# Patient Record
Sex: MALE | Race: WHITE | NOT HISPANIC OR LATINO | Employment: FULL TIME | URBAN - METROPOLITAN AREA
[De-identification: names, ages, dates, MRNs, and addresses within clinical notes are randomized per-mention and may not be internally consistent; named-entity substitution may affect disease eponyms.]

---

## 2018-01-26 ENCOUNTER — TELEPHONE (OUTPATIENT)
Dept: CARDIOLOGY | Facility: CLINIC | Age: 63
End: 2018-01-26

## 2018-01-26 NOTE — TELEPHONE ENCOUNTER
----- Message from Jillian Castillo MA sent at 1/26/2018  4:19 PM EST -----      ----- Message -----     From: Karo Alvarado     Sent: 1/26/2018   8:41 AM       To: ANA LUISA Wiseman Dr. is referring pt over to see MI. Dx: known CAD and had event of waking up with chest tightness,nausea,sweating, on 1/22/2018 and asked to be seen ASAP. Thanks

## 2018-01-26 NOTE — TELEPHONE ENCOUNTER
01/26/18  4:54 PM  I called to assess patient's symptoms. He states he has a history of CABG x 2 in 2001 with Dr. James. He followed with Dr. Duarte in [place] but has not followed with him for some time. He has a history of panic attacks. He had a panic attack this week which was associated with chest pain, sweating, and nausea. He reported this to his therapist who recommended follow-up with PCP; PCP recommended that he re-establish with a cardiologist.    He has had no chest pain not associated with anxiety. He has been able to lift weights and run on the treadmill without chest pain.    I offered him an appt with a different provider for a sooner appt, but he prefers to see Dr. Ayers. I left him a message to schedule him to see Dr. Ayers at  on 2/19 at 0920.    Simran GRIFFITH RN

## 2018-03-12 ENCOUNTER — OFFICE VISIT (OUTPATIENT)
Dept: CARDIOLOGY | Facility: CLINIC | Age: 63
End: 2018-03-12

## 2018-03-12 VITALS — WEIGHT: 235 LBS | BODY MASS INDEX: 32.9 KG/M2 | HEIGHT: 71 IN

## 2018-03-12 DIAGNOSIS — E78.2 MIXED HYPERLIPIDEMIA: ICD-10-CM

## 2018-03-12 DIAGNOSIS — I25.810 CORONARY ARTERY DISEASE INVOLVING CORONARY BYPASS GRAFT OF NATIVE HEART WITHOUT ANGINA PECTORIS: Primary | ICD-10-CM

## 2018-03-12 DIAGNOSIS — I25.2 OLD MI (MYOCARDIAL INFARCTION): ICD-10-CM

## 2018-03-12 DIAGNOSIS — I10 ESSENTIAL HYPERTENSION: ICD-10-CM

## 2018-03-12 PROCEDURE — 99204 OFFICE O/P NEW MOD 45 MIN: CPT | Performed by: INTERNAL MEDICINE

## 2018-03-12 PROCEDURE — 93000 ELECTROCARDIOGRAM COMPLETE: CPT | Performed by: INTERNAL MEDICINE

## 2018-03-12 RX ORDER — ESCITALOPRAM OXALATE 20 MG/1
20 TABLET ORAL DAILY
COMMUNITY
End: 2020-09-18

## 2018-03-12 RX ORDER — UBIDECARENONE 100 MG
100 CAPSULE ORAL DAILY
COMMUNITY

## 2018-03-12 RX ORDER — ALPRAZOLAM 0.5 MG/1
0.5 TABLET ORAL
COMMUNITY
End: 2018-09-10 | Stop reason: ALTCHOICE

## 2018-03-12 RX ORDER — AMOXICILLIN 500 MG
1200 CAPSULE ORAL DAILY
COMMUNITY

## 2018-03-12 RX ORDER — GARLIC 200 MG
1000 TABLET ORAL DAILY PRN
COMMUNITY
End: 2021-04-23

## 2018-03-12 NOTE — PROGRESS NOTES
Date of Office Visit: 18  Encounter Provider: Duncan Ayers MD  Place of Service: [facility] CARDIOLOGY  Patient Name: Toy Shannon  :1955      Chief Complaint   Patient presents with   • Coronary Artery Disease     History of Present Illness  Mr Shannon is a 61 yo gentleman with a history of hypertension, hyperlipidemia, coronary disease and previous corneal bypass grafting.  In 2011 he presented with symptoms appeared to probably had a myocardial infarction at an abnormal perfusion stress test with left ventricular ejection fraction of 35% and severe ischemia.  He then underwent cardiac catheterization that showed severe disease of the very proximal left anterior descending and severe disease the right coronary artery.  Circumflex was apparently free of disease.  He had a left ventricular ejection fraction of 30% on that.  He then underwent coronary bypass grafting where he had left internal mammary graft to the left anterior descending and vein graft to the right coronary artery.  Follow-up echocardiogram in May 2011 left ventricular ejection fraction apparently improved to 45%.  He now comes in for follow-up and to establish with new cardiologist.  He denies any complaints of chest pain or pressure.  Denies any shortness of breath, orthopnea, or PND.  Denies any palpitations, near-syncope or syncope.  Denies any prior history of rheumatic fever, heart attack, or heart failure, or heart murmur.  He has not been exercising recently started treadmill program where he would jog at 3.1 miles per hour for 12-16 minutes.          Past Medical History:   Diagnosis Date   • ADD (attention deficit disorder)    • ASCVD (arteriosclerotic cardiovascular disease)    • BMI 30.0-30.9,adult    • Chest pain    • Hypercholesteremia    • Hypogonadism in male    • Low testosterone    • Panic attack    • Snoring          History reviewed. No pertinent surgical  history.      Current Outpatient Prescriptions on File Prior to Visit   Medication Sig Dispense Refill   • albuterol (PROVENTIL HFA;VENTOLIN HFA) 108 (90 Base) MCG/ACT inhaler Inhale 2 puffs.     • atenolol (TENORMIN) 25 MG tablet Take 25 mg by mouth.     • atorvastatin (LIPITOR) 40 MG tablet Take 60 mg by mouth.     • sertraline (ZOLOFT) 100 MG tablet Take 100 mg by mouth.       No current facility-administered medications on file prior to visit.          Social History     Social History   • Marital status:      Spouse name: N/A   • Number of children: N/A   • Years of education: N/A     Occupational History   • Not on file.     Social History Main Topics   • Smoking status: Never Smoker   • Smokeless tobacco: Not on file   • Alcohol use Not on file   • Drug use: Unknown   • Sexual activity: Not on file     Other Topics Concern   • Not on file     Social History Narrative   • No narrative on file       History reviewed. No pertinent family history.      Review of Systems   Constitution: Negative for decreased appetite, diaphoresis, fever, weakness, malaise/fatigue, weight gain and weight loss.   HENT: Negative for congestion, hearing loss, nosebleeds and tinnitus.    Eyes: Negative for blurred vision, double vision, vision loss in left eye, vision loss in right eye and visual disturbance.   Cardiovascular:        As noted in HPI   Respiratory:        As noted HPI   Endocrine: Negative for cold intolerance and heat intolerance.   Hematologic/Lymphatic: Negative for bleeding problem. Does not bruise/bleed easily.   Skin: Negative for color change, flushing, itching and rash.   Musculoskeletal: Negative for arthritis, back pain, joint pain, joint swelling, muscle weakness and myalgias.   Gastrointestinal: Negative for bloating, abdominal pain, constipation, diarrhea, dysphagia, heartburn, hematemesis, hematochezia, melena, nausea and vomiting.   Genitourinary: Negative for bladder incontinence, dysuria,  "frequency, nocturia and urgency.   Neurological: Negative for dizziness, focal weakness, headaches, light-headedness, loss of balance, numbness, paresthesias and vertigo.   Psychiatric/Behavioral: Negative for depression, memory loss and substance abuse. The patient is nervous/anxious.        Procedures      ECG 12 Lead  Date/Time: 3/12/2018 12:57 PM  Performed by: SEAMUS JAMESON  Authorized by: SEAMUS JAMESON   Comparison: compared with previous ECG   Similar to previous ECG  Rhythm: sinus rhythm  Rate: normal  Conduction: right bundle branch block and LAFB  QRS axis: left                  Objective:    Ht 180.3 cm (71\")   Wt 107 kg (235 lb)   BMI 32.78 kg/m²        Physical Exam  Physical Exam   Constitutional: He is oriented to person, place, and time. He appears well-developed and well-nourished. No distress.   HENT:   Head: Normocephalic.   Eyes: Conjunctivae are normal. Pupils are equal, round, and reactive to light. No scleral icterus.   Neck: Normal carotid pulses, no hepatojugular reflux and no JVD present. Carotid bruit is not present. No tracheal deviation, no edema and no erythema present. No thyromegaly present.   Cardiovascular: Normal rate, regular rhythm, S1 normal, S2 normal, normal heart sounds and intact distal pulses.   No extrasystoles are present. PMI is not displaced.  Exam reveals no gallop, no distant heart sounds and no friction rub.    No murmur heard.  Pulses:       Carotid pulses are 2+ on the right side, and 2+ on the left side.       Radial pulses are 2+ on the right side, and 2+ on the left side.        Femoral pulses are 2+ on the right side, and 2+ on the left side.       Dorsalis pedis pulses are 2+ on the right side, and 2+ on the left side.        Posterior tibial pulses are 2+ on the right side, and 2+ on the left side.   Pulmonary/Chest: Effort normal and breath sounds normal. No respiratory distress. He has no decreased breath sounds. He has no wheezes. He has no " rhonchi. He has no rales. He exhibits no tenderness.   Abdominal: Soft. Bowel sounds are normal. He exhibits no distension and no mass. There is no hepatosplenomegaly. There is no tenderness. There is no rebound and no guarding.   Musculoskeletal: He exhibits no edema, tenderness or deformity.   Neurological: He is alert and oriented to person, place, and time.   Skin: Skin is warm and dry. No rash noted. He is not diaphoretic. No cyanosis or erythema. No pallor. Nails show no clubbing.   Psychiatric: He has a normal mood and affect. His speech is normal and behavior is normal. Judgment and thought content normal.           Assessment:   1.  62-year-old gentleman with history of coronary artery disease.  Ischemic cardiomyopathy initial left ventricular ejection fraction of 30% status post coronary bypass grafting in March 2011.  Follow-up echocardiogram May 2011 left ventricular ejection fraction of 45%.   Coronary Artery Disease  Assessment  • The patient has no angina    Plan  • Lifestyle modifications discussed include adhering to a heart healthy diet, avoidance of tobacco products, maintenance of a healthy weight, medication compliance, regular exercise and regular monitoring of cholesterol and blood pressure    Subjective - Objective  • There is a history of past MI  • There is a history of previous coronary artery bypass graft  • Current antiplatelet therapy includes aspirin 81 mg    He has not had a baseline perfusion stress test he's to return for that if unremarkable see me in 6 months and then yearly intervals after that.    2.  Hyperlipidemia on target dose atorvastatin continue the same.  3.  Hypertension blood pressure adequately controlled.  4.  Right bundle branch block and left intrafascicular block asymptomatic.    We did discuss the need for him to do moderate activity 40 minutes 4 days a week in addition to some strength training.         Plan:

## 2018-05-14 ENCOUNTER — TELEPHONE (OUTPATIENT)
Dept: CARDIOLOGY | Facility: CLINIC | Age: 63
End: 2018-05-14

## 2018-05-14 ENCOUNTER — HOSPITAL ENCOUNTER (OUTPATIENT)
Dept: CARDIOLOGY | Facility: HOSPITAL | Age: 63
Discharge: HOME | End: 2018-05-14
Attending: INTERNAL MEDICINE | Admitting: INTERNAL MEDICINE

## 2018-05-14 VITALS — BODY MASS INDEX: 32.9 KG/M2 | WEIGHT: 235 LBS | HEIGHT: 71 IN

## 2018-05-14 DIAGNOSIS — I25.810 CORONARY ARTERY DISEASE INVOLVING CORONARY BYPASS GRAFT OF NATIVE HEART WITHOUT ANGINA PECTORIS: ICD-10-CM

## 2018-05-14 DIAGNOSIS — I25.2 OLD MI (MYOCARDIAL INFARCTION): ICD-10-CM

## 2018-05-14 LAB
BH CV NUCLEAR PRIOR STUDY: 2
BH CV STRESS BP STAGE 1: NORMAL
BH CV STRESS BP STAGE 2: NORMAL
BH CV STRESS BP STAGE 3: NORMAL
BH CV STRESS DURATION MIN STAGE 1: 3
BH CV STRESS DURATION MIN STAGE 2: 3
BH CV STRESS DURATION MIN STAGE 3: 3
BH CV STRESS DURATION SEC STAGE 1: 0
BH CV STRESS DURATION SEC STAGE 2: 0
BH CV STRESS DURATION SEC STAGE 3: 0
BH CV STRESS GRADE STAGE 1: 10
BH CV STRESS GRADE STAGE 2: 12
BH CV STRESS GRADE STAGE 3: 14
BH CV STRESS HR STAGE 1: 89
BH CV STRESS HR STAGE 2: 109
BH CV STRESS HR STAGE 3: 133
BH CV STRESS METS STAGE 1: 5
BH CV STRESS METS STAGE 2: 7.5
BH CV STRESS METS STAGE 3: 10
BH CV STRESS PROTOCOL 1: NORMAL
BH CV STRESS RECOVERY BP: NORMAL MMHG
BH CV STRESS RECOVERY HR: 89 BPM
BH CV STRESS SPEED STAGE 1: 1.7
BH CV STRESS SPEED STAGE 2: 2.5
BH CV STRESS SPEED STAGE 3: 3.4
BH CV STRESS STAGE 1: 1
BH CV STRESS STAGE 2: 2
BH CV STRESS STAGE 3: 3
LV EF NUC BP: 59 %
MAXIMAL PREDICTED HEART RATE: 155 BPM
PERCENT MAX PREDICTED HR: 85.81 %
STRESS BASELINE BP: NORMAL MMHG
STRESS BASELINE HR: 65 BPM
STRESS PERCENT HR: 101 %
STRESS POST ESTIMATED WORKLOAD: 10 METS
STRESS POST EXERCISE DUR MIN: 9 MIN
STRESS POST EXERCISE DUR SEC: 0 SEC
STRESS POST PEAK BP: NORMAL MMHG
STRESS POST PEAK HR: 133 BPM
STRESS TARGET HR: 132 BPM

## 2018-05-14 PROCEDURE — 93017 CV STRESS TEST TRACING ONLY: CPT

## 2018-05-14 PROCEDURE — 93018 CV STRESS TEST I&R ONLY: CPT | Performed by: INTERNAL MEDICINE

## 2018-05-14 PROCEDURE — 78452 HT MUSCLE IMAGE SPECT MULT: CPT | Performed by: INTERNAL MEDICINE

## 2018-05-14 PROCEDURE — 0 TECHNETIUM TETROFOSMIN KIT: Performed by: INTERNAL MEDICINE

## 2018-05-14 PROCEDURE — 93016 CV STRESS TEST SUPVJ ONLY: CPT | Performed by: INTERNAL MEDICINE

## 2018-05-14 PROCEDURE — A9502 TC99M TETROFOSMIN: HCPCS | Performed by: INTERNAL MEDICINE

## 2018-05-14 PROCEDURE — 78452 HT MUSCLE IMAGE SPECT MULT: CPT

## 2018-05-14 RX ADMIN — TETROFOSMIN 1 DOSE: 1.38 INJECTION, POWDER, LYOPHILIZED, FOR SOLUTION INTRAVENOUS at 08:30

## 2018-05-14 RX ADMIN — TETROFOSMIN 1 DOSE: 1.38 INJECTION, POWDER, LYOPHILIZED, FOR SOLUTION INTRAVENOUS at 07:20

## 2018-05-14 NOTE — TELEPHONE ENCOUNTER
Toy Shannon  Male, 63 y.o., 1955  PCP:   Salima Winchester MD  Language:   English  Need Interp:   No  Last Weight:   107 kg (235 lb)  Phone:   H: [phone]  Allergies  No Known Allergies  Health Maintenance:   Due  FYI:   None  Primary Ins.:   MEDICARE  MRN:   [id number]  MyChart:   Pending  Pharmacy:   [facility] [place] [address] [place] [address] AT [facility] [address] [facility] [phone] [facility] [phone] FX [[id number]]  Preferred Lab:   None  Next Appt with Me:   09/10/2018  Next Appt Date by Dept:   06/01/2018     PACS Images     Radiology Images   Stress Test With Myocardial Perfusion One Day   Order: [id number]   Status:  Final result   Visible to patient:  No (Not Released) Dx:  Old MI (myocardial infarction); Coron...   Details     Reading Physician Reading Date Result Priority   MD Tejas Castillo MD Brennan M Haraden, MD 5/14/2018 5/14/2018 5/14/2018 Routine   Result Text   ·   Myocardial perfusion imaging indicates a normal myocardial perfusion study with no evidence of ischemia.  · Left ventricular ejection fraction is normal (Calculated EF = 59%).  · Impressions are consistent with a low risk study.  · There is no prior study available for comparison.            All Measurements                                                                                                                                                                                                                         [facility]  [address] [facility]  [address] [place] [id number] [phone]      Stress Data     Stage HR (bpm) BP (mmHg) Minutes Seconds Grade (%) Speed (MPH)   1        89        150/70        3        0        10        1.7          2        109        154/74        3        0        12        2.5          3        133        184/90        3        0        14        3.4          Stress Measurements     Baseline Vitals   Baseline HR 65  bpm      Baseline /78 mmHg       Peak Stress Vitals   Peak  bpm      Peak /90 mmHg       Recovery Vitals   Recovery HR 89 bpm      Recovery /80 mmHg       Exercise Data   Target HR (85%) 132 bpm      Max. Pred. HR (100%) 155 bpm      Percent Max Pred HR 85.81 %      Exercise duration (min) 9 min      Exercise duration (sec) 0 sec      Estimated workload 10 METS         Stress Procedure     Rest ECG Baseline ECG of normal sinus rhythm noted. Right bundle branch block and left anterior fascicular block noted.   OR interval = 160 ms. QRS complex = 120 ms. There was no ST segment deviation noted.      Stress ECG Stress ECG of normal sinus rhythm noted. Right bundle branch block and left anterior fascicular block noted.   There was no ST segment deviation noted during stress.   There were no arrhythmias during stress.   Arrhythmias during recovery: rare PVCs.   Arrhythmias were not significant.   ECG was interpretable and indicates a normal stress ECG.   Normal ECG stress ECG interpretation.      Stress Description A stress test was performed following the Gomez protocol.   The patient achieved the target heart rate. The patient requested the test to be stopped because the patient experienced fatigue.   The patient experienced no angina during the stress test.   Low risk for ischemic heart disease.   Blood pressure demonstrated a normal response to stress. Heart rate demonstrated a normal response to stress. Overall, the patient's exercise capacity was normal.      Stress Findings No ECG evidence of myocardial ischemia.Negative clinical evidence of myocardial ischemia.      Nuclear Perfusion Findings     Study Impression Myocardial perfusion imaging indicates a normal myocardial perfusion study with no evidence of ischemia. Impressions are consistent with a low risk study. There is no prior study available for comparison.      Rest Perfusion Defect 1 No rest myocardial perfusion defect noted.       Stress Perfusion Defect 1 No stress myocardial perfusion defect noted.      Ventricle Size / Description Left ventricular ejection fraction is normal (Calculated EF = 59%). Normal LV cavity size. Normal LV wall motion noted. Normal RV cavity size.      PACS Images     Show images for Stress Test With Myocardial Perfusion One Day          Last Resulted: 05/14/18 14:41                      Routing History     Priority Sent On From To Message Type    5/14/2018  2:43 PM MD Duncan Castillo MD Results

## 2018-06-01 ENCOUNTER — OFFICE VISIT (OUTPATIENT)
Dept: FAMILY MEDICINE CLINIC | Facility: CLINIC | Age: 63
End: 2018-06-01

## 2018-06-01 VITALS
OXYGEN SATURATION: 98 % | HEIGHT: 71 IN | SYSTOLIC BLOOD PRESSURE: 120 MMHG | RESPIRATION RATE: 18 BRPM | DIASTOLIC BLOOD PRESSURE: 60 MMHG | HEART RATE: 56 BPM | BODY MASS INDEX: 34.3 KG/M2 | WEIGHT: 245 LBS | TEMPERATURE: 98.1 F

## 2018-06-01 DIAGNOSIS — I10 HYPERTENSION, ESSENTIAL: Primary | ICD-10-CM

## 2018-06-01 DIAGNOSIS — Z12.11 SCREEN FOR COLON CANCER: ICD-10-CM

## 2018-06-01 DIAGNOSIS — E78.49 OTHER HYPERLIPIDEMIA: ICD-10-CM

## 2018-06-01 DIAGNOSIS — F41.9 ANXIETY DISORDER, UNSPECIFIED TYPE: ICD-10-CM

## 2018-06-01 PROCEDURE — 99203 OFFICE O/P NEW LOW 30 MIN: CPT | Performed by: INTERNAL MEDICINE

## 2018-06-01 RX ORDER — ALPRAZOLAM 1 MG/1
1 TABLET ORAL
COMMUNITY
Start: 2018-05-21 | End: 2022-11-16

## 2018-06-01 RX ORDER — ATORVASTATIN CALCIUM 80 MG/1
TABLET, FILM COATED ORAL
COMMUNITY
Start: 2018-05-15 | End: 2018-06-18 | Stop reason: SDUPTHER

## 2018-06-01 NOTE — PROGRESS NOTES
Subjective   Toy Shannon is a 63 y.o. male.   Patient formally with us now back again with history of high blood pressure lipids does have known coronary disease  History of Present Illness   Did get recent evaluation Dr. Ayers with good bill of health is on cholesterol medicine tolerating fairly well does bring several labs from February testing last is tolerating his Lipitor 80 mg daily well blood pressure satisfactory today also no exertional chest pain or other type complaints.  Does take Lexapro for anxiety disorder and when necessary Xanax no other major health changes since last seen he has not had a colonoscopy we did discuss this is we will get cologuard.    Review of Systems   All other systems reviewed and are negative.      Objective   Vitals:    06/01/18 0746   Resp: 18   Weight: 111 kg (245 lb)     Physical Exam   Constitutional: He appears well-developed and well-nourished.   HENT:   Head: Normocephalic and atraumatic.   Eyes: Conjunctivae are normal. Pupils are equal, round, and reactive to light.   Cardiovascular: Normal rate, regular rhythm and normal heart sounds.    Pulmonary/Chest: Effort normal and breath sounds normal.   Abdominal: Soft. Bowel sounds are normal.   Neurological: He is alert.   Unremarkable gait and station   Skin: Skin is warm and dry.   Psychiatric: He has a normal mood and affect. His behavior is normal.       No results found for: INR    Procedures    Assessment/Plan   .  1.  Hypertension controlled plan continue present medicine recheck 6 months    2.  Hyperlipidemia plan reviewed we will get a CMP to monitor liver enzymes lipids are under good control we will recheck that in 6 months    3.  Encounter for colon cancer screening patient is suppose to colonoscopy will proceed with [facility].  Patient is agreeable to    4.  Anxiety disorder and continue present medicines which include Lexapro and he takes when necessary Xanax for this.    Much of this encounter  note is an electronic transcription/translation of spoken language to printed text.  The electronic translation of spoken language may permit erroneous, or at times, nonsensical words or phrases to be inadvertently transcribed.  Although I have reviewed the note for such errors, some may still exist. If there are questions or for further clarification, please contact me.

## 2018-06-18 ENCOUNTER — TELEPHONE (OUTPATIENT)
Dept: FAMILY MEDICINE CLINIC | Facility: CLINIC | Age: 63
End: 2018-06-18

## 2018-06-18 RX ORDER — ATORVASTATIN CALCIUM 80 MG/1
80 TABLET, FILM COATED ORAL DAILY
Qty: 90 TABLET | Refills: 3 | Status: SHIPPED | OUTPATIENT
Start: 2018-06-18 | End: 2018-10-19 | Stop reason: SDUPTHER

## 2018-06-18 RX ORDER — ATENOLOL 25 MG/1
25 TABLET ORAL DAILY
Qty: 90 TABLET | Refills: 3 | Status: SHIPPED | OUTPATIENT
Start: 2018-06-18 | End: 2019-08-26 | Stop reason: SDUPTHER

## 2018-06-18 NOTE — TELEPHONE ENCOUNTER
REFILL ON atenolol (TENORMIN) 25 MG tablet  atorvastatin (LIPITOR) 40 MG tablet    PATIENT IS ALMOST OUT OF MEDICATION

## 2018-09-10 ENCOUNTER — OFFICE VISIT (OUTPATIENT)
Dept: CARDIOLOGY | Facility: CLINIC | Age: 63
End: 2018-09-10

## 2018-09-10 VITALS
HEIGHT: 71 IN | DIASTOLIC BLOOD PRESSURE: 84 MMHG | SYSTOLIC BLOOD PRESSURE: 130 MMHG | BODY MASS INDEX: 34.72 KG/M2 | HEART RATE: 63 BPM | WEIGHT: 248 LBS

## 2018-09-10 DIAGNOSIS — I25.2 OLD MI (MYOCARDIAL INFARCTION): ICD-10-CM

## 2018-09-10 DIAGNOSIS — IMO0001 OBESITY, CLASS II, BMI 35.0-39.9, WITH COMORBIDITY (SEE ACTUAL BMI): ICD-10-CM

## 2018-09-10 DIAGNOSIS — I25.810 CORONARY ARTERY DISEASE INVOLVING CORONARY BYPASS GRAFT OF NATIVE HEART WITHOUT ANGINA PECTORIS: Primary | ICD-10-CM

## 2018-09-10 DIAGNOSIS — I10 ESSENTIAL HYPERTENSION: ICD-10-CM

## 2018-09-10 DIAGNOSIS — E78.2 MIXED HYPERLIPIDEMIA: ICD-10-CM

## 2018-09-10 PROCEDURE — 93000 ELECTROCARDIOGRAM COMPLETE: CPT | Performed by: INTERNAL MEDICINE

## 2018-09-10 PROCEDURE — 99214 OFFICE O/P EST MOD 30 MIN: CPT | Performed by: INTERNAL MEDICINE

## 2018-09-10 RX ORDER — TRIAMCINOLONE ACETONIDE 55 UG/1
2 SPRAY, METERED NASAL AS NEEDED
COMMUNITY

## 2018-09-10 NOTE — PROGRESS NOTES
Date of Office Visit: 09/10/18  Encounter Provider: Duncan Ayers MD  Place of Service: [facility] CARDIOLOGY  Patient Name: Toy Shannon  :1955  Referral Provider:No ref. provider found      Chief Complaint   Patient presents with   • Coronary Artery Disease     History of Present Illness  Mr Shannon is a 64 yo gentleman with a history of hypertension, hyperlipidemia, coronary disease and previous corneal bypass grafting.  In 2011 he presented with symptoms appeared to probably had a myocardial infarction at an abnormal perfusion stress test with left ventricular ejection fraction of 35% and severe ischemia.  He then underwent cardiac catheterization that showed severe disease of the very proximal left anterior descending and severe disease the right coronary artery.  Circumflex was apparently free of disease.  He had a left ventricular ejection fraction of 30% on that.  He then underwent coronary bypass grafting where he had left internal mammary graft to the left anterior descending and vein graft to the right coronary artery.  Follow-up echocardiogram in May 2011 left ventricular ejection fraction apparently improved to 45%.  He now comes in for follow-up. The patient denies chest pain, pressure and heaviness. No shortness of breath, othopnea or PND. No palpitations, near syncope or syncope. No stroke type symptoms like paralysis, paresthesia, abrupt vision loss and dysarthria. No bleeding like blood in the stool or dark stools.  Unfortunately he still not really doing any structured exercise.  Overall he feels like he's doing well.  Things at home are good.      Coronary Artery Disease   Pertinent negatives include no dizziness, muscle weakness or weight gain.         Past Medical History:   Diagnosis Date   • ADD (attention deficit disorder)    • ADHD    • Anxiety    • ASCVD (arteriosclerotic cardiovascular disease)    • BMI 30.0-30.9,adult    • Chest pain    •  Hypercholesteremia    • Hypertension    • Hypogonadism in male    • Low testosterone    • Panic attack    • Snoring          Past Surgical History:   Procedure Laterality Date   • CARDIAC CATHETERIZATION     • CORONARY ARTERY BYPASS GRAFT           Current Outpatient Prescriptions on File Prior to Visit   Medication Sig Dispense Refill   • ALPRAZolam (XANAX) 1 MG tablet Take 1 mg by mouth. Takes .5 prn     • Ascorbic Acid (VITAMIN C) powder Take 3,000 mg by mouth Daily.     • aspirin 81 MG tablet Take 1 tablet by mouth Daily. 30 tablet 11   • atenolol (TENORMIN) 25 MG tablet Take 1 tablet by mouth Daily. 90 tablet 3   • atorvastatin (LIPITOR) 80 MG tablet Take 1 tablet by mouth Daily. 90 tablet 3   • coenzyme Q10 100 MG capsule Take 100 mg by mouth Daily.     • escitalopram (LEXAPRO) 20 MG tablet Take 20 mg by mouth Daily.     • Multiple Vitamins-Minerals (MULTIVITAMIN ADULTS PO) Take  by mouth Daily.     • Omega-3 Fatty Acids (FISH OIL) 1200 MG capsule capsule Take 1,200 mg by mouth Daily.     • [DISCONTINUED] albuterol (PROVENTIL HFA;VENTOLIN HFA) 108 (90 Base) MCG/ACT inhaler Inhale 2 puffs Every 6 (Six) Hours As Needed.     • [DISCONTINUED] ALPRAZolam (XANAX) 0.5 MG tablet Take 0.5 mg by mouth. 5 tablets prn       No current facility-administered medications on file prior to visit.          Social History     Social History   • Marital status:      Spouse name: N/A   • Number of children: N/A   • Years of education: N/A     Occupational History   • Not on file.     Social History Main Topics   • Smoking status: Never Smoker   • Smokeless tobacco: Never Used   • Alcohol use No   • Drug use: Unknown   • Sexual activity: Not on file     Other Topics Concern   • Not on file     Social History Narrative   • No narrative on file       Family History   Problem Relation Age of Onset   • Hypertension Mother    • Heart disease Father    • Diabetes Sister          Review of Systems   Constitution: Negative for  "decreased appetite, diaphoresis, fever, weakness, malaise/fatigue, weight gain and weight loss.   HENT: Negative for congestion, hearing loss, nosebleeds and tinnitus.    Eyes: Negative for blurred vision, double vision, vision loss in left eye, vision loss in right eye and visual disturbance.   Cardiovascular:        As noted in HPI   Respiratory:        As noted HPI   Endocrine: Negative for cold intolerance and heat intolerance.   Hematologic/Lymphatic: Negative for bleeding problem. Does not bruise/bleed easily.   Skin: Negative for color change, flushing, itching and rash.   Musculoskeletal: Negative for arthritis, back pain, joint pain, joint swelling, muscle weakness and myalgias.   Gastrointestinal: Negative for bloating, abdominal pain, constipation, diarrhea, dysphagia, heartburn, hematemesis, hematochezia, melena, nausea and vomiting.   Genitourinary: Negative for bladder incontinence, dysuria, frequency, nocturia and urgency.   Neurological: Negative for dizziness, focal weakness, headaches, light-headedness, loss of balance, numbness, paresthesias and vertigo.   Psychiatric/Behavioral: Negative for depression, memory loss and substance abuse.       Procedures      ECG 12 Lead  Date/Time: 9/10/2018 10:40 AM  Performed by: SEAMUS JAMESON  Authorized by: SEAMUS JAMESON   Comparison: compared with previous ECG   Similar to previous ECG  Rhythm: sinus rhythm  Rate: normal  Conduction: right bundle branch block and LAFB  QRS axis: left                  Objective:    /84 (BP Location: Left arm)   Pulse 63   Ht 180.3 cm (71\")   Wt 112 kg (248 lb)   BMI 34.59 kg/m²        Physical Exam  Physical Exam   Constitutional: He is oriented to person, place, and time. He appears well-developed and well-nourished. No distress.   HENT:   Head: Normocephalic.   Eyes: Pupils are equal, round, and reactive to light. Conjunctivae are normal. No scleral icterus.   Neck: Normal carotid pulses, no hepatojugular " reflux and no JVD present. Carotid bruit is not present. No tracheal deviation, no edema and no erythema present. No thyromegaly present.   Cardiovascular: Normal rate, regular rhythm, S1 normal, S2 normal, normal heart sounds and intact distal pulses.   No extrasystoles are present. PMI is not displaced.  Exam reveals no gallop, no distant heart sounds and no friction rub.    No murmur heard.  Pulses:       Carotid pulses are 2+ on the right side, and 2+ on the left side.       Radial pulses are 2+ on the right side, and 2+ on the left side.        Femoral pulses are 2+ on the right side, and 2+ on the left side.       Dorsalis pedis pulses are 2+ on the right side, and 2+ on the left side.        Posterior tibial pulses are 2+ on the right side, and 2+ on the left side.   Pulmonary/Chest: Effort normal and breath sounds normal. No respiratory distress. He has no decreased breath sounds. He has no wheezes. He has no rhonchi. He has no rales. He exhibits no tenderness.   Abdominal: Soft. Bowel sounds are normal. He exhibits no distension and no mass. There is no hepatosplenomegaly. There is no tenderness. There is no rebound and no guarding.   Musculoskeletal: He exhibits no edema, tenderness or deformity.   Neurological: He is alert and oriented to person, place, and time.   Skin: Skin is warm and dry. No rash noted. He is not diaphoretic. No cyanosis or erythema. No pallor. Nails show no clubbing.   Psychiatric: He has a normal mood and affect. His speech is normal and behavior is normal. Judgment and thought content normal.           Assessment:   1.  63-year-old gentleman with history of coronary artery disease.  Ischemic cardiomyopathy initial left ventricular ejection fraction of 30% status post coronary bypass grafting in March 2011.  Follow-up echocardiogram May 2011 left ventricular ejection fraction of 45%. Normal perfusion stress test in May 2018.  Coronary Artery Disease  Assessment  • The patient has no  angina     Plan  • Lifestyle modifications discussed include adhering to a heart healthy diet, avoidance of tobacco products, maintenance of a healthy weight, medication compliance, regular exercise and regular monitoring of cholesterol and blood pressure     Subjective - Objective  • There is a history of past MI  • There is a history of previous coronary artery bypass graft  • Current antiplatelet therapy includes aspirin 81 mg   He is to continue the same and see us in follow up in one year.     2.  Hyperlipidemia on target dose atorvastatin continue the same.  3.  Hypertension blood pressure adequately controlled.  4.  Right bundle branch block and left intrafascicular block asymptomatic.   5. Obesity. BMI is 35. He is to start diet and exercise.         Plan:

## 2018-09-25 ENCOUNTER — TELEPHONE (OUTPATIENT)
Dept: FAMILY MEDICINE CLINIC | Facility: CLINIC | Age: 63
End: 2018-09-25

## 2018-09-25 DIAGNOSIS — E78.5 HYPERLIPIDEMIA, UNSPECIFIED HYPERLIPIDEMIA TYPE: Primary | ICD-10-CM

## 2018-10-05 ENCOUNTER — LAB (OUTPATIENT)
Dept: FAMILY MEDICINE CLINIC | Facility: CLINIC | Age: 63
End: 2018-10-05

## 2018-10-05 DIAGNOSIS — E78.5 HYPERLIPIDEMIA, UNSPECIFIED HYPERLIPIDEMIA TYPE: ICD-10-CM

## 2018-10-05 LAB
ALBUMIN SERPL-MCNC: 4 G/DL (ref 3.5–5.2)
ALBUMIN/GLOB SERPL: 1.3 G/DL
ALP SERPL-CCNC: 105 U/L (ref 39–117)
ALT SERPL W P-5'-P-CCNC: 59 U/L (ref 1–41)
ANION GAP SERPL CALCULATED.3IONS-SCNC: 11.3 MMOL/L
AST SERPL-CCNC: 36 U/L (ref 1–40)
BILIRUB SERPL-MCNC: 0.4 MG/DL (ref 0.1–1.2)
BUN BLD-MCNC: 10 MG/DL (ref 8–23)
BUN/CREAT SERPL: 9.9 (ref 7–25)
CALCIUM SPEC-SCNC: 9.6 MG/DL (ref 8.6–10.5)
CHLORIDE SERPL-SCNC: 98 MMOL/L (ref 98–107)
CHOLEST SERPL-MCNC: 107 MG/DL (ref 0–200)
CO2 SERPL-SCNC: 27.7 MMOL/L (ref 22–29)
CREAT BLD-MCNC: 1.01 MG/DL (ref 0.76–1.27)
GFR SERPL CREATININE-BSD FRML MDRD: 74 ML/MIN/1.73
GLOBULIN UR ELPH-MCNC: 3.1 GM/DL
GLUCOSE BLD-MCNC: 99 MG/DL (ref 65–99)
HDLC SERPL-MCNC: 42 MG/DL (ref 40–60)
LDLC SERPL CALC-MCNC: 55 MG/DL (ref 0–100)
LDLC/HDLC SERPL: 1.32 {RATIO}
POTASSIUM BLD-SCNC: 4.4 MMOL/L (ref 3.5–5.2)
PROT SERPL-MCNC: 7.1 G/DL (ref 6–8.5)
SODIUM BLD-SCNC: 137 MMOL/L (ref 136–145)
TRIGL SERPL-MCNC: 48 MG/DL (ref 0–150)
VLDLC SERPL-MCNC: 9.6 MG/DL (ref 5–40)

## 2018-10-05 PROCEDURE — 36415 COLL VENOUS BLD VENIPUNCTURE: CPT | Performed by: INTERNAL MEDICINE

## 2018-10-05 PROCEDURE — 80061 LIPID PANEL: CPT | Performed by: INTERNAL MEDICINE

## 2018-10-05 PROCEDURE — 80053 COMPREHEN METABOLIC PANEL: CPT | Performed by: INTERNAL MEDICINE

## 2018-10-16 ENCOUNTER — TELEPHONE (OUTPATIENT)
Dept: FAMILY MEDICINE CLINIC | Facility: CLINIC | Age: 63
End: 2018-10-16

## 2018-10-16 DIAGNOSIS — R79.89 ELEVATED LFTS: Primary | ICD-10-CM

## 2018-10-16 NOTE — TELEPHONE ENCOUNTER
PT CALLED TO GET HIS LAB RESULTS. HE STATES THAT HE WANTS THE NUMBERS FROM THE LIPID PANEL MORE THAN ANYTHING, AND THAT WHO EVER CALLS CAN LEAVE A DETAILED MESSAGE WITH THE NUMBERS ON HIS VOICE MAIL

## 2018-10-16 NOTE — TELEPHONE ENCOUNTER
LDL is 55 is  below 70 w which is ideal, triglycerides are 48  which is excellent.  Did have one elevated LFT see if his prior history of that and we will get a hepatitis profile will see added overall numbers look good tentatively repeat these in 3 months time.

## 2018-10-19 ENCOUNTER — OFFICE VISIT (OUTPATIENT)
Dept: FAMILY MEDICINE CLINIC | Facility: CLINIC | Age: 63
End: 2018-10-19

## 2018-10-19 VITALS
DIASTOLIC BLOOD PRESSURE: 90 MMHG | SYSTOLIC BLOOD PRESSURE: 138 MMHG | HEIGHT: 71 IN | WEIGHT: 246.8 LBS | BODY MASS INDEX: 34.55 KG/M2 | OXYGEN SATURATION: 98 % | HEART RATE: 86 BPM | TEMPERATURE: 97.8 F

## 2018-10-19 DIAGNOSIS — R79.89 LFT ELEVATION: ICD-10-CM

## 2018-10-19 DIAGNOSIS — R79.89 ELEVATED LFTS: ICD-10-CM

## 2018-10-19 DIAGNOSIS — I10 HYPERTENSION, ESSENTIAL: ICD-10-CM

## 2018-10-19 DIAGNOSIS — E78.49 OTHER HYPERLIPIDEMIA: Primary | ICD-10-CM

## 2018-10-19 DIAGNOSIS — R06.83 SNORING: ICD-10-CM

## 2018-10-19 LAB
HAV IGM SERPL QL IA: NORMAL
HBV CORE IGM SERPL QL IA: NORMAL
HBV SURFACE AG SERPL QL IA: NORMAL
HCV AB SER DONR QL: NORMAL

## 2018-10-19 PROCEDURE — 80074 ACUTE HEPATITIS PANEL: CPT | Performed by: INTERNAL MEDICINE

## 2018-10-19 PROCEDURE — 99214 OFFICE O/P EST MOD 30 MIN: CPT | Performed by: INTERNAL MEDICINE

## 2018-10-19 PROCEDURE — 36415 COLL VENOUS BLD VENIPUNCTURE: CPT | Performed by: INTERNAL MEDICINE

## 2018-10-19 RX ORDER — ATORVASTATIN CALCIUM 40 MG/1
40 TABLET, FILM COATED ORAL DAILY
Qty: 90 TABLET | Refills: 1 | Status: SHIPPED | OUTPATIENT
Start: 2018-10-19 | End: 2018-12-06 | Stop reason: SDUPTHER

## 2018-12-05 ENCOUNTER — LAB (OUTPATIENT)
Dept: FAMILY MEDICINE CLINIC | Facility: CLINIC | Age: 63
End: 2018-12-05

## 2018-12-05 DIAGNOSIS — E78.49 OTHER HYPERLIPIDEMIA: ICD-10-CM

## 2018-12-05 LAB
ALBUMIN SERPL-MCNC: 4.2 G/DL (ref 3.5–5.2)
ALBUMIN/GLOB SERPL: 1.3 G/DL
ALP SERPL-CCNC: 84 U/L (ref 39–117)
ALT SERPL W P-5'-P-CCNC: 30 U/L (ref 1–41)
ANION GAP SERPL CALCULATED.3IONS-SCNC: 9.4 MMOL/L
AST SERPL-CCNC: 26 U/L (ref 1–40)
BILIRUB SERPL-MCNC: 0.4 MG/DL (ref 0.1–1.2)
BUN BLD-MCNC: 12 MG/DL (ref 8–23)
BUN/CREAT SERPL: 12.1 (ref 7–25)
CALCIUM SPEC-SCNC: 9.7 MG/DL (ref 8.6–10.5)
CHLORIDE SERPL-SCNC: 99 MMOL/L (ref 98–107)
CHOLEST SERPL-MCNC: 167 MG/DL (ref 0–200)
CO2 SERPL-SCNC: 29.6 MMOL/L (ref 22–29)
CREAT BLD-MCNC: 0.99 MG/DL (ref 0.76–1.27)
GFR SERPL CREATININE-BSD FRML MDRD: 76 ML/MIN/1.73
GLOBULIN UR ELPH-MCNC: 3.2 GM/DL
GLUCOSE BLD-MCNC: 108 MG/DL (ref 65–99)
HDLC SERPL-MCNC: 51 MG/DL (ref 40–60)
LDLC SERPL CALC-MCNC: 94 MG/DL (ref 0–100)
LDLC/HDLC SERPL: 1.85 {RATIO}
POTASSIUM BLD-SCNC: 4.6 MMOL/L (ref 3.5–5.2)
PROT SERPL-MCNC: 7.4 G/DL (ref 6–8.5)
SODIUM BLD-SCNC: 138 MMOL/L (ref 136–145)
TRIGL SERPL-MCNC: 109 MG/DL (ref 0–150)
VLDLC SERPL-MCNC: 21.8 MG/DL (ref 5–40)

## 2018-12-05 PROCEDURE — 80061 LIPID PANEL: CPT | Performed by: INTERNAL MEDICINE

## 2018-12-05 PROCEDURE — 80053 COMPREHEN METABOLIC PANEL: CPT | Performed by: INTERNAL MEDICINE

## 2018-12-05 PROCEDURE — 36415 COLL VENOUS BLD VENIPUNCTURE: CPT | Performed by: INTERNAL MEDICINE

## 2018-12-06 DIAGNOSIS — E78.5 HYPERLIPIDEMIA, UNSPECIFIED HYPERLIPIDEMIA TYPE: Primary | ICD-10-CM

## 2018-12-06 RX ORDER — ATORVASTATIN CALCIUM 80 MG/1
80 TABLET, FILM COATED ORAL DAILY
Qty: 90 TABLET | Refills: 0 | Status: SHIPPED | OUTPATIENT
Start: 2018-12-06 | End: 2019-08-26 | Stop reason: SDUPTHER

## 2019-03-04 ENCOUNTER — LAB (OUTPATIENT)
Dept: FAMILY MEDICINE CLINIC | Facility: CLINIC | Age: 64
End: 2019-03-04

## 2019-03-04 DIAGNOSIS — E78.5 HYPERLIPIDEMIA, UNSPECIFIED HYPERLIPIDEMIA TYPE: ICD-10-CM

## 2019-03-04 LAB
ALBUMIN SERPL-MCNC: 4.3 G/DL (ref 3.5–5.2)
ALBUMIN/GLOB SERPL: 1.4 G/DL
ALP SERPL-CCNC: 75 U/L (ref 39–117)
ALT SERPL W P-5'-P-CCNC: 31 U/L (ref 1–41)
ANION GAP SERPL CALCULATED.3IONS-SCNC: 11.8 MMOL/L
AST SERPL-CCNC: 26 U/L (ref 1–40)
BILIRUB SERPL-MCNC: 0.5 MG/DL (ref 0.1–1.2)
BUN BLD-MCNC: 13 MG/DL (ref 8–23)
BUN/CREAT SERPL: 13.3 (ref 7–25)
CALCIUM SPEC-SCNC: 9.6 MG/DL (ref 8.6–10.5)
CHLORIDE SERPL-SCNC: 101 MMOL/L (ref 98–107)
CHOLEST SERPL-MCNC: 156 MG/DL (ref 0–200)
CO2 SERPL-SCNC: 26.2 MMOL/L (ref 22–29)
CREAT BLD-MCNC: 0.98 MG/DL (ref 0.76–1.27)
GFR SERPL CREATININE-BSD FRML MDRD: 77 ML/MIN/1.73
GLOBULIN UR ELPH-MCNC: 3.1 GM/DL
GLUCOSE BLD-MCNC: 102 MG/DL (ref 65–99)
HDLC SERPL-MCNC: 48 MG/DL (ref 40–60)
LDLC SERPL CALC-MCNC: 89 MG/DL (ref 0–100)
LDLC/HDLC SERPL: 1.85 {RATIO}
POTASSIUM BLD-SCNC: 4.6 MMOL/L (ref 3.5–5.2)
PROT SERPL-MCNC: 7.4 G/DL (ref 6–8.5)
SODIUM BLD-SCNC: 139 MMOL/L (ref 136–145)
TRIGL SERPL-MCNC: 96 MG/DL (ref 0–150)
VLDLC SERPL-MCNC: 19.2 MG/DL (ref 5–40)

## 2019-03-04 PROCEDURE — 36415 COLL VENOUS BLD VENIPUNCTURE: CPT | Performed by: INTERNAL MEDICINE

## 2019-03-04 PROCEDURE — 80061 LIPID PANEL: CPT | Performed by: INTERNAL MEDICINE

## 2019-03-04 PROCEDURE — 80053 COMPREHEN METABOLIC PANEL: CPT | Performed by: INTERNAL MEDICINE

## 2019-03-06 DIAGNOSIS — E78.5 HYPERLIPIDEMIA, UNSPECIFIED HYPERLIPIDEMIA TYPE: Primary | ICD-10-CM

## 2019-03-06 RX ORDER — EZETIMIBE 10 MG/1
10 TABLET ORAL DAILY
Qty: 30 TABLET | Refills: 1 | Status: SHIPPED | OUTPATIENT
Start: 2019-03-06 | End: 2019-06-05 | Stop reason: SDUPTHER

## 2019-04-29 ENCOUNTER — LAB (OUTPATIENT)
Dept: FAMILY MEDICINE CLINIC | Facility: CLINIC | Age: 64
End: 2019-04-29

## 2019-04-29 DIAGNOSIS — E78.5 HYPERLIPIDEMIA, UNSPECIFIED HYPERLIPIDEMIA TYPE: ICD-10-CM

## 2019-04-29 LAB
ALBUMIN SERPL-MCNC: 4 G/DL (ref 3.5–5.2)
ALBUMIN/GLOB SERPL: 1.3 G/DL
ALP SERPL-CCNC: 84 U/L (ref 39–117)
ALT SERPL W P-5'-P-CCNC: 30 U/L (ref 1–41)
ANION GAP SERPL CALCULATED.3IONS-SCNC: 10.6 MMOL/L
AST SERPL-CCNC: 29 U/L (ref 1–40)
BILIRUB SERPL-MCNC: 0.3 MG/DL (ref 0.2–1.2)
BUN BLD-MCNC: 11 MG/DL (ref 8–23)
BUN/CREAT SERPL: 12.2 (ref 7–25)
CALCIUM SPEC-SCNC: 9.5 MG/DL (ref 8.6–10.5)
CHLORIDE SERPL-SCNC: 102 MMOL/L (ref 98–107)
CHOLEST SERPL-MCNC: 141 MG/DL (ref 0–200)
CO2 SERPL-SCNC: 26.4 MMOL/L (ref 22–29)
CREAT BLD-MCNC: 0.9 MG/DL (ref 0.76–1.27)
GFR SERPL CREATININE-BSD FRML MDRD: 84 ML/MIN/1.73
GLOBULIN UR ELPH-MCNC: 3.2 GM/DL
GLUCOSE BLD-MCNC: 103 MG/DL (ref 65–99)
HDLC SERPL-MCNC: 52 MG/DL (ref 40–60)
LDLC SERPL CALC-MCNC: 76 MG/DL (ref 0–100)
LDLC/HDLC SERPL: 1.46 {RATIO}
POTASSIUM BLD-SCNC: 5 MMOL/L (ref 3.5–5.2)
PROT SERPL-MCNC: 7.2 G/DL (ref 6–8.5)
SODIUM BLD-SCNC: 139 MMOL/L (ref 136–145)
TRIGL SERPL-MCNC: 65 MG/DL (ref 0–150)
VLDLC SERPL-MCNC: 13 MG/DL (ref 5–40)

## 2019-04-29 PROCEDURE — 80053 COMPREHEN METABOLIC PANEL: CPT | Performed by: INTERNAL MEDICINE

## 2019-04-29 PROCEDURE — 36415 COLL VENOUS BLD VENIPUNCTURE: CPT | Performed by: INTERNAL MEDICINE

## 2019-04-29 PROCEDURE — 80061 LIPID PANEL: CPT | Performed by: INTERNAL MEDICINE

## 2019-06-05 RX ORDER — EZETIMIBE 10 MG/1
TABLET ORAL
Qty: 30 TABLET | Refills: 0 | Status: SHIPPED | OUTPATIENT
Start: 2019-06-05 | End: 2019-09-25

## 2019-06-25 ENCOUNTER — OFFICE VISIT (OUTPATIENT)
Dept: FAMILY MEDICINE CLINIC | Facility: CLINIC | Age: 64
End: 2019-06-25

## 2019-06-25 VITALS
HEIGHT: 71 IN | TEMPERATURE: 98 F | SYSTOLIC BLOOD PRESSURE: 128 MMHG | DIASTOLIC BLOOD PRESSURE: 82 MMHG | OXYGEN SATURATION: 96 % | WEIGHT: 257.6 LBS | BODY MASS INDEX: 36.06 KG/M2 | HEART RATE: 62 BPM

## 2019-06-25 DIAGNOSIS — M25.512 ACUTE PAIN OF LEFT SHOULDER: Primary | ICD-10-CM

## 2019-06-25 PROCEDURE — 73030 X-RAY EXAM OF SHOULDER: CPT | Performed by: INTERNAL MEDICINE

## 2019-06-25 PROCEDURE — 99213 OFFICE O/P EST LOW 20 MIN: CPT | Performed by: INTERNAL MEDICINE

## 2019-08-26 ENCOUNTER — TELEPHONE (OUTPATIENT)
Dept: FAMILY MEDICINE CLINIC | Facility: CLINIC | Age: 64
End: 2019-08-26

## 2019-08-26 RX ORDER — ATORVASTATIN CALCIUM 80 MG/1
80 TABLET, FILM COATED ORAL DAILY
Qty: 90 TABLET | Refills: 0 | Status: SHIPPED | OUTPATIENT
Start: 2019-08-26 | End: 2019-09-25 | Stop reason: SDUPTHER

## 2019-08-26 RX ORDER — ATENOLOL 25 MG/1
TABLET ORAL
Qty: 90 TABLET | Refills: 2 | Status: SHIPPED | OUTPATIENT
Start: 2019-08-26 | End: 2019-09-25 | Stop reason: SDUPTHER

## 2019-09-25 ENCOUNTER — OFFICE VISIT (OUTPATIENT)
Dept: FAMILY MEDICINE CLINIC | Facility: CLINIC | Age: 64
End: 2019-09-25

## 2019-09-25 VITALS
OXYGEN SATURATION: 97 % | BODY MASS INDEX: 36.26 KG/M2 | RESPIRATION RATE: 18 BRPM | TEMPERATURE: 98 F | HEIGHT: 71 IN | DIASTOLIC BLOOD PRESSURE: 69 MMHG | WEIGHT: 259 LBS | SYSTOLIC BLOOD PRESSURE: 114 MMHG | HEART RATE: 66 BPM

## 2019-09-25 DIAGNOSIS — E78.5 HYPERLIPIDEMIA, UNSPECIFIED HYPERLIPIDEMIA TYPE: ICD-10-CM

## 2019-09-25 DIAGNOSIS — I10 ESSENTIAL HYPERTENSION: Primary | ICD-10-CM

## 2019-09-25 DIAGNOSIS — Z12.5 SCREENING FOR PROSTATE CANCER: ICD-10-CM

## 2019-09-25 DIAGNOSIS — N52.9 ERECTILE DYSFUNCTION, UNSPECIFIED ERECTILE DYSFUNCTION TYPE: ICD-10-CM

## 2019-09-25 PROBLEM — F90.9 ADHD: Status: ACTIVE | Noted: 2017-03-31

## 2019-09-25 PROBLEM — F41.1 GENERALIZED ANXIETY DISORDER: Status: ACTIVE | Noted: 2017-03-31

## 2019-09-25 PROCEDURE — 99203 OFFICE O/P NEW LOW 30 MIN: CPT | Performed by: FAMILY MEDICINE

## 2019-09-25 RX ORDER — TRAZODONE HYDROCHLORIDE 100 MG/1
100 TABLET ORAL
COMMUNITY
Start: 2019-09-21

## 2019-09-25 RX ORDER — ATENOLOL 25 MG/1
25 TABLET ORAL DAILY
Qty: 90 TABLET | Refills: 1 | Status: SHIPPED | OUTPATIENT
Start: 2019-09-25 | End: 2020-08-25

## 2019-09-25 RX ORDER — FEXOFENADINE HCL 180 MG/1
180 TABLET ORAL DAILY
COMMUNITY
End: 2021-04-23

## 2019-09-25 RX ORDER — ATORVASTATIN CALCIUM 80 MG/1
80 TABLET, FILM COATED ORAL DAILY
Qty: 90 TABLET | Refills: 1 | Status: SHIPPED | OUTPATIENT
Start: 2019-09-25 | End: 2020-03-12

## 2019-09-25 RX ORDER — BUPROPION HYDROCHLORIDE 150 MG/1
150 TABLET ORAL DAILY
COMMUNITY
Start: 2019-09-21 | End: 2020-09-18

## 2019-09-25 RX ORDER — CHROMIUM 200 MCG
200 TABLET ORAL DAILY
COMMUNITY
End: 2020-09-18

## 2019-10-02 ENCOUNTER — CLINICAL SUPPORT (OUTPATIENT)
Dept: FAMILY MEDICINE CLINIC | Facility: CLINIC | Age: 64
End: 2019-10-02

## 2019-10-02 DIAGNOSIS — Z12.5 SCREENING FOR PROSTATE CANCER: ICD-10-CM

## 2019-10-02 DIAGNOSIS — N52.9 ERECTILE DYSFUNCTION, UNSPECIFIED ERECTILE DYSFUNCTION TYPE: ICD-10-CM

## 2019-10-02 LAB — PSA SERPL-MCNC: 1.07 NG/ML (ref 0–4)

## 2019-10-02 PROCEDURE — 84403 ASSAY OF TOTAL TESTOSTERONE: CPT | Performed by: FAMILY MEDICINE

## 2019-10-02 PROCEDURE — G0103 PSA SCREENING: HCPCS | Performed by: FAMILY MEDICINE

## 2019-10-02 PROCEDURE — 36415 COLL VENOUS BLD VENIPUNCTURE: CPT | Performed by: FAMILY MEDICINE

## 2019-10-02 PROCEDURE — 84402 ASSAY OF FREE TESTOSTERONE: CPT | Performed by: FAMILY MEDICINE

## 2019-10-06 LAB
TESTOST FREE SERPL-MCNC: 5.7 PG/ML (ref 6.6–18.1)
TESTOST SERPL-MCNC: 474.5 NG/DL (ref 264–916)

## 2019-10-08 DIAGNOSIS — E29.1 HYPOGONADISM IN MALE: Primary | ICD-10-CM

## 2019-10-08 RX ORDER — TESTOSTERONE 20.25 MG/1.25G
GEL TOPICAL
Qty: 75 G | Refills: 2 | Status: SHIPPED | OUTPATIENT
Start: 2019-10-08 | End: 2020-01-22 | Stop reason: SDUPTHER

## 2019-10-28 ENCOUNTER — OFFICE VISIT (OUTPATIENT)
Dept: CARDIOLOGY | Facility: CLINIC | Age: 64
End: 2019-10-28

## 2019-10-28 VITALS
SYSTOLIC BLOOD PRESSURE: 140 MMHG | WEIGHT: 259.6 LBS | HEART RATE: 79 BPM | BODY MASS INDEX: 36.34 KG/M2 | HEIGHT: 71 IN | DIASTOLIC BLOOD PRESSURE: 80 MMHG

## 2019-10-28 DIAGNOSIS — E78.2 MIXED HYPERLIPIDEMIA: ICD-10-CM

## 2019-10-28 DIAGNOSIS — I25.810 CORONARY ARTERY DISEASE INVOLVING CORONARY BYPASS GRAFT OF NATIVE HEART WITHOUT ANGINA PECTORIS: Primary | ICD-10-CM

## 2019-10-28 DIAGNOSIS — I10 ESSENTIAL HYPERTENSION: ICD-10-CM

## 2019-10-28 DIAGNOSIS — I25.2 OLD MI (MYOCARDIAL INFARCTION): ICD-10-CM

## 2019-10-28 PROCEDURE — 99214 OFFICE O/P EST MOD 30 MIN: CPT | Performed by: INTERNAL MEDICINE

## 2019-10-28 PROCEDURE — 93000 ELECTROCARDIOGRAM COMPLETE: CPT | Performed by: INTERNAL MEDICINE

## 2019-10-28 RX ORDER — ASPIRIN 81 MG/1
81 TABLET, CHEWABLE ORAL DAILY
COMMUNITY

## 2019-10-28 NOTE — PROGRESS NOTES
Date of Office Visit: 10/28/19  Encounter Provider: Duncan Ayers MD  Place of Service: [facility] CARDIOLOGY  Patient Name: Toy Shannon  :1955  Referral Provider:No ref. provider found      Chief Complaint   Patient presents with   • Follow-up     History of Present Illness  Mr Shannon is a 65 yo gentleman with a history of hypertension, hyperlipidemia, coronary disease and previous corneal bypass grafting.  In 2011 he presented with symptoms appeared to probably had a myocardial infarction at an abnormal perfusion stress test with left ventricular ejection fraction of 35% and severe ischemia.  He then underwent cardiac catheterization that showed severe disease of the very proximal left anterior descending and severe disease the right coronary artery.  Circumflex was apparently free of disease.  He had a left ventricular ejection fraction of 30% on that.  He then underwent coronary bypass grafting where he had left internal mammary graft to the left anterior descending and vein graft to the right coronary artery.  Follow-up echocardiogram in May 2011 left ventricular ejection fraction apparently improved to 45%.  He now comes in for follow-up. The patient denies chest pain, pressure and heaviness. No shortness of breath, othopnea or PND. No palpitations, near syncope or syncope. No stroke type symptoms like paralysis, paresthesia, abrupt vision loss and dysarthria. No bleeding like blood in the stool or dark stools.  He did get worse before he got better he got all the way up to 265 pounds but since [facility] 5 days a week for 30 minutes he lost 10 pounds.  He is also tried treating his sleep apnea with CPAP and seems to be doing much better.      Coronary Artery Disease   Pertinent negatives include no dizziness, muscle weakness or weight gain.         Past Medical History:   Diagnosis Date   • Anxiety    • ASCVD  (arteriosclerotic cardiovascular disease)    • BMI 30.0-30.9,adult    • Hypercholesteremia    • Hypertension    • Panic attack    • Sleep apnea     CPAP---Dr. Garcia   • VACCINE     Hep B x3         Past Surgical History:   Procedure Laterality Date   • CARDIAC CATHETERIZATION      2011   • COLONOSCOPY      COLOGUARD---   • CORONARY ARTERY BYPASS GRAFT  2011    X 2    Dr. Ayers   • VASECTOMY           Current Outpatient Medications on File Prior to Visit   Medication Sig Dispense Refill   • Acetylcysteine 500 MG effervescent tablet Take 500 mg by mouth Daily.     • ALPRAZolam (XANAX) 1 MG tablet Take 1 mg by mouth. Takes .5 tab 5x daily prn     • Ascorbic Acid (VITAMIN C) powder Take 3,000 mg by mouth Daily.     • aspirin 81 MG chewable tablet Chew 81 mg Daily.     • atenolol (TENORMIN) 25 MG tablet Take 1 tablet by mouth Daily. 90 tablet 1   • atorvastatin (LIPITOR) 80 MG tablet Take 1 tablet by mouth Daily. New dose for cholesterol 90 tablet 1   • buPROPion XL (WELLBUTRIN XL) 150 MG 24 hr tablet Take 150 mg by mouth Daily.     • Chromium (CHROMIUM GTF) 200 MCG tablet Take 200 mcg by mouth Daily.     • coenzyme Q10 100 MG capsule Take 100 mg by mouth Daily.     • escitalopram (LEXAPRO) 20 MG tablet Take 20 mg by mouth Daily.     • fexofenadine (ALLEGRA) 180 MG tablet Take 180 mg by mouth Daily.     • Multiple Vitamins-Minerals (MULTIVITAMIN ADULTS PO) Take  by mouth Daily.     • Omega-3 Fatty Acids (FISH OIL) 1200 MG capsule capsule Take 1,200 mg by mouth Daily.     • Testosterone (ANDROGEL) 20.25 MG/1.25GM (1.62%) gel 1 pump to each shoulder QAM 75 g 2   • traZODone (DESYREL) 50 MG tablet Take 50 mg by mouth every night at bedtime.     • Triamcinolone Acetonide (NASACORT) 55 MCG/ACT nasal inhaler 2 sprays into the nostril(s) as directed by provider As Needed for Rhinitis.     • Turmeric Curcumin 500 MG capsule Take  by mouth.     • [DISCONTINUED] NATTOKINASE PO Take 2,000 FVIII Units/kg by mouth.     •  [DISCONTINUED] Psyllium 100 % powder Take 100 mg by mouth 3 (Three) Times a Day As Needed.     • [DISCONTINUED] Vitamin D-Vitamin K (VITAMIN K2-VITAMIN D3 PO) Take 4 drops by mouth Daily. Vit D 1000 + vit K 200       No current facility-administered medications on file prior to visit.          Social History     Socioeconomic History   • Marital status:      Spouse name: Not on file   • Number of children: Not on file   • Years of education: Not on file   • Highest education level: Not on file   Tobacco Use   • Smoking status: Never Smoker   • Smokeless tobacco: Never Used   Substance and Sexual Activity   • Alcohol use: Yes     Comment: Hx ---QUIT @ 40 y/o   • Drug use: No   Lifestyle   • Physical activity:     Days per week: 5 days     Minutes per session: 30 min   • Stress: Not on file       Family History   Problem Relation Age of Onset   • Hypertension Mother    • Parkinsonism Mother    • Anxiety disorder Mother    • Dementia Mother    • Heart disease Father    • Heart attack Father    • Diabetes Sister    • Liver disease Sister         fatty liver   • Parkinsonism Sister          Review of Systems   Constitution: Negative for decreased appetite, diaphoresis, fever, weakness, malaise/fatigue, weight gain and weight loss.   HENT: Negative for congestion, hearing loss, nosebleeds and tinnitus.    Eyes: Negative for blurred vision, double vision, vision loss in left eye, vision loss in right eye and visual disturbance.   Cardiovascular:        As noted in HPI   Respiratory:        As noted HPI   Endocrine: Negative for cold intolerance and heat intolerance.   Hematologic/Lymphatic: Negative for bleeding problem. Does not bruise/bleed easily.   Skin: Negative for color change, flushing, itching and rash.   Musculoskeletal: Negative for arthritis, back pain, joint pain, joint swelling, muscle weakness and myalgias.   Gastrointestinal: Negative for bloating, abdominal pain, constipation, diarrhea, dysphagia,  "heartburn, hematemesis, hematochezia, melena, nausea and vomiting.   Genitourinary: Negative for bladder incontinence, dysuria, frequency, nocturia and urgency.   Neurological: Negative for dizziness, focal weakness, headaches, light-headedness, loss of balance, numbness, paresthesias and vertigo.   Psychiatric/Behavioral: Negative for depression, memory loss and substance abuse. The patient is nervous/anxious.        Procedures      ECG 12 Lead  Date/Time: 10/28/2019 3:07 PM  Performed by: Duncan Ayers MD  Authorized by: Duncan Ayers MD   Comparison: compared with previous ECG   Similar to previous ECG  Rhythm: sinus rhythm  Rate: normal  Conduction: right bundle branch block and left anterior fascicular block  QRS axis: left                  Objective:    /80 (BP Location: Right arm)   Pulse 79   Ht 180.3 cm (71\")   Wt 118 kg (259 lb 9.6 oz)   BMI 36.21 kg/m²        Physical Exam  Physical Exam   Constitutional: He is oriented to person, place, and time. He appears well-developed and well-nourished. No distress.   HENT:   Head: Normocephalic.   Eyes: Conjunctivae are normal. Pupils are equal, round, and reactive to light. No scleral icterus.   Neck: Normal carotid pulses, no hepatojugular reflux and no JVD present. Carotid bruit is not present. No tracheal deviation, no edema and no erythema present. No thyromegaly present.   Cardiovascular: Normal rate, regular rhythm, S1 normal, S2 normal, normal heart sounds and intact distal pulses.  No extrasystoles are present. PMI is not displaced. Exam reveals no gallop, no distant heart sounds and no friction rub.   No murmur heard.  Pulses:       Carotid pulses are 2+ on the right side, and 2+ on the left side.       Radial pulses are 2+ on the right side, and 2+ on the left side.        Femoral pulses are 2+ on the right side, and 2+ on the left side.       Dorsalis pedis pulses are 2+ on the right side, and 2+ on the left side.        Posterior " tibial pulses are 2+ on the right side, and 2+ on the left side.   Pulmonary/Chest: Effort normal and breath sounds normal. No respiratory distress. He has no decreased breath sounds. He has no wheezes. He has no rhonchi. He has no rales. He exhibits no tenderness.   Abdominal: Soft. Bowel sounds are normal. He exhibits no distension and no mass. There is no hepatosplenomegaly. There is no tenderness. There is no rebound and no guarding.   Musculoskeletal: He exhibits no edema, tenderness or deformity.   Neurological: He is alert and oriented to person, place, and time.   Skin: Skin is warm and dry. No rash noted. He is not diaphoretic. No cyanosis or erythema. No pallor. Nails show no clubbing.   Psychiatric: He has a normal mood and affect. His speech is normal and behavior is normal. Judgment and thought content normal.           Assessment:   1. 64-year-old gentleman with history of coronary artery disease.  Ischemic cardiomyopathy initial left ventricular ejection fraction of 30% status post coronary bypass grafting in March 2011.  Follow-up echocardiogram May 2011 left ventricular ejection fraction of 45%. Normal perfusion stress test in May 2018.  Left ventricular ejection fraction on that test was 59%.  Coronary Artery Disease  Assessment  • The patient has no angina     Plan  • Lifestyle modifications discussed include adhering to a heart healthy diet, avoidance of tobacco products, maintenance of a healthy weight, medication compliance, regular exercise and regular monitoring of cholesterol and blood pressure     Subjective - Objective  • There is a history of past MI  • There is a history of previous coronary artery bypass graft  • Current antiplatelet therapy includes aspirin 81 mg   He is to continue the same and see us in follow up in one year.  If he needs more for his blood pressure would consider using ACE inhibitor.     2.  Hyperlipidemia on target dose atorvastatin continue the same.  3.   Hypertension blood pressure adequately controlled.  As above.  4.  Right bundle branch block and left intrafascicular block asymptomatic.   5. Obesity. BMI is 36.  He is finally dieting exercising and losing weight and should stay on that path.         Plan:

## 2020-01-09 RX ORDER — TESTOSTERONE 16.2 MG/G
GEL TRANSDERMAL
Qty: 75 G | Refills: 2 | OUTPATIENT
Start: 2020-01-09

## 2020-01-15 ENCOUNTER — CLINICAL SUPPORT (OUTPATIENT)
Dept: FAMILY MEDICINE CLINIC | Facility: CLINIC | Age: 65
End: 2020-01-15

## 2020-01-15 DIAGNOSIS — E29.1 HYPOGONADISM IN MALE: ICD-10-CM

## 2020-01-15 PROCEDURE — 36415 COLL VENOUS BLD VENIPUNCTURE: CPT | Performed by: FAMILY MEDICINE

## 2020-01-15 PROCEDURE — 84402 ASSAY OF FREE TESTOSTERONE: CPT | Performed by: FAMILY MEDICINE

## 2020-01-15 PROCEDURE — 84403 ASSAY OF TOTAL TESTOSTERONE: CPT | Performed by: FAMILY MEDICINE

## 2020-01-21 LAB
TESTOST FREE SERPL-MCNC: 4.8 PG/ML (ref 6.6–18.1)
TESTOST SERPL-MCNC: 355 NG/DL (ref 264–916)

## 2020-01-22 ENCOUNTER — TELEPHONE (OUTPATIENT)
Dept: FAMILY MEDICINE CLINIC | Facility: CLINIC | Age: 65
End: 2020-01-22

## 2020-01-22 DIAGNOSIS — E29.1 HYPOGONADISM IN MALE: Primary | ICD-10-CM

## 2020-01-22 RX ORDER — TESTOSTERONE 20.25 MG/1.25G
GEL TOPICAL
Qty: 1.25 G | Refills: 2 | Status: SHIPPED | OUTPATIENT
Start: 2020-01-22 | End: 2020-04-10

## 2020-01-22 NOTE — TELEPHONE ENCOUNTER
Last visit: 10/2/19  Next visit: 3/25/20  Last labs: 10/2/19    Rx requested:Testosterone   Pharmacy: [facility] in [place]     Patient requested this refill.

## 2020-03-06 ENCOUNTER — TELEPHONE (OUTPATIENT)
Dept: FAMILY MEDICINE CLINIC | Facility: CLINIC | Age: 65
End: 2020-03-06

## 2020-03-06 DIAGNOSIS — E29.1 HYPOGONADISM IN MALE: Primary | ICD-10-CM

## 2020-03-06 NOTE — TELEPHONE ENCOUNTER
Patient called and left a VM stating that he is coming in for blood work on 3/18/20 for his lipid panel,testosterone and would like to know what his inflammatory marker is and wants to know if you could add a C-reactive protein level. (also the testosterone level you wanted him to have check in March is not in the system if you could add that as well)

## 2020-03-07 DIAGNOSIS — E78.5 HYPERLIPIDEMIA, UNSPECIFIED HYPERLIPIDEMIA TYPE: Primary | ICD-10-CM

## 2020-03-07 DIAGNOSIS — I10 ESSENTIAL HYPERTENSION: ICD-10-CM

## 2020-03-12 RX ORDER — ATORVASTATIN CALCIUM 80 MG/1
TABLET, FILM COATED ORAL
Qty: 90 TABLET | Refills: 0 | Status: SHIPPED | OUTPATIENT
Start: 2020-03-12 | End: 2020-10-12 | Stop reason: SDUPTHER

## 2020-03-12 NOTE — TELEPHONE ENCOUNTER
Last visit:  1/15/20  Next visit: 3/25/20  Last labs:1/15/20    Rx requested: atorvastatin 80 MG tablet  Pharmacy: [facility] in [place]

## 2020-03-18 ENCOUNTER — CLINICAL SUPPORT (OUTPATIENT)
Dept: FAMILY MEDICINE CLINIC | Facility: CLINIC | Age: 65
End: 2020-03-18

## 2020-03-18 DIAGNOSIS — I10 ESSENTIAL HYPERTENSION: ICD-10-CM

## 2020-03-18 DIAGNOSIS — E29.1 HYPOGONADISM IN MALE: ICD-10-CM

## 2020-03-18 DIAGNOSIS — E78.5 HYPERLIPIDEMIA, UNSPECIFIED HYPERLIPIDEMIA TYPE: ICD-10-CM

## 2020-03-18 PROCEDURE — 84402 ASSAY OF FREE TESTOSTERONE: CPT | Performed by: FAMILY MEDICINE

## 2020-03-18 PROCEDURE — 80053 COMPREHEN METABOLIC PANEL: CPT | Performed by: FAMILY MEDICINE

## 2020-03-18 PROCEDURE — 86141 C-REACTIVE PROTEIN HS: CPT | Performed by: FAMILY MEDICINE

## 2020-03-18 PROCEDURE — 84403 ASSAY OF TOTAL TESTOSTERONE: CPT | Performed by: FAMILY MEDICINE

## 2020-03-18 PROCEDURE — 80061 LIPID PANEL: CPT | Performed by: FAMILY MEDICINE

## 2020-03-18 PROCEDURE — 36415 COLL VENOUS BLD VENIPUNCTURE: CPT | Performed by: FAMILY MEDICINE

## 2020-03-19 LAB
ALBUMIN SERPL-MCNC: 4.2 G/DL (ref 3.5–5.2)
ALBUMIN/GLOB SERPL: 1.5 G/DL
ALP SERPL-CCNC: 79 U/L (ref 39–117)
ALT SERPL W P-5'-P-CCNC: 26 U/L (ref 1–41)
ANION GAP SERPL CALCULATED.3IONS-SCNC: 8.8 MMOL/L (ref 5–15)
AST SERPL-CCNC: 25 U/L (ref 1–40)
BILIRUB SERPL-MCNC: 0.4 MG/DL (ref 0.2–1.2)
BUN BLD-MCNC: 9 MG/DL (ref 8–23)
BUN/CREAT SERPL: 10.1 (ref 7–25)
CALCIUM SPEC-SCNC: 9.4 MG/DL (ref 8.6–10.5)
CHLORIDE SERPL-SCNC: 100 MMOL/L (ref 98–107)
CHOLEST SERPL-MCNC: 158 MG/DL (ref 0–200)
CO2 SERPL-SCNC: 28.2 MMOL/L (ref 22–29)
CREAT BLD-MCNC: 0.89 MG/DL (ref 0.76–1.27)
CRP SERPL-MCNC: 0.11 MG/DL (ref 0.01–0.5)
GFR SERPL CREATININE-BSD FRML MDRD: 85 ML/MIN/1.73
GLOBULIN UR ELPH-MCNC: 2.8 GM/DL
GLUCOSE BLD-MCNC: 92 MG/DL (ref 65–99)
HDLC SERPL-MCNC: 43 MG/DL (ref 40–60)
LDLC SERPL CALC-MCNC: 93 MG/DL (ref 0–100)
LDLC/HDLC SERPL: 2.15 {RATIO}
POTASSIUM BLD-SCNC: 4.8 MMOL/L (ref 3.5–5.2)
PROT SERPL-MCNC: 7 G/DL (ref 6–8.5)
SODIUM BLD-SCNC: 137 MMOL/L (ref 136–145)
TRIGL SERPL-MCNC: 112 MG/DL (ref 0–150)
VLDLC SERPL-MCNC: 22.4 MG/DL (ref 5–40)

## 2020-03-20 ENCOUNTER — TELEPHONE (OUTPATIENT)
Dept: FAMILY MEDICINE CLINIC | Facility: CLINIC | Age: 65
End: 2020-03-20

## 2020-03-20 NOTE — TELEPHONE ENCOUNTER
Received fax from [facility] for an alternative on the Testosterone 1.62% Gel.  Drug is non-formulary.

## 2020-03-21 LAB
TESTOST FREE SERPL-MCNC: 12 PG/ML (ref 6.6–18.1)
TESTOST SERPL-MCNC: 855.5 NG/DL (ref 264–916)

## 2020-04-10 DIAGNOSIS — E29.1 HYPOGONADISM IN MALE: ICD-10-CM

## 2020-04-10 RX ORDER — TESTOSTERONE 16.2 MG/G
GEL TRANSDERMAL
Qty: 75 G | Refills: 2 | Status: SHIPPED | OUTPATIENT
Start: 2020-04-10 | End: 2020-06-23

## 2020-06-23 DIAGNOSIS — E29.1 HYPOGONADISM IN MALE: ICD-10-CM

## 2020-06-23 RX ORDER — TESTOSTERONE 16.2 MG/G
GEL TRANSDERMAL
Qty: 75 G | Refills: 0 | Status: SHIPPED | OUTPATIENT
Start: 2020-06-23 | End: 2020-07-27

## 2020-07-25 DIAGNOSIS — E29.1 HYPOGONADISM IN MALE: ICD-10-CM

## 2020-07-27 RX ORDER — TESTOSTERONE 16.2 MG/G
GEL TRANSDERMAL
Qty: 75 G | Refills: 0 | Status: SHIPPED | OUTPATIENT
Start: 2020-07-27 | End: 2020-08-26

## 2020-07-27 NOTE — TELEPHONE ENCOUNTER
Last visit:3/25/20  Next visit:8/10/20  Last labs: 3/18/20    Rx requested: Testosterone   Pharmacy: [facility] [place]

## 2020-08-21 ENCOUNTER — TELEPHONE (OUTPATIENT)
Dept: FAMILY MEDICINE CLINIC | Facility: CLINIC | Age: 65
End: 2020-08-21

## 2020-08-21 NOTE — TELEPHONE ENCOUNTER
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       Patient then called back stating that he will just wait to see what his wifes results are and if she is positive he will go get tested.

## 2020-08-25 DIAGNOSIS — E29.1 HYPOGONADISM IN MALE: ICD-10-CM

## 2020-08-25 RX ORDER — ATENOLOL 25 MG/1
TABLET ORAL
Qty: 90 TABLET | Refills: 1 | Status: SHIPPED | OUTPATIENT
Start: 2020-08-25 | End: 2021-04-09 | Stop reason: SDUPTHER

## 2020-08-25 NOTE — TELEPHONE ENCOUNTER
If pt is seeing Dr Adrian, He cant also see me------we are both primary care    Please clarify this

## 2020-08-26 RX ORDER — TESTOSTERONE 16.2 MG/G
GEL TRANSDERMAL
Qty: 75 G | Refills: 2 | Status: SHIPPED | OUTPATIENT
Start: 2020-08-26 | End: 2020-10-30

## 2020-08-26 NOTE — TELEPHONE ENCOUNTER
Pt does not see [facility] anymore. This was his prev Dr, and pharm had filled a med on one of his old rx bc he still had a refill left at [facility].    [facility], due to cost.

## 2020-09-11 NOTE — PROGRESS NOTES
Subjective   Toy Shannon is a 63 y.o. male.   Patient here follow-up on blood pressure and lipids.  Also notes increased snoring  History of Present Illness   Patient's LDL maintain that 70 or lower as he is known prior CABG and heart disease.  Blood pressure today is 138/90 we will have patient monitored by continuous present medicine we did discuss lipids which have been obtain weeks prior LDL is excellent.  Patient wishes to decrease his Lipitor 4 mg daily which she can do recheck in 6 weeks' time we'll discuss ongoing research about perfect LDL with tentative research although this is not standard of care showing that below 70 seems be superior for cardiovascular risk reduction.  Patient is no chest pain complaints does note snoring with family concerning might have sleep apnea.  We'll proceed with sleep lab evaluation for same.  Did have elevated LFT we'll get hepatitis profile although patient does not have known exposure to hepatitis per se.  Patient nonsmoker no heavy alcohol family history for heart disease diabetes and high blood pressure.  No drug allergies.  Review of Systems   All other systems reviewed and are negative.      Objective   Vitals:    10/19/18 0758   BP: 138/90   Pulse: 86   Temp: 97.8 °F (36.6 °C)   SpO2: 98%   Weight: 112 kg (246 lb 12.8 oz)     Physical Exam   Constitutional: He appears well-developed and well-nourished.   HENT:   Head: Normocephalic and atraumatic.   Eyes: Pupils are equal, round, and reactive to light. Conjunctivae are normal. No scleral icterus.   Cardiovascular: Normal rate, regular rhythm and normal heart sounds.    Pulmonary/Chest: Effort normal and breath sounds normal.   Abdominal: Soft. Bowel sounds are normal.   Neurological: He is alert.   Unremarkable gait and station   Skin: Skin is warm and dry.   Nursing note and vitals reviewed.      No results found for: INR    Procedures    Assessment/Plan 1.  Hyperlipidemia plan decrease Lipitor to 40 g daily  follow-up CMP and lipids in 6 weeks    2.  Elevated LFT get hepatitis profile    3.  Hypertension continued to treat at present dose continue to monitor    4.  Excess snoring was scheduled sleep lab evaluation with a concern for this.     Formal recheck in 6 months if lab 6 weeks from now if satisfactory.    Much of this encounter note is an electronic transcription/translation of spoken language to printed text.  The electronic translation of spoken language may permit erroneous, or at times, nonsensical words or phrases to be inadvertently transcribed.  Although I have reviewed the note for such errors, some may still exist. If there are questions or for further clarification, please contact me.   Home

## 2020-09-18 ENCOUNTER — OFFICE VISIT (OUTPATIENT)
Dept: FAMILY MEDICINE CLINIC | Facility: CLINIC | Age: 65
End: 2020-09-18

## 2020-09-18 VITALS
BODY MASS INDEX: 36.4 KG/M2 | WEIGHT: 260 LBS | SYSTOLIC BLOOD PRESSURE: 143 MMHG | TEMPERATURE: 97.3 F | RESPIRATION RATE: 18 BRPM | HEART RATE: 80 BPM | DIASTOLIC BLOOD PRESSURE: 78 MMHG | HEIGHT: 71 IN | OXYGEN SATURATION: 96 %

## 2020-09-18 DIAGNOSIS — Z12.5 SCREENING FOR PROSTATE CANCER: ICD-10-CM

## 2020-09-18 DIAGNOSIS — Z00.00 MEDICARE ANNUAL WELLNESS VISIT, INITIAL: Primary | ICD-10-CM

## 2020-09-18 DIAGNOSIS — I10 ESSENTIAL HYPERTENSION: ICD-10-CM

## 2020-09-18 DIAGNOSIS — E29.1 HYPOGONADISM IN MALE: ICD-10-CM

## 2020-09-18 PROCEDURE — G0439 PPPS, SUBSEQ VISIT: HCPCS | Performed by: FAMILY MEDICINE

## 2020-09-18 NOTE — PROGRESS NOTES
The ABCs of the Annual Wellness Visit  Initial Medicare Wellness Visit    Chief Complaint   Patient presents with   • Medicare Wellness-Initial Visit       Subjective   History of Present Illness:  Toy Shannon is a 65 y.o. male who presents for an Initial Medicare Wellness Visit.    HEALTH RISK ASSESSMENT    Recent Hospitalizations:  No hospitalization(s) within the last year.    Current Medical Providers:  Patient Care Team:  Melina Quezada DO as PCP - General (Family Medicine)  Eleazar Benson MD as PCP - Claims Attributed    Smoking Status:  Social History     Tobacco Use   Smoking Status Never Smoker   Smokeless Tobacco Never Used       Alcohol Consumption:  Social History     Substance and Sexual Activity   Alcohol Use Yes    Comment: Hvy Hx ---QUIT @ 40 y/o       Depression Screen:   PHQ-2/PHQ-9 Depression Screening 9/18/2020   Little interest or pleasure in doing things 0   Feeling down, depressed, or hopeless 0   Trouble falling or staying asleep, or sleeping too much 1   Feeling tired or having little energy 1   Poor appetite or overeating 0   Feeling bad about yourself - or that you are a failure or have let yourself or your family down 0   Trouble concentrating on things, such as reading the newspaper or watching television 0   Moving or speaking so slowly that other people could have noticed. Or the opposite - being so fidgety or restless that you have been moving around a lot more than usual 0   Thoughts that you would be better off dead, or of hurting yourself in some way 0   Total Score 2   If you checked off any problems, how difficult have these problems made it for you to do your work, take care of things at home, or get along with other people? Somewhat difficult       Fall Risk Screen:  STEADI Fall Risk Assessment has not been completed.    Health Habits and Functional and Cognitive Screening:  Functional & Cognitive Status 9/18/2020   Do you have difficulty preparing food and eating? No    Do you have difficulty bathing yourself, getting dressed or grooming yourself? No   Do you have difficulty using the toilet? No   Do you have difficulty moving around from place to place? No   Do you have trouble with steps or getting out of a bed or a chair? No   Current Diet Unhealthy Diet   Dental Exam Up to date   Eye Exam Not up to date   Exercise (times per week) 5 times per week   Current Exercise Activities Include Weightlifting   Do you need help using the phone?  No   Are you deaf or do you have serious difficulty hearing?  No   Do you need help with transportation? No   Do you need help shopping? No   Do you need help preparing meals?  No   Do you need help with housework?  No   Do you need help with laundry? No   Do you need help taking your medications? No   Do you need help managing money? No   Do you ever drive or ride in a car without wearing a seat belt? No   Have you felt unusual stress, anger or loneliness in the last month? No   Who do you live with? Spouse   If you need help, do you have trouble finding someone available to you? No   Have you been bothered in the last four weeks by sexual problems? No   Do you have difficulty concentrating, remembering or making decisions? No         Does the patient have evidence of cognitive impairment? No    Asprin use counseling:Taking ASA appropriately as indicated    Age-appropriate Screening Schedule:  Refer to the list below for future screening recommendations based on patient's age, sex and/or medical conditions. Orders for these recommended tests are listed in the plan section. The patient has been provided with a written plan.    Health Maintenance   Topic Date Due   • TDAP/TD VACCINES (1 - Tdap) 03/17/1972   • ZOSTER VACCINE (1 of 2) 03/17/2003   • COLONOSCOPY  01/26/2018   • INFLUENZA VACCINE  08/01/2020   • LIPID PANEL  03/18/2021          The following portions of the patient's history were reviewed and updated as appropriate: allergies,  current medications, past family history, past medical history, past social history, past surgical history and problem list.    Outpatient Medications Prior to Visit   Medication Sig Dispense Refill   • Acetylcysteine 500 MG effervescent tablet Take 500 mg by mouth Daily.     • ALPRAZolam (XANAX) 1 MG tablet Take 1 mg by mouth. Takes .5 tab 5x daily prn     • Ascorbic Acid (VITAMIN C) powder Take 1,000 mg by mouth Daily As Needed.     • aspirin 81 MG chewable tablet Chew 81 mg Daily.     • atenolol (TENORMIN) 25 MG tablet TAKE ONE TABLET BY MOUTH DAILY 90 tablet 1   • atorvastatin (LIPITOR) 80 MG tablet TAKE 1 TABLET BY MOUTH ONE TIME A DAY 90 tablet 0   • coenzyme Q10 100 MG capsule Take 100 mg by mouth Daily.     • fexofenadine (ALLEGRA) 180 MG tablet Take 180 mg by mouth Daily.     • Multiple Vitamins-Minerals (MULTIVITAMIN ADULTS PO) Take  by mouth Daily.     • Omega-3 Fatty Acids (FISH OIL) 1200 MG capsule capsule Take 1,200 mg by mouth Daily.     • Testosterone 20.25 MG/ACT (1.62%) gel APPLY 3 PUMPS TO SHOULDERS IN THE MORNING 75 g 2   • traZODone (DESYREL) 100 MG tablet Take 100 mg by mouth every night at bedtime.     • Triamcinolone Acetonide (NASACORT) 55 MCG/ACT nasal inhaler 2 sprays into the nostril(s) as directed by provider As Needed for Rhinitis.     • Turmeric Curcumin 500 MG capsule Take  by mouth.     • buPROPion XL (WELLBUTRIN XL) 150 MG 24 hr tablet Take 150 mg by mouth Daily.     • Chromium (CHROMIUM GTF) 200 MCG tablet Take 200 mcg by mouth Daily.     • escitalopram (LEXAPRO) 20 MG tablet Take 20 mg by mouth Daily.       No facility-administered medications prior to visit.        Patient Active Problem List   Diagnosis   • HTN (hypertension)   • Generalized anxiety disorder   • ADHD       Advanced Care Planning:  ACP discussion was held with the patient during this visit. Patient has an advance directive (not in EMR), copy requested.    Review of Systems    Compared to one year ago, the patient  "feels his physical health is better.  Compared to one year ago, the patient feels his mental health is better.    Reviewed chart for potential of high risk medication in the elderly: yes  Reviewed chart for potential of harmful drug interactions in the elderly:yes    Objective         Vitals:    09/18/20 1050   BP: 143/78   BP Location: Right arm   Patient Position: Sitting   Cuff Size: Adult   Pulse: 80   Resp: 18   Temp: 97.3 °F (36.3 °C)   TempSrc: Temporal   SpO2: 96%   Weight: 118 kg (260 lb)   Height: 180.3 cm (71\")   PainSc: 0-No pain       Body mass index is 36.26 kg/m².  Discussed the patient's BMI with him. The BMI is above average; BMI management plan is completed.    Physical Exam    Maximum   Score Patient's   Score Questions   5 5 \"What is the year?Season?Date?Day of the week?Month?\"   5 5 \"Where are we now: State?County?Town/city?Hospital?Floor?\"   3 3 3 Unrelated objects Number of trials:___   5 5 Count backward from 100 by sevens or spell WORLD backwards   3 3 Name 3 things from above   2 2 Identify 2 objects   1 1 Repeat the phrase: No ifs, ands,or buts.   3 3 Take paper in right hand, fold it in half, and put it on the floor.   1 1 Please read this and do what it says. \"Close your eyes\"   1 1 Make up and write a sentence about anything. Noun and verb   1 1 Copy this picture 10 angles must be present.   30 30 Total MMSE              Assessment/Plan   Medicare Risks and Personalized Health Plan  CMS Preventative Services Quick Reference  Colon Cancer Screening  Depression/Dysphoria  Diabetic Lab Screening   Hearing Problem  Immunizations Discussed/Encouraged (specific immunizations; Influenza, Pneumococcal 23, Prevnar and Shingrix )  Inactivity/Sedentary  Obesity/Overweight   Prostate Cancer Screening     The above risks/problems have been discussed with the patient.  Pertinent information has been shared with the patient in the After Visit Summary.  Follow up plans and orders are seen below in the " Assessment/Plan Section.    Diagnoses and all orders for this visit:    1. Medicare annual wellness visit, initial (Primary)    2. Hypogonadism in male  -     Testosterone (Free & Total), LC / MS; Future    3. Screening for prostate cancer  -     PSA Screen; Future  -     Comprehensive Metabolic Panel; Future    4. Essential hypertension  -     Comprehensive Metabolic Panel; Future      Follow Up:  No follow-ups on file.     An After Visit Summary and PPPS were given to the patient.

## 2020-10-12 ENCOUNTER — TELEPHONE (OUTPATIENT)
Dept: FAMILY MEDICINE CLINIC | Facility: CLINIC | Age: 65
End: 2020-10-12

## 2020-10-12 RX ORDER — ATORVASTATIN CALCIUM 80 MG/1
80 TABLET, FILM COATED ORAL DAILY
Qty: 90 TABLET | Refills: 0 | Status: SHIPPED | OUTPATIENT
Start: 2020-10-12 | End: 2021-03-09

## 2020-10-12 NOTE — TELEPHONE ENCOUNTER
Last visit:  9/18/20  Next visit: none  Last labs: 3/18/20      Rx requested: Atorvastatin   Pharmacy: [facility] in [place]     Patient called for this refill

## 2020-10-23 ENCOUNTER — CLINICAL SUPPORT (OUTPATIENT)
Dept: FAMILY MEDICINE CLINIC | Facility: CLINIC | Age: 65
End: 2020-10-23

## 2020-10-23 DIAGNOSIS — E29.1 HYPOGONADISM IN MALE: ICD-10-CM

## 2020-10-23 DIAGNOSIS — I10 ESSENTIAL HYPERTENSION: ICD-10-CM

## 2020-10-23 DIAGNOSIS — Z12.5 SCREENING FOR PROSTATE CANCER: ICD-10-CM

## 2020-10-23 PROCEDURE — G0103 PSA SCREENING: HCPCS | Performed by: FAMILY MEDICINE

## 2020-10-23 PROCEDURE — 84403 ASSAY OF TOTAL TESTOSTERONE: CPT | Performed by: FAMILY MEDICINE

## 2020-10-23 PROCEDURE — 80053 COMPREHEN METABOLIC PANEL: CPT | Performed by: FAMILY MEDICINE

## 2020-10-23 PROCEDURE — 84402 ASSAY OF FREE TESTOSTERONE: CPT | Performed by: FAMILY MEDICINE

## 2020-10-23 PROCEDURE — 36415 COLL VENOUS BLD VENIPUNCTURE: CPT | Performed by: FAMILY MEDICINE

## 2020-10-23 PROCEDURE — 85025 COMPLETE CBC W/AUTO DIFF WBC: CPT | Performed by: FAMILY MEDICINE

## 2020-10-24 LAB
ALBUMIN SERPL-MCNC: 4.3 G/DL (ref 3.5–5.2)
ALBUMIN/GLOB SERPL: 1.7 G/DL
ALP SERPL-CCNC: 74 U/L (ref 39–117)
ALT SERPL W P-5'-P-CCNC: 34 U/L (ref 1–41)
ANION GAP SERPL CALCULATED.3IONS-SCNC: 7.2 MMOL/L (ref 5–15)
AST SERPL-CCNC: 26 U/L (ref 1–40)
BASOPHILS # BLD AUTO: 0.04 10*3/MM3 (ref 0–0.2)
BASOPHILS NFR BLD AUTO: 0.6 % (ref 0–1.5)
BILIRUB SERPL-MCNC: 0.3 MG/DL (ref 0–1.2)
BUN SERPL-MCNC: 13 MG/DL (ref 8–23)
BUN/CREAT SERPL: 16.9 (ref 7–25)
CALCIUM SPEC-SCNC: 9.5 MG/DL (ref 8.6–10.5)
CHLORIDE SERPL-SCNC: 103 MMOL/L (ref 98–107)
CO2 SERPL-SCNC: 27.8 MMOL/L (ref 22–29)
CREAT SERPL-MCNC: 0.77 MG/DL (ref 0.76–1.27)
DEPRECATED RDW RBC AUTO: 39.2 FL (ref 37–54)
EOSINOPHIL # BLD AUTO: 0.22 10*3/MM3 (ref 0–0.4)
EOSINOPHIL NFR BLD AUTO: 3.2 % (ref 0.3–6.2)
ERYTHROCYTE [DISTWIDTH] IN BLOOD BY AUTOMATED COUNT: 12.6 % (ref 12.3–15.4)
GFR SERPL CREATININE-BSD FRML MDRD: 101 ML/MIN/1.73
GLOBULIN UR ELPH-MCNC: 2.5 GM/DL
GLUCOSE SERPL-MCNC: 95 MG/DL (ref 65–99)
HCT VFR BLD AUTO: 42 % (ref 37.5–51)
HGB BLD-MCNC: 14.2 G/DL (ref 13–17.7)
IMM GRANULOCYTES # BLD AUTO: 0.02 10*3/MM3 (ref 0–0.05)
IMM GRANULOCYTES NFR BLD AUTO: 0.3 % (ref 0–0.5)
LYMPHOCYTES # BLD AUTO: 2.4 10*3/MM3 (ref 0.7–3.1)
LYMPHOCYTES NFR BLD AUTO: 35.2 % (ref 19.6–45.3)
MCH RBC QN AUTO: 28.9 PG (ref 26.6–33)
MCHC RBC AUTO-ENTMCNC: 33.8 G/DL (ref 31.5–35.7)
MCV RBC AUTO: 85.5 FL (ref 79–97)
MONOCYTES # BLD AUTO: 0.67 10*3/MM3 (ref 0.1–0.9)
MONOCYTES NFR BLD AUTO: 9.8 % (ref 5–12)
NEUTROPHILS NFR BLD AUTO: 3.46 10*3/MM3 (ref 1.7–7)
NEUTROPHILS NFR BLD AUTO: 50.9 % (ref 42.7–76)
NRBC BLD AUTO-RTO: 0 /100 WBC (ref 0–0.2)
PLATELET # BLD AUTO: 249 10*3/MM3 (ref 140–450)
PMV BLD AUTO: 11.2 FL (ref 6–12)
POTASSIUM SERPL-SCNC: 4.5 MMOL/L (ref 3.5–5.2)
PROT SERPL-MCNC: 6.8 G/DL (ref 6–8.5)
PSA SERPL-MCNC: 1.39 NG/ML (ref 0–4)
RBC # BLD AUTO: 4.91 10*6/MM3 (ref 4.14–5.8)
SODIUM SERPL-SCNC: 138 MMOL/L (ref 136–145)
WBC # BLD AUTO: 6.81 10*3/MM3 (ref 3.4–10.8)

## 2020-10-30 DIAGNOSIS — E29.1 HYPOGONADISM IN MALE: ICD-10-CM

## 2020-10-30 LAB
TESTOST FREE SERPL-MCNC: 5.2 PG/ML (ref 6.6–18.1)
TESTOST SERPL-MCNC: 460.6 NG/DL (ref 264–916)

## 2020-10-30 RX ORDER — TESTOSTERONE 16.2 MG/G
4 GEL TRANSDERMAL DAILY
Qty: 176 G | Refills: 2 | Status: SHIPPED | OUTPATIENT
Start: 2020-10-30 | End: 2020-11-16

## 2020-11-11 DIAGNOSIS — E29.1 HYPOGONADISM IN MALE: ICD-10-CM

## 2020-11-11 RX ORDER — TESTOSTERONE 16.2 MG/G
GEL TRANSDERMAL
Qty: 75 G | Refills: 2 | OUTPATIENT
Start: 2020-11-11

## 2020-11-13 DIAGNOSIS — E29.1 HYPOGONADISM IN MALE: ICD-10-CM

## 2020-11-16 RX ORDER — TESTOSTERONE 16.2 MG/G
GEL TRANSDERMAL
Qty: 150 G | Refills: 1 | Status: SHIPPED | OUTPATIENT
Start: 2020-11-16 | End: 2021-01-22 | Stop reason: SDUPTHER

## 2020-11-16 NOTE — TELEPHONE ENCOUNTER
Pt called stating that you increased his testosterone to 4 pumps daily. As 1 bottle only contains 60 pumps, it only lasts about 2 wks and pt is now out and needs refill.

## 2021-01-14 ENCOUNTER — TELEPHONE (OUTPATIENT)
Dept: FAMILY MEDICINE CLINIC | Facility: CLINIC | Age: 66
End: 2021-01-14

## 2021-01-14 DIAGNOSIS — E29.1 HYPOGONADISM IN MALE: Primary | ICD-10-CM

## 2021-01-15 ENCOUNTER — CLINICAL SUPPORT (OUTPATIENT)
Dept: FAMILY MEDICINE CLINIC | Facility: CLINIC | Age: 66
End: 2021-01-15

## 2021-01-15 DIAGNOSIS — E29.1 HYPOGONADISM IN MALE: ICD-10-CM

## 2021-01-15 PROCEDURE — 84402 ASSAY OF FREE TESTOSTERONE: CPT | Performed by: FAMILY MEDICINE

## 2021-01-15 PROCEDURE — 84403 ASSAY OF TOTAL TESTOSTERONE: CPT | Performed by: FAMILY MEDICINE

## 2021-01-15 PROCEDURE — 36415 COLL VENOUS BLD VENIPUNCTURE: CPT | Performed by: FAMILY MEDICINE

## 2021-01-20 LAB
TESTOST FREE SERPL-MCNC: 7.3 PG/ML (ref 6.6–18.1)
TESTOST SERPL-MCNC: 631.3 NG/DL (ref 264–916)

## 2021-01-21 DIAGNOSIS — E29.1 HYPOGONADISM IN MALE: ICD-10-CM

## 2021-01-22 DIAGNOSIS — E29.1 HYPOGONADISM IN MALE: ICD-10-CM

## 2021-01-22 RX ORDER — TESTOSTERONE 16.2 MG/G
GEL TRANSDERMAL
Qty: 150 G | Refills: 1 | Status: SHIPPED | OUTPATIENT
Start: 2021-01-22 | End: 2021-01-26

## 2021-01-25 ENCOUNTER — TELEPHONE (OUTPATIENT)
Dept: FAMILY MEDICINE CLINIC | Facility: CLINIC | Age: 66
End: 2021-01-25

## 2021-01-26 ENCOUNTER — TELEPHONE (OUTPATIENT)
Dept: FAMILY MEDICINE CLINIC | Facility: CLINIC | Age: 66
End: 2021-01-26

## 2021-01-26 DIAGNOSIS — E29.1 HYPOGONADISM IN MALE: ICD-10-CM

## 2021-01-26 RX ORDER — TESTOSTERONE 16.2 MG/G
GEL TRANSDERMAL
Qty: 150 G | Refills: 1 | Status: SHIPPED | OUTPATIENT
Start: 2021-01-26 | End: 2021-03-23 | Stop reason: SDUPTHER

## 2021-01-26 NOTE — TELEPHONE ENCOUNTER
PA for testosterone 1.62% gel was approved.       This approval authorizes your coverage from 10/27/2020 - 01/25/2022, unless we notify you  otherwise, and as long as the following conditions apply:  • you remain enrolled in our Medicare Part D prescription drug plan,  • your physician or other prescriber continues to prescribe the medication for you, and  • the medication continues to be safe for treating your condition

## 2021-03-08 NOTE — TELEPHONE ENCOUNTER
Last visit:  9/18/20  Next visit: none  Last labs: 1/15/21    Rx requested: Atorvastatin   Pharmacy: [facility] in [place]

## 2021-03-09 DIAGNOSIS — E78.5 HYPERLIPIDEMIA, UNSPECIFIED HYPERLIPIDEMIA TYPE: Primary | ICD-10-CM

## 2021-03-09 RX ORDER — ATORVASTATIN CALCIUM 80 MG/1
TABLET, FILM COATED ORAL
Qty: 90 TABLET | Refills: 0 | Status: SHIPPED | OUTPATIENT
Start: 2021-03-09 | End: 2021-04-23 | Stop reason: SDUPTHER

## 2021-03-23 ENCOUNTER — TELEPHONE (OUTPATIENT)
Dept: FAMILY MEDICINE CLINIC | Facility: CLINIC | Age: 66
End: 2021-03-23

## 2021-03-23 DIAGNOSIS — E29.1 HYPOGONADISM IN MALE: ICD-10-CM

## 2021-03-23 RX ORDER — TESTOSTERONE 16.2 MG/G
GEL TRANSDERMAL
Qty: 150 G | Refills: 1 | Status: SHIPPED | OUTPATIENT
Start: 2021-03-23 | End: 2021-04-23 | Stop reason: SDUPTHER

## 2021-03-23 NOTE — TELEPHONE ENCOUNTER
PATIENT CALLING IN REGARDS TO HAVE HIS TESTORENE GEL BE SENT OVER TO THE [facility] DOWN THE ROAD FROM HIM BECAUSE ITS RUNNING 200$ CHEAPER.     TESTOSTERONE 20.25 MG/ACT (1.62%) GEL    PHARMACY:  [facility] [address] - [phone]  - [phone] FX    PLEASE ADVISE, THANK YOU!

## 2021-04-09 RX ORDER — ATENOLOL 25 MG/1
25 TABLET ORAL DAILY
Qty: 90 TABLET | Refills: 0 | Status: SHIPPED | OUTPATIENT
Start: 2021-04-09 | End: 2021-04-23 | Stop reason: SDUPTHER

## 2021-04-16 ENCOUNTER — CLINICAL SUPPORT (OUTPATIENT)
Dept: FAMILY MEDICINE CLINIC | Facility: CLINIC | Age: 66
End: 2021-04-16

## 2021-04-16 DIAGNOSIS — E78.5 HYPERLIPIDEMIA, UNSPECIFIED HYPERLIPIDEMIA TYPE: ICD-10-CM

## 2021-04-16 PROCEDURE — 36415 COLL VENOUS BLD VENIPUNCTURE: CPT | Performed by: FAMILY MEDICINE

## 2021-04-16 PROCEDURE — 80061 LIPID PANEL: CPT | Performed by: FAMILY MEDICINE

## 2021-04-16 PROCEDURE — 80053 COMPREHEN METABOLIC PANEL: CPT | Performed by: FAMILY MEDICINE

## 2021-04-17 LAB
ALBUMIN SERPL-MCNC: 4.2 G/DL (ref 3.5–5.2)
ALBUMIN/GLOB SERPL: 1.6 G/DL
ALP SERPL-CCNC: 84 U/L (ref 39–117)
ALT SERPL W P-5'-P-CCNC: 34 U/L (ref 1–41)
ANION GAP SERPL CALCULATED.3IONS-SCNC: 9.7 MMOL/L (ref 5–15)
AST SERPL-CCNC: 29 U/L (ref 1–40)
BILIRUB SERPL-MCNC: 0.4 MG/DL (ref 0–1.2)
BUN SERPL-MCNC: 12 MG/DL (ref 8–23)
BUN/CREAT SERPL: 12.8 (ref 7–25)
CALCIUM SPEC-SCNC: 9 MG/DL (ref 8.6–10.5)
CHLORIDE SERPL-SCNC: 100 MMOL/L (ref 98–107)
CHOLEST SERPL-MCNC: 157 MG/DL (ref 0–200)
CO2 SERPL-SCNC: 27.3 MMOL/L (ref 22–29)
CREAT SERPL-MCNC: 0.94 MG/DL (ref 0.76–1.27)
GFR SERPL CREATININE-BSD FRML MDRD: 80 ML/MIN/1.73
GLOBULIN UR ELPH-MCNC: 2.7 GM/DL
GLUCOSE SERPL-MCNC: 100 MG/DL (ref 65–99)
HDLC SERPL-MCNC: 43 MG/DL (ref 40–60)
LDLC SERPL CALC-MCNC: 92 MG/DL (ref 0–100)
LDLC/HDLC SERPL: 2.07 {RATIO}
POTASSIUM SERPL-SCNC: 4.6 MMOL/L (ref 3.5–5.2)
PROT SERPL-MCNC: 6.9 G/DL (ref 6–8.5)
SODIUM SERPL-SCNC: 137 MMOL/L (ref 136–145)
TRIGL SERPL-MCNC: 125 MG/DL (ref 0–150)
VLDLC SERPL-MCNC: 22 MG/DL (ref 5–40)

## 2021-04-23 ENCOUNTER — OFFICE VISIT (OUTPATIENT)
Dept: FAMILY MEDICINE CLINIC | Facility: CLINIC | Age: 66
End: 2021-04-23

## 2021-04-23 VITALS
WEIGHT: 258 LBS | HEIGHT: 71 IN | DIASTOLIC BLOOD PRESSURE: 77 MMHG | SYSTOLIC BLOOD PRESSURE: 135 MMHG | TEMPERATURE: 97.7 F | OXYGEN SATURATION: 97 % | BODY MASS INDEX: 36.12 KG/M2 | HEART RATE: 65 BPM | RESPIRATION RATE: 16 BRPM

## 2021-04-23 DIAGNOSIS — Z12.5 SCREENING FOR PROSTATE CANCER: ICD-10-CM

## 2021-04-23 DIAGNOSIS — E29.1 HYPOGONADISM IN MALE: ICD-10-CM

## 2021-04-23 DIAGNOSIS — G44.039 EPISODIC PAROXYSMAL HEMICRANIA, NOT INTRACTABLE: Primary | Chronic | ICD-10-CM

## 2021-04-23 DIAGNOSIS — I10 ESSENTIAL HYPERTENSION: ICD-10-CM

## 2021-04-23 PROCEDURE — 99214 OFFICE O/P EST MOD 30 MIN: CPT | Performed by: FAMILY MEDICINE

## 2021-04-23 RX ORDER — ONDANSETRON 4 MG/1
4 TABLET, ORALLY DISINTEGRATING ORAL EVERY 8 HOURS PRN
Qty: 20 TABLET | Refills: 0 | Status: SHIPPED | OUTPATIENT
Start: 2021-04-23 | End: 2021-11-19 | Stop reason: SDUPTHER

## 2021-04-23 RX ORDER — TESTOSTERONE 16.2 MG/G
GEL TRANSDERMAL
Qty: 150 G | Refills: 3 | Status: SHIPPED | OUTPATIENT
Start: 2021-04-23 | End: 2021-10-08

## 2021-04-23 RX ORDER — ATORVASTATIN CALCIUM 80 MG/1
80 TABLET, FILM COATED ORAL DAILY
Qty: 90 TABLET | Refills: 2 | Status: SHIPPED | OUTPATIENT
Start: 2021-04-23 | End: 2021-12-13 | Stop reason: SDUPTHER

## 2021-04-23 RX ORDER — RIZATRIPTAN BENZOATE 10 MG/1
TABLET ORAL
Qty: 9 TABLET | Refills: 1 | Status: SHIPPED | OUTPATIENT
Start: 2021-04-23 | End: 2021-07-06

## 2021-04-23 RX ORDER — ATENOLOL 25 MG/1
25 TABLET ORAL DAILY
Qty: 90 TABLET | Refills: 1 | Status: SHIPPED | OUTPATIENT
Start: 2021-04-23 | End: 2021-11-19 | Stop reason: SDUPTHER

## 2021-04-23 NOTE — PROGRESS NOTES
Subjective   Toy Shannon is a 66 y.o. male.     Chief Complaint   Patient presents with   • Hypogonadism     Discuss blood work results   • Headache     patient believes he is having migraines.   • Hypertension   • Hyperlipidemia         Current Outpatient Medications:   •  ALPRAZolam (XANAX) 1 MG tablet, Take 1 mg by mouth. Takes .5 tab 4x daily prn, Disp: , Rfl:   •  aspirin 81 MG chewable tablet, Chew 81 mg Daily., Disp: , Rfl:   •  atenolol (TENORMIN) 25 MG tablet, Take 1 tablet by mouth Daily., Disp: 90 tablet, Rfl: 1  •  atorvastatin (LIPITOR) 80 MG tablet, Take 1 tablet by mouth Daily., Disp: 90 tablet, Rfl: 2  •  coenzyme Q10 100 MG capsule, Take 100 mg by mouth Daily., Disp: , Rfl:   •  Multiple Vitamins-Minerals (MULTIVITAMIN ADULTS PO), Take  by mouth Daily., Disp: , Rfl:   •  Omega-3 Fatty Acids (FISH OIL) 1200 MG capsule capsule, Take 1,200 mg by mouth Daily., Disp: , Rfl:   •  Testosterone 20.25 MG/ACT (1.62%) gel, APPLY 2 PUMPS TO EACH SHOULDER DAILY AS DIRECTED, Disp: 150 g, Rfl: 3  •  traZODone (DESYREL) 100 MG tablet, Take 100 mg by mouth every night at bedtime., Disp: , Rfl:   •  Triamcinolone Acetonide (NASACORT) 55 MCG/ACT nasal inhaler, 2 sprays into the nostril(s) as directed by provider As Needed for Rhinitis., Disp: , Rfl:   •  Turmeric Curcumin 500 MG capsule, Take  by mouth., Disp: , Rfl:   •  ondansetron ODT (Zofran ODT) 4 MG disintegrating tablet, Place 1 tablet on the tongue Every 8 (Eight) Hours As Needed for Nausea or Vomiting., Disp: 20 tablet, Rfl: 0  •  rizatriptan (Maxalt) 10 MG tablet, 1 po prn PIÑA and May repeat every 2 hours if needed to max of 3 tabs/ day, Disp: 9 tablet, Rfl: 1    Past Medical History:   Diagnosis Date   • Anxiety    • ASCVD    • Headache    • Hypercholesteremia    • Hypogonadism in male    • RADHA/ CPAP     ------Dr. Garcia   • Panic attack     [facility]       Past Surgical History:   Procedure Laterality Date   • CARDIAC CATHETERIZATION      2011   •  COLONOSCOPY      COLOGUARD---NEG = 2019, rech 2022   • CORONARY ARTERY BYPASS GRAFT  2011    X 2    Dr. Ayers ([facility])   • VASECTOMY         Family History   Problem Relation Age of Onset   • Hypertension Mother    • Parkinsonism Mother    • Anxiety disorder Mother    • Dementia Mother    • Heart disease Father    • Diabetes Sister    • Liver disease Sister         fatty liver   • Parkinsonism Sister    • Dementia Sister        Social History     Socioeconomic History   • Marital status:      Spouse name: Not on file   • Number of children: Not on file   • Years of education: Not on file   • Highest education level: Not on file   Tobacco Use   • Smoking status: Never Smoker   • Smokeless tobacco: Never Used   Vaping Use   • Vaping Use: Never used   Substance and Sexual Activity   • Alcohol use: Not Currently     Comment: Hvy Hx ---QUIT @ 40 y/o   • Drug use: No   • Sexual activity: Defer       67 y/o C male here for f/u on HTN/ Hypogonadism/ CHOL/ Anx     Pt states he is taking Xanax qid for anxiety and had used zoloft the first time he was treated for anx and did well---He took it for awhile and then stopped it; Pt states he tried it the second time he was doing bad but it didn't work so he tried Lexapro which also didn't help and they stopped it and started Xanax-----[facility] is managing this for him and they are now trying to wean him off of it    Pt states he feels the HRT gel is working fine    Pt having HA's lately and needing med to tx them since otc not working    Pt ama. current meds/ doses w/o difficulty       The following portions of the patient's history were reviewed and updated as appropriate: allergies, current medications, past family history, past medical history, past social history, past surgical history and problem list.    Review of Systems   Constitutional: Negative for activity change, fatigue and unexpected weight gain.   Eyes: Negative for photophobia.    Respiratory: Negative for cough, chest tightness and shortness of breath.    Cardiovascular: Negative for chest pain, palpitations and leg swelling.   Gastrointestinal: Positive for nausea. Negative for vomiting.   Genitourinary: Negative for frequency, urgency and urinary incontinence.   Musculoskeletal: Negative for arthralgias and myalgias.   Neurological: Positive for headache. Negative for dizziness, facial asymmetry, speech difficulty, weakness, light-headedness, memory problem and confusion.       Vitals:    04/23/21 0924   BP: 135/77   Pulse: 65   Resp: 16   Temp: 97.7 °F (36.5 °C)   SpO2: 97%       Objective   Physical Exam  Vitals and nursing note reviewed.   Constitutional:       General: He is not in acute distress.     Appearance: Normal appearance. He is well-developed and overweight. He is not ill-appearing or toxic-appearing.   HENT:      Head: Normocephalic and atraumatic.   Cardiovascular:      Rate and Rhythm: Normal rate and regular rhythm.      Heart sounds: Normal heart sounds. No murmur heard.     Pulmonary:      Effort: Pulmonary effort is normal.      Breath sounds: Normal breath sounds. No wheezing, rhonchi or rales.   Musculoskeletal:      Cervical back: Normal range of motion and neck supple.   Skin:     General: Skin is warm and dry.      Findings: No rash.   Neurological:      Mental Status: He is alert and oriented to person, place, and time.      Cranial Nerves: No cranial nerve deficit.   Psychiatric:         Mood and Affect: Mood normal.         Behavior: Behavior normal.         Thought Content: Thought content normal.         Judgment: Judgment normal.           Assessment/Plan   Diagnoses and all orders for this visit:    1. 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 Essential hypertension    3. Hypogonadism in male  -     Testosterone 20.25 MG/ACT (1.62%) gel; APPLY 2 PUMPS TO EACH SHOULDER DAILY AS DIRECTED  Dispense: 150 g; Refill: 3  -     Testosterone  (Free & Total), LC / MS; Future  -     CBC & Differential; Future    4. Screening for prostate cancer  -     PSA Screen; Future    Other orders  -     ondansetron ODT (Zofran ODT) 4 MG disintegrating tablet; Place 1 tablet on the tongue Every 8 (Eight) Hours As Needed for Nausea or Vomiting.  Dispense: 20 tablet; Refill: 0  -     rizatriptan (Maxalt) 10 MG tablet; 1 po prn PIÑA and May repeat every 2 hours if needed to max of 3 tabs/ day  Dispense: 9 tablet; Refill: 1  -     atorvastatin (LIPITOR) 80 MG tablet; Take 1 tablet by mouth Daily.  Dispense: 90 tablet; Refill: 2  -     atenolol (TENORMIN) 25 MG tablet; Take 1 tablet by mouth Daily.  Dispense: 90 tablet; Refill: 1    Recent labs reviewed w/ pt/ DIsc'd  Trial of Maxalt 10mg prn/ Zofran prn  Med refills done  F/u lab in Oct

## 2021-04-24 PROBLEM — F41.1 GENERALIZED ANXIETY DISORDER: Chronic | Status: ACTIVE | Noted: 2017-03-31

## 2021-06-03 PROCEDURE — 90714 TD VACC NO PRESV 7 YRS+ IM: CPT | Performed by: PHYSICIAN ASSISTANT

## 2021-06-03 PROCEDURE — 99213 OFFICE O/P EST LOW 20 MIN: CPT | Performed by: PHYSICIAN ASSISTANT

## 2021-06-03 PROCEDURE — 90471 IMMUNIZATION ADMIN: CPT | Performed by: PHYSICIAN ASSISTANT

## 2021-06-11 ENCOUNTER — OFFICE VISIT (OUTPATIENT)
Dept: CARDIOLOGY | Facility: CLINIC | Age: 66
End: 2021-06-11

## 2021-06-11 VITALS
HEART RATE: 75 BPM | BODY MASS INDEX: 35.42 KG/M2 | DIASTOLIC BLOOD PRESSURE: 72 MMHG | SYSTOLIC BLOOD PRESSURE: 122 MMHG | HEIGHT: 71 IN | WEIGHT: 253 LBS

## 2021-06-11 DIAGNOSIS — I25.810 CORONARY ARTERY DISEASE INVOLVING CORONARY BYPASS GRAFT OF NATIVE HEART WITHOUT ANGINA PECTORIS: Primary | ICD-10-CM

## 2021-06-11 DIAGNOSIS — I10 ESSENTIAL HYPERTENSION: ICD-10-CM

## 2021-06-11 DIAGNOSIS — E78.2 MIXED HYPERLIPIDEMIA: ICD-10-CM

## 2021-06-11 PROCEDURE — 99214 OFFICE O/P EST MOD 30 MIN: CPT | Performed by: NURSE PRACTITIONER

## 2021-06-11 PROCEDURE — 93000 ELECTROCARDIOGRAM COMPLETE: CPT | Performed by: NURSE PRACTITIONER

## 2021-06-11 NOTE — PROGRESS NOTES
"Date of Office Visit: 21  Encounter Provider: HUEY Sommers  Place of Service: [facility] CARDIOLOGY  Patient Name: Toy Shannon  :1955    Chief Complaint   Patient presents with   • Coronary artery disease involving coronary bypass graft of n   :     HPI: Toy Shannon is a 66 y.o. male  with history of hypertension, hyperlipidemia, ischemic cardiomyopathy, obstructive sleep apnea, right bundle branch block coronary artery disease and myocardial infarction.        He was followed by Dr. Ayers.  I will visit with him for the first time today and have reviewed his medical record.    In 2011 he presented with symptoms and abnormal perfusion stress test which showed an ejection fraction of 35% with severe ischemia.  He had severe disease of the proximal LAD and severe disease of the right coronary artery.  He underwent coronary artery bypass grafting with LIMA to LAD and vein graft to the right coronary artery.  Follow-up echo May 2011 showed ejection fraction improved to 45% with inferior hypokinesis.    He now presents for reassessment.  Is been compliant with CPAP.  He started a strict weight loss program with a new diet.  He is already down 10 pounds in a week.  He is doing some weight lifting and jogging at the [facility].  He does some physical activity 5 to 6 days a week.  He had a tick bite neck infected and was treated with antibiotic, tetanus shot.  He is compliant with CPAP.  He denies chest pain tightness pressure, palpitation, edema, near-syncope or syncope.  Overall, his been doing very well.    No Known Allergies        Family and social history reviewed.     ROS  All other systems were reviewed and are negative          Objective:     Vitals:    21 0800   BP: 122/72   BP Location: Left arm   Patient Position: Sitting   Pulse: 75   Weight: 115 kg (253 lb)   Height: 180.3 cm (71\")     Body mass index is 35.29 kg/m².    PHYSICAL " EXAM:  Constitutional:       General: Not in acute distress.     Appearance: Well-developed. Not diaphoretic.   HENT:      Head: Normocephalic.   Pulmonary:      Effort: Pulmonary effort is normal. No respiratory distress.      Breath sounds: Normal breath sounds. No wheezing. No rhonchi. No rales.   Cardiovascular:      Normal rate. Regular rhythm.   Pulses:     Radial: 2+ bilaterally.  Skin:     General: Skin is warm and dry. There is no cyanosis.      Findings: No rash.   Neurological:      Mental Status: Alert and oriented to person, place, and time.   Psychiatric:         Behavior: Behavior normal.         Thought Content: Thought content normal.         Judgment: Judgment normal.           ECG 12 Lead    Date/Time: 6/11/2021 8:19 AM  Performed by: Taylor Rodriguez APRN  Authorized by: Taylor Rodriguez APRN   Comparison: compared with previous ECG   Similar to previous ECG  Rhythm: sinus rhythm  Rate: normal  Conduction: right bundle branch block  QRS axis: left    Clinical impression: abnormal EKG              Current Outpatient Medications   Medication Sig Dispense Refill   • ALPRAZolam (XANAX) 1 MG tablet Take 1 mg by mouth. Takes .5 tab 4x daily prn     • aspirin 81 MG chewable tablet Chew 81 mg Daily.     • atenolol (TENORMIN) 25 MG tablet Take 1 tablet by mouth Daily. 90 tablet 1   • atorvastatin (LIPITOR) 80 MG tablet Take 1 tablet by mouth Daily. 90 tablet 2   • coenzyme Q10 100 MG capsule Take 100 mg by mouth Daily.     • Multiple Vitamins-Minerals (MULTIVITAMIN ADULTS PO) Take  by mouth Daily.     • Omega-3 Fatty Acids (FISH OIL) 1200 MG capsule capsule Take 1,200 mg by mouth Daily.     • ondansetron ODT (Zofran ODT) 4 MG disintegrating tablet Place 1 tablet on the tongue Every 8 (Eight) Hours As Needed for Nausea or Vomiting. 20 tablet 0   • rizatriptan (Maxalt) 10 MG tablet 1 po prn PIÑA and May repeat every 2 hours if needed to max of 3 tabs/ day 9 tablet 1   • Testosterone 20.25 MG/ACT (1.62%) gel APPLY  2 PUMPS TO EACH SHOULDER DAILY AS DIRECTED 150 g 3   • traZODone (DESYREL) 100 MG tablet Take 100 mg by mouth every night at bedtime.     • Triamcinolone Acetonide (NASACORT) 55 MCG/ACT nasal inhaler 2 sprays into the nostril(s) as directed by provider As Needed for Rhinitis.     • Turmeric Curcumin 500 MG capsule Take  by mouth.       No current facility-administered medications for this visit.     Assessment:       Diagnosis Plan   1. Coronary artery disease involving coronary bypass graft of native heart without angina pectoris  ECG 12 Lead   2. Mixed hyperlipidemia     3. Essential hypertension          Orders Placed This Encounter   Procedures   • ECG 12 Lead     This order was created via procedure documentation     Order Specific Question:   Release to patient     Answer:   Immediate         Plan:       1.  66-year-old gentleman with history of coronary artery disease, prior ejection fraction 30% status post coronary bypass grafting with LIMA to LAD and vein graft to right coronary artery in March 2011.  Follow-up echo May 2011 showed ejection fraction of 45%-50 % with inferior wall hypokinesis   -He is asymptomatic continue the same follow-up in 1 year  2.  Hypertension on high intensity atorvastatin.  Last LDL 92.  Target LDL 70 or less  3.  Hypertension blood pressure adequately controlled  4.  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  Obstructive sleep apnea on CPAP and compliant      Call with questions or concerns.  I will have him meet Dr. Rivera next visit             It has been a pleasure to participate in this patient's care.      Thank you,  HUEY Sommers      **I used Dragon to dictate this note:**

## 2021-07-06 RX ORDER — RIZATRIPTAN BENZOATE 10 MG/1
TABLET ORAL
Qty: 9 TABLET | Refills: 0 | Status: SHIPPED | OUTPATIENT
Start: 2021-07-06 | End: 2021-11-19 | Stop reason: SDUPTHER

## 2021-10-08 DIAGNOSIS — E29.1 HYPOGONADISM IN MALE: ICD-10-CM

## 2021-10-08 RX ORDER — TESTOSTERONE 20.25 MG/1.25G
GEL TOPICAL
Qty: 150 G | Refills: 0 | Status: SHIPPED | OUTPATIENT
Start: 2021-10-08 | End: 2021-11-19

## 2021-10-08 NOTE — TELEPHONE ENCOUNTER
Rx Refill Note  Requested Prescriptions     Pending Prescriptions Disp Refills   • Testosterone 1.62 % gel [Pharmacy Med Name: TESTOSTERONE 1.62% GEL PUMP]       Sig: APPLY 2 PUMP ACTUATIONS (40.5 MG/2.5 GM) TOPICALLY ONCE DAILY TO SHOULDERS AND UPPER ARMS      Last office visit with prescribing clinician: 4/23/2021      Next office visit with prescribing clinician: Visit date not found     Lipid Panel (04/16/2021 08:32)  Comprehensive Metabolic Panel (04/16/2021 08:32)         Citlaly Lazo CMA  10/08/21, 11:39 EDT

## 2021-10-29 ENCOUNTER — CLINICAL SUPPORT (OUTPATIENT)
Dept: FAMILY MEDICINE CLINIC | Facility: CLINIC | Age: 66
End: 2021-10-29

## 2021-10-29 DIAGNOSIS — E29.1 HYPOGONADISM IN MALE: ICD-10-CM

## 2021-10-29 DIAGNOSIS — Z12.5 SCREENING FOR PROSTATE CANCER: ICD-10-CM

## 2021-10-29 LAB
BASOPHILS # BLD AUTO: 0.05 10*3/MM3 (ref 0–0.2)
BASOPHILS NFR BLD AUTO: 0.7 % (ref 0–1.5)
DEPRECATED RDW RBC AUTO: 40.2 FL (ref 37–54)
EOSINOPHIL # BLD AUTO: 0.15 10*3/MM3 (ref 0–0.4)
EOSINOPHIL NFR BLD AUTO: 2 % (ref 0.3–6.2)
ERYTHROCYTE [DISTWIDTH] IN BLOOD BY AUTOMATED COUNT: 12.5 % (ref 12.3–15.4)
HCT VFR BLD AUTO: 45.2 % (ref 37.5–51)
HGB BLD-MCNC: 14.9 G/DL (ref 13–17.7)
IMM GRANULOCYTES # BLD AUTO: 0.02 10*3/MM3 (ref 0–0.05)
IMM GRANULOCYTES NFR BLD AUTO: 0.3 % (ref 0–0.5)
LYMPHOCYTES # BLD AUTO: 2.24 10*3/MM3 (ref 0.7–3.1)
LYMPHOCYTES NFR BLD AUTO: 30 % (ref 19.6–45.3)
MCH RBC QN AUTO: 28.9 PG (ref 26.6–33)
MCHC RBC AUTO-ENTMCNC: 33 G/DL (ref 31.5–35.7)
MCV RBC AUTO: 87.8 FL (ref 79–97)
MONOCYTES # BLD AUTO: 0.75 10*3/MM3 (ref 0.1–0.9)
MONOCYTES NFR BLD AUTO: 10.1 % (ref 5–12)
NEUTROPHILS NFR BLD AUTO: 4.25 10*3/MM3 (ref 1.7–7)
NEUTROPHILS NFR BLD AUTO: 56.9 % (ref 42.7–76)
NRBC BLD AUTO-RTO: 0 /100 WBC (ref 0–0.2)
PLATELET # BLD AUTO: 289 10*3/MM3 (ref 140–450)
PMV BLD AUTO: 11 FL (ref 6–12)
RBC # BLD AUTO: 5.15 10*6/MM3 (ref 4.14–5.8)
WBC # BLD AUTO: 7.46 10*3/MM3 (ref 3.4–10.8)

## 2021-10-29 PROCEDURE — 84402 ASSAY OF FREE TESTOSTERONE: CPT | Performed by: FAMILY MEDICINE

## 2021-10-29 PROCEDURE — 36415 COLL VENOUS BLD VENIPUNCTURE: CPT | Performed by: FAMILY MEDICINE

## 2021-10-29 PROCEDURE — 84403 ASSAY OF TOTAL TESTOSTERONE: CPT | Performed by: FAMILY MEDICINE

## 2021-10-29 PROCEDURE — 85025 COMPLETE CBC W/AUTO DIFF WBC: CPT | Performed by: FAMILY MEDICINE

## 2021-10-29 PROCEDURE — G0103 PSA SCREENING: HCPCS | Performed by: FAMILY MEDICINE

## 2021-10-30 LAB — PSA SERPL-MCNC: 1.39 NG/ML (ref 0–4)

## 2021-11-01 ENCOUNTER — TELEPHONE (OUTPATIENT)
Dept: FAMILY MEDICINE CLINIC | Facility: CLINIC | Age: 66
End: 2021-11-01

## 2021-11-01 NOTE — TELEPHONE ENCOUNTER
Rx Refill Note  Requested Prescriptions     Pending Prescriptions Disp Refills   • ondansetron ODT (ZOFRAN-ODT) 4 MG disintegrating tablet [Pharmacy Med Name: ONDANSETRON ODT 4 MG TABLET] 20 tablet 0     Sig: DISSOLVE ONE TABLET BY MOUTH EVERY 8 HOURS AS NEEDED FOR NAUSEA OR VOMITING   • rizatriptan (MAXALT) 10 MG tablet [Pharmacy Med Name: RIZATRIPTAN 10 MG TABLET] 9 tablet 0     Sig: TAKE ONE TABLET BY MOUTH AS NEEDED FOR HEADACHE AND MAY REPEAT EVERY 2 HOURS IF NEEDED. MAX OF 3 TABLETS/PER DAY      Last office visit with prescribing clinician: 4/23/2021      Next office visit with prescribing clinician: 11/1/2021     CBC & Differential (10/29/2021 08:36)  Lipid Panel (04/16/2021 08:32)  Comprehensive Metabolic Panel (04/16/2021 08:32)         Citlaly Lazo CMA  11/01/21, 12:44 EDT

## 2021-11-02 RX ORDER — RIZATRIPTAN BENZOATE 10 MG/1
TABLET ORAL
Qty: 9 TABLET | Refills: 0 | OUTPATIENT
Start: 2021-11-02

## 2021-11-02 RX ORDER — ONDANSETRON 4 MG/1
TABLET, ORALLY DISINTEGRATING ORAL
Qty: 20 TABLET | Refills: 0 | OUTPATIENT
Start: 2021-11-02

## 2021-11-05 LAB
TESTOST FREE SERPL-MCNC: 17.4 PG/ML (ref 6.6–18.1)
TESTOST SERPL-MCNC: 1094.9 NG/DL (ref 264–916)

## 2021-11-19 ENCOUNTER — OFFICE VISIT (OUTPATIENT)
Dept: FAMILY MEDICINE CLINIC | Facility: CLINIC | Age: 66
End: 2021-11-19

## 2021-11-19 VITALS
TEMPERATURE: 97.7 F | HEART RATE: 54 BPM | HEIGHT: 71 IN | OXYGEN SATURATION: 99 % | DIASTOLIC BLOOD PRESSURE: 61 MMHG | WEIGHT: 228 LBS | BODY MASS INDEX: 31.92 KG/M2 | SYSTOLIC BLOOD PRESSURE: 123 MMHG | RESPIRATION RATE: 16 BRPM

## 2021-11-19 DIAGNOSIS — E78.5 HYPERLIPIDEMIA, UNSPECIFIED HYPERLIPIDEMIA TYPE: Primary | ICD-10-CM

## 2021-11-19 DIAGNOSIS — E29.1 HYPOGONADISM IN MALE: ICD-10-CM

## 2021-11-19 PROCEDURE — 99213 OFFICE O/P EST LOW 20 MIN: CPT | Performed by: FAMILY MEDICINE

## 2021-11-19 RX ORDER — TESTOSTERONE 20.25 MG/1.25G
2 GEL TOPICAL EVERY MORNING
Qty: 150 G | Refills: 3 | Status: SHIPPED | OUTPATIENT
Start: 2021-11-19 | End: 2021-11-22 | Stop reason: SDUPTHER

## 2021-11-19 RX ORDER — RIZATRIPTAN BENZOATE 10 MG/1
TABLET ORAL
Qty: 9 TABLET | Refills: 2 | Status: SHIPPED | OUTPATIENT
Start: 2021-11-19 | End: 2022-01-04

## 2021-11-19 RX ORDER — ONDANSETRON 4 MG/1
4 TABLET, ORALLY DISINTEGRATING ORAL EVERY 8 HOURS PRN
Qty: 40 TABLET | Refills: 0 | Status: SHIPPED | OUTPATIENT
Start: 2021-11-19 | End: 2022-02-28

## 2021-11-19 RX ORDER — ATENOLOL 25 MG/1
25 TABLET ORAL NIGHTLY
Qty: 90 TABLET | Refills: 1 | Status: SHIPPED | OUTPATIENT
Start: 2021-11-19 | End: 2022-07-11

## 2021-11-22 ENCOUNTER — TELEPHONE (OUTPATIENT)
Dept: FAMILY MEDICINE CLINIC | Facility: CLINIC | Age: 66
End: 2021-11-22

## 2021-11-22 DIAGNOSIS — E29.1 HYPOGONADISM IN MALE: ICD-10-CM

## 2021-11-22 RX ORDER — TESTOSTERONE 20.25 MG/1.25G
GEL TOPICAL
Qty: 150 G | Refills: 3 | Status: SHIPPED | OUTPATIENT
Start: 2021-11-22 | End: 2022-05-18

## 2021-11-22 NOTE — TELEPHONE ENCOUNTER
Caller: Toy Shannon    Relationship to patient: Self    Best call back number: [phone]    Patient is needing: PATIENT HAD APPT WITH DR. HUNTER ON FRIDAY AND SHE REFILLED HIS Testosterone 20.25 MG/1.25GM (1.62%) gel, HOWEVER THE PHARMACY STATED THAT THE DIRECTIONS SAY 2 PUMPS PER DAY AND DR. HUNTER TOLD HIM TO USE 4 PUMPS PER DAY.     PATIENT CONFIRMED PHARMACY:   [facility] - [phone]  - [phone] FX

## 2021-11-23 NOTE — PROGRESS NOTES
Subjective   Toy Shannon is a 66 y.o. male.     Chief Complaint   Patient presents with   • Med Refill     maxalt,zofran    • Results     discuss lab results          Current Outpatient Medications:   •  ALPRAZolam (XANAX) 1 MG tablet, Take 1 mg by mouth. Takes .5 tab 4x daily prn, Disp: , Rfl:   •  aspirin 81 MG chewable tablet, Chew 81 mg Daily., Disp: , Rfl:   •  atenolol (TENORMIN) 25 MG tablet, Take 1 tablet by mouth Every Night., Disp: 90 tablet, Rfl: 1  •  atorvastatin (LIPITOR) 80 MG tablet, Take 1 tablet by mouth Daily., Disp: 90 tablet, Rfl: 2  •  coenzyme Q10 100 MG capsule, Take 100 mg by mouth Daily., Disp: , Rfl:   •  Multiple Vitamins-Minerals (MULTIVITAMIN ADULTS PO), Take  by mouth Daily., Disp: , Rfl:   •  Omega-3 Fatty Acids (FISH OIL) 1200 MG capsule capsule, Take 1,200 mg by mouth Daily., Disp: , Rfl:   •  ondansetron ODT (Zofran ODT) 4 MG disintegrating tablet, Place 1 tablet on the tongue Every 8 (Eight) Hours As Needed for Nausea or Vomiting., Disp: 40 tablet, Rfl: 0  •  rizatriptan (MAXALT) 10 MG tablet, TAKE ONE TABLET BY MOUTH AS NEEDED FOR HEADACHE AND MAY REPEAT EVERY 2 HOURS IF NEEDED. MAX OF 3 TABLETS/DAY, Disp: 9 tablet, Rfl: 2  •  traZODone (DESYREL) 100 MG tablet, Take 100 mg by mouth every night at bedtime., Disp: , Rfl:   •  Triamcinolone Acetonide (NASACORT) 55 MCG/ACT nasal inhaler, 2 sprays into the nostril(s) as directed by provider As Needed for Rhinitis., Disp: , Rfl:   •  Turmeric Curcumin 500 MG capsule, Take  by mouth., Disp: , Rfl:   •  Testosterone 20.25 MG/1.25GM (1.62%) gel, Place 2 application on the skin as directed by provider Every Morning., Disp: 150 g, Rfl: 3    Past Medical History:   Diagnosis Date   • Anxiety    • ASCVD    • Headache    • Hypercholesteremia    • Hypogonadism in male    • RADHA/ CPAP     ------Dr. Garcia   • Panic attack     [facility]---Dr Munguia   • PSA     2019= 1.07/ 2020=1.39/ 2021=1.39       Past Surgical History:   Procedure  Laterality Date   • CARDIAC CATHETERIZATION      2011   • COLONOSCOPY      COLOGUARD---NEG = 2019, rech 2022   • CORONARY ARTERY BYPASS GRAFT  2011    X 2    Dr. Ayers ([facility])   • VASECTOMY         Family History   Problem Relation Age of Onset   • Hypertension Mother    • Parkinsonism Mother    • Anxiety disorder Mother    • Dementia Mother    • Heart disease Father    • Diabetes Sister    • Liver disease Sister         fatty liver   • Parkinsonism Sister    • Dementia Sister        Social History     Socioeconomic History   • Marital status:    Tobacco Use   • Smoking status: Never Smoker   • Smokeless tobacco: Never Used   • Tobacco comment: caffeine - coffee 1-2 cups daily    Vaping Use   • Vaping Use: Never used   Substance and Sexual Activity   • Alcohol use: Not Currently     Comment: Hvy Hx ---QUIT @ 42 y/o   • Drug use: No   • Sexual activity: Defer       History of Present Illness     The following portions of the patient's history were reviewed and updated as appropriate: allergies, current medications, past family history, past medical history, past social history, past surgical history and problem list.    Review of Systems    Vitals:    11/19/21 0809   BP: 123/61   Pulse: 54   Resp: 16   Temp: 97.7 °F (36.5 °C)   SpO2: 99%       Objective   Physical Exam      Assessment/Plan   Diagnoses and all orders for this visit:    1. Hyperlipidemia, unspecified hyperlipidemia type (Primary)  -     Lipid Panel; Future    2. Hypogonadism in male  -     Testosterone 20.25 MG/1.25GM (1.62%) gel; Place 2 application on the skin as directed by provider Every Morning.  Dispense: 150 g; Refill: 3    Other orders  -     rizatriptan (MAXALT) 10 MG tablet; TAKE ONE TABLET BY MOUTH AS NEEDED FOR HEADACHE AND MAY REPEAT EVERY 2 HOURS IF NEEDED. MAX OF 3 TABLETS/DAY  Dispense: 9 tablet; Refill: 2  -     ondansetron ODT (Zofran ODT) 4 MG disintegrating tablet; Place 1 tablet on the tongue Every 8  (Eight) Hours As Needed for Nausea or Vomiting.  Dispense: 40 tablet; Refill: 0  -     atenolol (TENORMIN) 25 MG tablet; Take 1 tablet by mouth Every Night.  Dispense: 90 tablet; Refill: 1

## 2021-12-10 ENCOUNTER — CLINICAL SUPPORT (OUTPATIENT)
Dept: FAMILY MEDICINE CLINIC | Facility: CLINIC | Age: 66
End: 2021-12-10

## 2021-12-10 DIAGNOSIS — E78.5 HYPERLIPIDEMIA, UNSPECIFIED HYPERLIPIDEMIA TYPE: ICD-10-CM

## 2021-12-10 PROCEDURE — 36415 COLL VENOUS BLD VENIPUNCTURE: CPT | Performed by: FAMILY MEDICINE

## 2021-12-10 PROCEDURE — 80061 LIPID PANEL: CPT | Performed by: FAMILY MEDICINE

## 2021-12-10 NOTE — PROGRESS NOTES
Venipuncture performed in L arm by RT Andrew  with good hemostasis. Patient tolerated well. 12/10/21 Salima Ricketts, RT

## 2021-12-11 LAB
CHOLEST SERPL-MCNC: 157 MG/DL (ref 0–200)
HDLC SERPL-MCNC: 45 MG/DL (ref 40–60)
LDLC SERPL CALC-MCNC: 96 MG/DL (ref 0–100)
LDLC/HDLC SERPL: 2.12 {RATIO}
TRIGL SERPL-MCNC: 82 MG/DL (ref 0–150)
VLDLC SERPL-MCNC: 16 MG/DL (ref 5–40)

## 2021-12-13 RX ORDER — ATORVASTATIN CALCIUM 80 MG/1
80 TABLET, FILM COATED ORAL DAILY
Qty: 90 TABLET | Refills: 2 | Status: SHIPPED | OUTPATIENT
Start: 2021-12-13 | End: 2022-10-31 | Stop reason: SDUPTHER

## 2022-01-04 RX ORDER — RIZATRIPTAN BENZOATE 10 MG/1
TABLET ORAL
Qty: 9 TABLET | Refills: 2 | Status: SHIPPED | OUTPATIENT
Start: 2022-01-04 | End: 2022-09-13

## 2022-01-04 NOTE — TELEPHONE ENCOUNTER
Rx Refill Note  Requested Prescriptions     Pending Prescriptions Disp Refills   • rizatriptan (MAXALT) 10 MG tablet [Pharmacy Med Name: RIZATRIPTAN 10 MG TABLET] 9 tablet 2     Sig: TAKE ONE TABLET BY MOUTH AS NEEDED FOR HEADACHE AND MAY REPEAT EVERY 2 HOURS IF NEEDED. MAX OF 3 TABLETS/PER DAY      Last office visit with prescribing clinician: 11/19/2021      Next office visit with prescribing clinician: Visit date not found     Lipid Panel (12/10/2021 08:41)         Salima Ricketts, RT  01/04/22, 09:43 EST

## 2022-02-17 ENCOUNTER — TELEPHONE (OUTPATIENT)
Dept: FAMILY MEDICINE CLINIC | Facility: CLINIC | Age: 67
End: 2022-02-17

## 2022-02-17 DIAGNOSIS — E29.1 HYPOGONADISM MALE: Primary | ICD-10-CM

## 2022-02-18 ENCOUNTER — LAB (OUTPATIENT)
Dept: FAMILY MEDICINE CLINIC | Facility: CLINIC | Age: 67
End: 2022-02-18

## 2022-02-18 DIAGNOSIS — E29.1 HYPOGONADISM MALE: ICD-10-CM

## 2022-02-18 PROCEDURE — 85025 COMPLETE CBC W/AUTO DIFF WBC: CPT | Performed by: FAMILY MEDICINE

## 2022-02-18 PROCEDURE — 84402 ASSAY OF FREE TESTOSTERONE: CPT | Performed by: FAMILY MEDICINE

## 2022-02-18 PROCEDURE — 36415 COLL VENOUS BLD VENIPUNCTURE: CPT | Performed by: FAMILY MEDICINE

## 2022-02-18 PROCEDURE — 84403 ASSAY OF TOTAL TESTOSTERONE: CPT | Performed by: FAMILY MEDICINE

## 2022-02-19 LAB
BASOPHILS # BLD AUTO: 0.05 10*3/MM3 (ref 0–0.2)
BASOPHILS NFR BLD AUTO: 0.7 % (ref 0–1.5)
DEPRECATED RDW RBC AUTO: 36.8 FL (ref 37–54)
EOSINOPHIL # BLD AUTO: 0.23 10*3/MM3 (ref 0–0.4)
EOSINOPHIL NFR BLD AUTO: 3.4 % (ref 0.3–6.2)
ERYTHROCYTE [DISTWIDTH] IN BLOOD BY AUTOMATED COUNT: 12.1 % (ref 12.3–15.4)
HCT VFR BLD AUTO: 43.6 % (ref 37.5–51)
HGB BLD-MCNC: 14.8 G/DL (ref 13–17.7)
IMM GRANULOCYTES # BLD AUTO: 0.02 10*3/MM3 (ref 0–0.05)
IMM GRANULOCYTES NFR BLD AUTO: 0.3 % (ref 0–0.5)
LYMPHOCYTES # BLD AUTO: 2.74 10*3/MM3 (ref 0.7–3.1)
LYMPHOCYTES NFR BLD AUTO: 40.1 % (ref 19.6–45.3)
MCH RBC QN AUTO: 28.8 PG (ref 26.6–33)
MCHC RBC AUTO-ENTMCNC: 33.9 G/DL (ref 31.5–35.7)
MCV RBC AUTO: 85 FL (ref 79–97)
MONOCYTES # BLD AUTO: 0.72 10*3/MM3 (ref 0.1–0.9)
MONOCYTES NFR BLD AUTO: 10.5 % (ref 5–12)
NEUTROPHILS NFR BLD AUTO: 3.08 10*3/MM3 (ref 1.7–7)
NEUTROPHILS NFR BLD AUTO: 45 % (ref 42.7–76)
NRBC BLD AUTO-RTO: 0 /100 WBC (ref 0–0.2)
PLATELET # BLD AUTO: 268 10*3/MM3 (ref 140–450)
PMV BLD AUTO: 11.2 FL (ref 6–12)
RBC # BLD AUTO: 5.13 10*6/MM3 (ref 4.14–5.8)
WBC NRBC COR # BLD: 6.84 10*3/MM3 (ref 3.4–10.8)

## 2022-02-23 LAB
TESTOST FREE SERPL-MCNC: 3.1 PG/ML (ref 6.6–18.1)
TESTOST SERPL-MCNC: 276 NG/DL (ref 264–916)

## 2022-02-28 RX ORDER — ONDANSETRON 4 MG/1
TABLET, ORALLY DISINTEGRATING ORAL
Qty: 40 TABLET | Refills: 0 | Status: SHIPPED | OUTPATIENT
Start: 2022-02-28 | End: 2022-11-16 | Stop reason: SDUPTHER

## 2022-02-28 NOTE — TELEPHONE ENCOUNTER
Rx Refill Note  Requested Prescriptions     Pending Prescriptions Disp Refills   • ondansetron ODT (ZOFRAN-ODT) 4 MG disintegrating tablet [Pharmacy Med Name: ONDANSETRON ODT 4 MG TABLET] 40 tablet 0     Sig: DISSOLVE ONE TABLET BY MOUTH EVERY 8 HOURS AS NEEDED FOR NAUSEA AND VOMITING      Last office visit with prescribing clinician: 11/19/2021      Next office visit with prescribing clinician: Visit date not found     Lipid Panel (12/10/2021 08:41)  CBC & Differential (10/29/2021 08:36)         Citlaly Lazo CMA  02/28/22, 10:24 EST

## 2022-03-02 NOTE — TELEPHONE ENCOUNTER
Needs labs   · Blood pressures in the office slightly 140/80   · Continue same medications  · Blood work reviewed-acceptable      · Repeat BMP in 3 months

## 2022-05-17 DIAGNOSIS — E29.1 HYPOGONADISM IN MALE: ICD-10-CM

## 2022-05-17 NOTE — TELEPHONE ENCOUNTER
Rx Refill Note  Requested Prescriptions     Pending Prescriptions Disp Refills   • Testosterone 20.25 MG/ACT (1.62%) gel [Pharmacy Med Name: TESTOSTERONE 1.62% GEL PUMP] 150 g      Sig: APPLY 2 PUMP ACTUATIONS (40.5 MG/2.5 GM) TOPICALLY ONCE DAILY IN THE MORNING TO EACH SHOULDER      Last office visit with prescribing clinician: 11/19/2021      Next office visit with prescribing clinician: Visit date not found     Testosterone, free, total (02/18/2022 08:17)         Salima Ricketts, RT  05/17/22, 16:18 EDT

## 2022-05-18 RX ORDER — TESTOSTERONE 16.2 MG/G
GEL TRANSDERMAL
Qty: 150 G | Refills: 1 | Status: SHIPPED | OUTPATIENT
Start: 2022-05-18 | End: 2022-07-26

## 2022-05-18 NOTE — TELEPHONE ENCOUNTER
PATIENT CALLED TO UPDATE:    THE PATIENT IS CURRENTLY TAKING AS PRESCRIBED - 2 PUMPS IN EACH ARM PER DAY - Testosterone 20.25 MG/1.25GM (1.62%) gel    THE PATIENT DID NOTE THAT HE HAS BEEN BETTER ABOUT DOING IT, AND NOT MISSING MANY DOSES, BUT DOES MISS OCCASIONALLY.    CALL BACK IF NEEDED:  [phone]

## 2022-05-18 NOTE — TELEPHONE ENCOUNTER
HUB to share    Testosterone levels low in Feb -----is he still taking it??? How much?    LVM asking pt

## 2022-06-08 NOTE — TELEPHONE ENCOUNTER
Due for fasting lipids/ cmp   Mom states child has had frequent diarrhea for past few days, approx 4 episodes per day. No fever, child is active, taking fluids well. No one else at home sick. Having wet diapers. Appetite has been good. Advised pushing fluids, BRAT diet, no dairy. If worsens or doesn't improve then to follow up. Dr. Brissa Blake please advise if other recommendations.

## 2022-07-11 RX ORDER — ATENOLOL 25 MG/1
TABLET ORAL
Qty: 90 TABLET | Refills: 0 | Status: SHIPPED | OUTPATIENT
Start: 2022-07-11 | End: 2022-10-27

## 2022-07-11 NOTE — TELEPHONE ENCOUNTER
Rx Refill Note  Requested Prescriptions     Pending Prescriptions Disp Refills   • atenolol (TENORMIN) 25 MG tablet [Pharmacy Med Name: ATENOLOL 25 MG TABLET] 90 tablet 1     Sig: TAKE ONE TABLET BY MOUTH ONCE NIGHTLY      Last office visit with prescribing clinician: 11/19/2021      Next office visit with prescribing clinician: Visit date not found     Lipid Panel (12/10/2021 08:41)         Salima Ricketts, RT  07/11/22, 09:43 EDT

## 2022-07-24 DIAGNOSIS — E29.1 HYPOGONADISM IN MALE: ICD-10-CM

## 2022-07-25 DIAGNOSIS — N42.9 DISORDER OF PROSTATE, UNSPECIFIED: ICD-10-CM

## 2022-07-25 DIAGNOSIS — E29.1 HYPOGONADISM IN MALE: Primary | ICD-10-CM

## 2022-07-25 DIAGNOSIS — Z79.890 HORMONE REPLACEMENT THERAPY: ICD-10-CM

## 2022-07-25 NOTE — TELEPHONE ENCOUNTER
Rx Refill Note  Requested Prescriptions     Pending Prescriptions Disp Refills   • Testosterone 20.25 MG/ACT (1.62%) gel [Pharmacy Med Name: TESTOSTERONE 1.62% GEL PUMP] 150 g      Sig: USE 2 PUMPS ONCE A DAY IN THE MORNING TO EACH SHOULDER      Last office visit with prescribing clinician: 11/19/2021      Next office visit with prescribing clinician: Visit date not found     Testosterone, free, total (02/18/2022 08:17)         Salima Ricketts, RT  07/25/22, 08:41 EDT

## 2022-07-26 ENCOUNTER — CLINICAL SUPPORT (OUTPATIENT)
Dept: FAMILY MEDICINE CLINIC | Facility: CLINIC | Age: 67
End: 2022-07-26

## 2022-07-26 DIAGNOSIS — Z79.890 HORMONE REPLACEMENT THERAPY: ICD-10-CM

## 2022-07-26 DIAGNOSIS — E29.1 HYPOGONADISM IN MALE: ICD-10-CM

## 2022-07-26 DIAGNOSIS — N42.9 DISORDER OF PROSTATE, UNSPECIFIED: ICD-10-CM

## 2022-07-26 PROCEDURE — 84403 ASSAY OF TOTAL TESTOSTERONE: CPT | Performed by: FAMILY MEDICINE

## 2022-07-26 PROCEDURE — 36415 COLL VENOUS BLD VENIPUNCTURE: CPT | Performed by: FAMILY MEDICINE

## 2022-07-26 PROCEDURE — 84153 ASSAY OF PSA TOTAL: CPT | Performed by: FAMILY MEDICINE

## 2022-07-26 PROCEDURE — 84402 ASSAY OF FREE TESTOSTERONE: CPT | Performed by: FAMILY MEDICINE

## 2022-07-26 RX ORDER — TESTOSTERONE 16.2 MG/G
GEL TRANSDERMAL
Qty: 150 G | Refills: 1 | Status: SHIPPED | OUTPATIENT
Start: 2022-07-26 | End: 2022-09-28

## 2022-07-27 LAB — PSA SERPL-MCNC: 2 NG/ML (ref 0–4)

## 2022-07-30 LAB
TESTOST FREE SERPL-MCNC: 7.3 PG/ML (ref 6.6–18.1)
TESTOST SERPL-MCNC: 658.4 NG/DL (ref 264–916)

## 2022-09-13 RX ORDER — RIZATRIPTAN BENZOATE 10 MG/1
TABLET ORAL
Qty: 9 TABLET | Refills: 2 | Status: SHIPPED | OUTPATIENT
Start: 2022-09-13 | End: 2022-12-28

## 2022-09-19 ENCOUNTER — TELEPHONE (OUTPATIENT)
Dept: FAMILY MEDICINE CLINIC | Facility: CLINIC | Age: 67
End: 2022-09-19

## 2022-09-19 NOTE — TELEPHONE ENCOUNTER
Caller: Toy Shannon    Relationship: Self    Best call back number: [phone]     What medication are you requesting: TRAZODONE 100 MG   If a prescription is needed, what is your preferred pharmacy and phone number: [facility] [address] - [phone]  - [phone] FX     Additional notes: WANTING TO KNOW IF DR. HUNTER WILL TAKE OVER PRESCRIPTION THIS PRESCRIPTION FOR HIM PLEASE CALL AND ADVISE

## 2022-09-20 NOTE — TELEPHONE ENCOUNTER
Patient scheduled appt for 10/14/2022.  Patient will contact provider who originally prescribed medication and request a refill from them until he sees Dr. Quezada in October.

## 2022-09-28 DIAGNOSIS — E29.1 HYPOGONADISM IN MALE: ICD-10-CM

## 2022-09-28 RX ORDER — TESTOSTERONE 16.2 MG/G
GEL TRANSDERMAL
Qty: 150 G | Refills: 0 | Status: SHIPPED | OUTPATIENT
Start: 2022-09-28 | End: 2022-10-27

## 2022-09-28 NOTE — TELEPHONE ENCOUNTER
Rx Refill Note  Requested Prescriptions     Pending Prescriptions Disp Refills   • Testosterone 20.25 MG/ACT (1.62%) gel [Pharmacy Med Name: TESTOSTERONE 1.62% GEL PUMP] 150 g      Sig: APPLY TWO PUMP TO THE SKIN EVERY MORNING TO EACH SHOULDER      Last office visit with prescribing clinician: 11/19/2021      Next office visit with prescribing clinician: 10/21/2022     Testosterone (Free & Total), LC / MS (07/26/2022 12:00)         Salima Ricketts, RT  09/28/22, 13:01 EDT

## 2022-10-27 DIAGNOSIS — Z12.5 SCREENING FOR PROSTATE CANCER: ICD-10-CM

## 2022-10-27 DIAGNOSIS — E29.1 HYPOGONADISM IN MALE: ICD-10-CM

## 2022-10-27 DIAGNOSIS — E29.1 HYPOGONADISM IN MALE: Primary | ICD-10-CM

## 2022-10-27 DIAGNOSIS — I10 ESSENTIAL HYPERTENSION: ICD-10-CM

## 2022-10-27 DIAGNOSIS — E78.5 HYPERLIPIDEMIA, UNSPECIFIED HYPERLIPIDEMIA TYPE: ICD-10-CM

## 2022-10-27 RX ORDER — TESTOSTERONE 16.2 MG/G
GEL TRANSDERMAL
Qty: 150 G | Refills: 0 | Status: SHIPPED | OUTPATIENT
Start: 2022-10-27 | End: 2022-11-16

## 2022-10-27 RX ORDER — ATENOLOL 25 MG/1
TABLET ORAL
Qty: 90 TABLET | Refills: 0 | Status: SHIPPED | OUTPATIENT
Start: 2022-10-27 | End: 2022-11-16

## 2022-10-27 NOTE — TELEPHONE ENCOUNTER
Rx Refill Note  Requested Prescriptions     Pending Prescriptions Disp Refills   • atenolol (TENORMIN) 25 MG tablet [Pharmacy Med Name: ATENOLOL 25 MG TABLET] 90 tablet 0     Sig: TAKE ONE TABLET BY MOUTH ONCE NIGHTLY      Last office visit with prescribing clinician: 11/19/2021      Next office visit with prescribing clinician: 1/13/2023     CBC and Differential (02/18/2022 08:17)  Lipid Panel (12/10/2021 08:41)  Lipid Panel (04/16/2021 08:32)         Citlaly Lazo CMA  10/27/22, 11:50 EDT

## 2022-10-28 RX ORDER — ATORVASTATIN CALCIUM 80 MG/1
80 TABLET, FILM COATED ORAL DAILY
Qty: 90 TABLET | Refills: 2 | OUTPATIENT
Start: 2022-10-28

## 2022-10-31 RX ORDER — ATORVASTATIN CALCIUM 80 MG/1
80 TABLET, FILM COATED ORAL DAILY
Qty: 30 TABLET | Refills: 0 | Status: SHIPPED | OUTPATIENT
Start: 2022-10-31

## 2022-11-16 ENCOUNTER — OFFICE VISIT (OUTPATIENT)
Dept: FAMILY MEDICINE CLINIC | Facility: CLINIC | Age: 67
End: 2022-11-16

## 2022-11-16 VITALS
HEIGHT: 71 IN | WEIGHT: 258 LBS | SYSTOLIC BLOOD PRESSURE: 136 MMHG | HEART RATE: 57 BPM | DIASTOLIC BLOOD PRESSURE: 73 MMHG | BODY MASS INDEX: 36.12 KG/M2 | RESPIRATION RATE: 14 BRPM | OXYGEN SATURATION: 98 % | TEMPERATURE: 98.2 F

## 2022-11-16 DIAGNOSIS — I10 ELEVATED BLOOD PRESSURE READING WITH DIAGNOSIS OF HYPERTENSION: Primary | ICD-10-CM

## 2022-11-16 DIAGNOSIS — E78.00 HYPERCHOLESTEREMIA: Chronic | ICD-10-CM

## 2022-11-16 DIAGNOSIS — R63.5 WEIGHT GAIN FINDING: ICD-10-CM

## 2022-11-16 DIAGNOSIS — G44.039 EPISODIC PAROXYSMAL HEMICRANIA, NOT INTRACTABLE: Chronic | ICD-10-CM

## 2022-11-16 DIAGNOSIS — E29.1 HYPOGONADISM IN MALE: ICD-10-CM

## 2022-11-16 DIAGNOSIS — Z12.11 COLON CANCER SCREENING: ICD-10-CM

## 2022-11-16 PROCEDURE — 99214 OFFICE O/P EST MOD 30 MIN: CPT | Performed by: FAMILY MEDICINE

## 2022-11-16 RX ORDER — ONDANSETRON 4 MG/1
4 TABLET, ORALLY DISINTEGRATING ORAL EVERY 8 HOURS PRN
Qty: 40 TABLET | Refills: 0 | Status: CANCELLED | OUTPATIENT
Start: 2022-11-16

## 2022-11-16 RX ORDER — ATENOLOL 25 MG/1
25 TABLET ORAL DAILY
Qty: 90 TABLET | Refills: 0
Start: 2022-11-16

## 2022-11-16 RX ORDER — ALPRAZOLAM 0.5 MG/1
0.5 TABLET ORAL 4 TIMES DAILY PRN
Status: SHIPPED
Start: 2022-11-16

## 2022-11-16 RX ORDER — ONDANSETRON 4 MG/1
4 TABLET, ORALLY DISINTEGRATING ORAL EVERY 8 HOURS PRN
Qty: 40 TABLET | Refills: 0 | Status: SHIPPED | OUTPATIENT
Start: 2022-11-16

## 2022-11-16 RX ORDER — TESTOSTERONE 16.2 MG/G
GEL TRANSDERMAL
Qty: 150 G | Refills: 0 | Status: SHIPPED
Start: 2022-11-16 | End: 2022-12-14

## 2022-11-16 NOTE — PROGRESS NOTES
Answers for HPI/ROS submitted by the patient on 11/15/2022  What is the primary reason for your visit?: Other  Please describe your symptoms.: This is a routine check in to determine what my on-going mediation needs are for the purpose of refills, etc.  Have you had these symptoms before?: Yes  How long have you been having these symptoms?: Greater than 2 weeks  Please list any medications you are currently taking for this condition.: Atenolol, Atorvastatin, Testosterone gel.  Please describe any probable cause for these symptoms. : Cholesterol control, blood pressure control, low testosterone.    Subjective   Toy Shannon is a 67 y.o. male.     Chief Complaint   Patient presents with   • Hyperlipidemia   • Hypertension   • Headache   • Hypogonadism         Current Outpatient Medications:   •  ALPRAZolam (XANAX) 0.5 MG tablet, Take 1 tablet by mouth 4 (Four) Times a Day As Needed., Disp: , Rfl:   •  aspirin 81 MG chewable tablet, Chew 81 mg Daily., Disp: , Rfl:   •  atenolol (TENORMIN) 25 MG tablet, Take 1 tablet by mouth Daily., Disp: 90 tablet, Rfl: 0  •  atorvastatin (LIPITOR) 80 MG tablet, Take 1 tablet by mouth Daily., Disp: 30 tablet, Rfl: 0  •  coenzyme Q10 100 MG capsule, Take 100 mg by mouth Daily., Disp: , Rfl:   •  Multiple Vitamins-Minerals (MULTIVITAMIN ADULTS PO), Take  by mouth Daily., Disp: , Rfl:   •  Omega-3 Fatty Acids (FISH OIL) 1200 MG capsule capsule, Take 1,200 mg by mouth Daily., Disp: , Rfl:   •  ondansetron ODT (ZOFRAN-ODT) 4 MG disintegrating tablet, Place 1 tablet on the tongue Every 8 (Eight) Hours As Needed for Nausea or Vomiting., Disp: 40 tablet, Rfl: 0  •  rizatriptan (MAXALT) 10 MG tablet, TAKE ONE TABLET BY MOUTH AT ONSET OF HEADACHE; MAY REPEAT ONE TABLET IN 2 HOURS IF NEEDED., Disp: 9 tablet, Rfl: 2  •  Testosterone 20.25 MG/ACT (1.62%) gel, APPLY 2 PUMPS ON SKIN EVERY MORNING TO EACH SHOULDER, Disp: 150 g, Rfl: 0  •  traZODone (DESYREL) 100 MG tablet, Take 100 mg by mouth  every night at bedtime., Disp: , Rfl:   •  Triamcinolone Acetonide (NASACORT) 55 MCG/ACT nasal inhaler, 2 sprays into the nostril(s) as directed by provider As Needed for Rhinitis., Disp: , Rfl:   •  Turmeric Curcumin 500 MG capsule, Take 1 capsule by mouth Daily., Disp: , Rfl:     Past Medical History:   Diagnosis Date   • Anxiety    • ASCVD    • CAD    • CHOL    • Headache    • Hypertension    • Hypogonadism    • RADHA/ CPAP     ------Dr. Garcia   • Panic attack     [facility]---Dr Munguia   • PSA     = 1.07/ =1.39/ =1.39/ = 2.00       Past Surgical History:   Procedure Laterality Date   • CARDIAC CATHETERIZATION         • COLONOSCOPY      COLOGUARD---NEG = , rech    • CORONARY ARTERY BYPASS GRAFT  2011    X 2    Dr. Ayers ([facility])   • VASECTOMY         Family History   Problem Relation Age of Onset   • Hypertension Mother         Responded to medication, lived to be 91.   • Parkinsonism Mother    • Anxiety disorder Mother    • Dementia Mother    • Heart disease Father            • Parkinsonism Sister    • Dementia Sister    • Diabetes Sister            • Liver disease Sister         fatty liver   • No Known Problems Brother        Social History     Socioeconomic History   • Marital status:    Tobacco Use   • Smoking status: Never   • Smokeless tobacco: Never   • Tobacco comments:     Smoked sporadically during teen years, none since.   Vaping Use   • Vaping Use: Never used   Substance and Sexual Activity   • Alcohol use: Not Currently     Comment: Abused alcohol until age 41 - stopped .   • Drug use: Not Currently     Types: Marijuana     Comment: Marijuana use early 20s.   • Sexual activity: Yes     Partners: Female     Birth control/protection: Surgical     Comment: Vasectomy       History of Present Illness  68 y/o C male here for f/u on HTN/ Hypogonadism/ CHOL/ Migraine/ Anx/ CAD      Home BP =128/70's w/ home cuff------it is new  "but not been checked for accuracy  Pt doing fine on current meds/ doses  Pt not wanting any vaccines for age      [facility] since he has gained 30# in last yr  Pt feels the HRT is helpful and using his CPAP qnite  Pt does have new cardio appt but not until 2/ 2023  Pt using the maxalt w/ good success but not having too many HA's   [facility] and his xanax---pt is trying to wean himself off of this though       The following portions of the patient's history were reviewed and updated as appropriate: allergies, current medications, past family history, past medical history, past social history, past surgical history and problem list.    Review of Systems   Constitutional: Positive for appetite change and unexpected weight gain. Negative for activity change, fatigue and unexpected weight loss.   Eyes: Negative for visual disturbance.   Respiratory: Negative for cough, chest tightness and shortness of breath.    Cardiovascular: Negative for chest pain, palpitations and leg swelling.   Gastrointestinal: Negative for blood in stool, constipation, diarrhea and GERD.   Genitourinary: Negative for decreased libido, frequency, erectile dysfunction, urgency and urinary incontinence.   Musculoskeletal: Negative for arthralgias and myalgias.   Neurological: Negative for dizziness, facial asymmetry, speech difficulty, weakness, light-headedness, headache, memory problem and confusion.   Psychiatric/Behavioral: Negative for sleep disturbance. The patient is not nervous/anxious.        Vitals:    11/16/22 0805   BP: 136/73   Pulse: 57   Resp: 14   Temp: 98.2 °F (36.8 °C)   SpO2: 98%       Objective   Physical Exam  Vitals and nursing note reviewed.   Constitutional:       General: He is not in acute distress.     Appearance: He is well-developed and overweight. He is not ill-appearing or toxic-appearing.   HENT:      Head: Normocephalic and atraumatic. "   Neck:      Vascular: No carotid bruit.   Cardiovascular:      Rate and Rhythm: Normal rate and regular rhythm.      Heart sounds: Normal heart sounds. No murmur heard.  Pulmonary:      Effort: Pulmonary effort is normal. No respiratory distress.      Breath sounds: Normal breath sounds. No stridor. No wheezing, rhonchi or rales.   Musculoskeletal:      Cervical back: Normal range of motion and neck supple.   Skin:     General: Skin is warm and dry.      Findings: No rash.   Neurological:      Mental Status: He is alert and oriented to person, place, and time.      Cranial Nerves: No cranial nerve deficit.      Gait: Gait normal.   Psychiatric:         Mood and Affect: Mood normal.         Behavior: Behavior normal.         Thought Content: Thought content normal.         Judgment: Judgment normal.           Assessment & Plan   Diagnoses and all orders for this visit:    1. Elevated blood pressure reading with diagnosis of hypertension (Primary)    2. Hypogonadism in male  -     Testosterone 20.25 MG/ACT (1.62%) gel; APPLY 2 PUMPS ON SKIN EVERY MORNING TO EACH SHOULDER  Dispense: 150 g; Refill: 0    3. Episodic paroxysmal hemicrania, not intractable    4. Hypercholesteremia    5. Colon cancer screening  -     Cologuard - Stool, Per Rectum; Future    6. Weight gain finding    Other orders  -     ALPRAZolam (XANAX) 0.5 MG tablet; Take 1 tablet by mouth 4 (Four) Times a Day As Needed.  -     atenolol (TENORMIN) 25 MG tablet; Take 1 tablet by mouth Daily.  Dispense: 90 tablet; Refill: 0  -     Turmeric Curcumin 500 MG capsule; Take 1 capsule by mouth Daily.  -     ondansetron ODT (ZOFRAN-ODT) 4 MG disintegrating tablet; Place 1 tablet on the tongue Every 8 (Eight) Hours As Needed for Nausea or Vomiting.  Dispense: 40 tablet; Refill: 0    Pt to work on exercise 5 days/ week w/ healthy diet for wt loss  Rx---[facility]  Offered vaccines for age  Pt to get f/u labs before end of the yr and bring in new BP cuff for  check  Med refills for controlled conditions

## 2022-11-18 ENCOUNTER — CLINICAL SUPPORT (OUTPATIENT)
Dept: FAMILY MEDICINE CLINIC | Facility: CLINIC | Age: 67
End: 2022-11-18

## 2022-11-18 DIAGNOSIS — E29.1 HYPOGONADISM IN MALE: ICD-10-CM

## 2022-11-18 DIAGNOSIS — I10 ESSENTIAL HYPERTENSION: ICD-10-CM

## 2022-11-18 DIAGNOSIS — Z12.5 SCREENING FOR PROSTATE CANCER: ICD-10-CM

## 2022-11-18 DIAGNOSIS — E78.5 HYPERLIPIDEMIA, UNSPECIFIED HYPERLIPIDEMIA TYPE: ICD-10-CM

## 2022-11-18 PROCEDURE — 36415 COLL VENOUS BLD VENIPUNCTURE: CPT | Performed by: FAMILY MEDICINE

## 2022-11-18 PROCEDURE — G0103 PSA SCREENING: HCPCS | Performed by: FAMILY MEDICINE

## 2022-11-18 PROCEDURE — 84402 ASSAY OF FREE TESTOSTERONE: CPT | Performed by: FAMILY MEDICINE

## 2022-11-18 PROCEDURE — 80053 COMPREHEN METABOLIC PANEL: CPT | Performed by: FAMILY MEDICINE

## 2022-11-18 PROCEDURE — 84403 ASSAY OF TOTAL TESTOSTERONE: CPT | Performed by: FAMILY MEDICINE

## 2022-11-18 PROCEDURE — 80061 LIPID PANEL: CPT | Performed by: FAMILY MEDICINE

## 2022-11-18 NOTE — PROGRESS NOTES
Venipuncture performed in left arm by Citlaly Valenzuela CMA  with good hemostasis. Patient tolerated well. 11/18/22 Citlaly Valenzuela CMA

## 2022-11-19 LAB
ALBUMIN SERPL-MCNC: 4 G/DL (ref 3.5–5.2)
ALBUMIN/GLOB SERPL: 1.3 G/DL
ALP SERPL-CCNC: 64 U/L (ref 39–117)
ALT SERPL W P-5'-P-CCNC: 33 U/L (ref 1–41)
ANION GAP SERPL CALCULATED.3IONS-SCNC: 8.3 MMOL/L (ref 5–15)
AST SERPL-CCNC: 27 U/L (ref 1–40)
BILIRUB SERPL-MCNC: 0.4 MG/DL (ref 0–1.2)
BUN SERPL-MCNC: 14 MG/DL (ref 8–23)
BUN/CREAT SERPL: 12.7 (ref 7–25)
CALCIUM SPEC-SCNC: 9.6 MG/DL (ref 8.6–10.5)
CHLORIDE SERPL-SCNC: 101 MMOL/L (ref 98–107)
CHOLEST SERPL-MCNC: 152 MG/DL (ref 0–200)
CO2 SERPL-SCNC: 26.7 MMOL/L (ref 22–29)
CREAT SERPL-MCNC: 1.1 MG/DL (ref 0.76–1.27)
EGFRCR SERPLBLD CKD-EPI 2021: 72.7 ML/MIN/1.73
GLOBULIN UR ELPH-MCNC: 3.1 GM/DL
GLUCOSE SERPL-MCNC: 93 MG/DL (ref 65–99)
HDLC SERPL-MCNC: 47 MG/DL (ref 40–60)
LDLC SERPL CALC-MCNC: 91 MG/DL (ref 0–100)
LDLC/HDLC SERPL: 1.93 {RATIO}
POTASSIUM SERPL-SCNC: 4.8 MMOL/L (ref 3.5–5.2)
PROT SERPL-MCNC: 7.1 G/DL (ref 6–8.5)
PSA SERPL-MCNC: 1.18 NG/ML (ref 0–4)
SODIUM SERPL-SCNC: 136 MMOL/L (ref 136–145)
TRIGL SERPL-MCNC: 72 MG/DL (ref 0–150)
VLDLC SERPL-MCNC: 14 MG/DL (ref 5–40)

## 2022-11-21 ENCOUNTER — PATIENT MESSAGE (OUTPATIENT)
Dept: FAMILY MEDICINE CLINIC | Facility: CLINIC | Age: 67
End: 2022-11-21

## 2022-11-21 ENCOUNTER — TELEPHONE (OUTPATIENT)
Dept: FAMILY MEDICINE CLINIC | Facility: CLINIC | Age: 67
End: 2022-11-21

## 2022-11-21 NOTE — TELEPHONE ENCOUNTER
----- Message from Melina Quezada DO sent at 11/19/2022  5:02 PM EST -----  PSA/ LIPIDS/ CMP good

## 2022-11-24 LAB
TESTOST FREE SERPL-MCNC: 3.8 PG/ML (ref 6.6–18.1)
TESTOST SERPL-MCNC: 426.7 NG/DL (ref 264–916)

## 2022-11-30 DIAGNOSIS — E29.1 HYPOGONADISM IN MALE: ICD-10-CM

## 2022-11-30 RX ORDER — TESTOSTERONE 16.2 MG/G
GEL TRANSDERMAL
Qty: 150 G | OUTPATIENT
Start: 2022-11-30

## 2022-12-05 ENCOUNTER — TELEPHONE (OUTPATIENT)
Dept: FAMILY MEDICINE CLINIC | Facility: CLINIC | Age: 67
End: 2022-12-05

## 2022-12-05 NOTE — TELEPHONE ENCOUNTER
Caller: Toy Shannon    Relationship: Self    Best call back number: [phone]    What is the best time to reach you: ANY    Who are you requesting to speak with (clinical staff, provider,  specific staff member): CLINICAL STAFF    What was the call regarding: PATIENT IS REQUESTING TO DISCUSS WHAT NEXT STEPS ARE REGARDING HIS TESTOSTERONE LEVELS, WHETHER IT BE THE SHOT OR ANY OTHER OPTIONS.    Do you require a callback: YES

## 2022-12-08 NOTE — TELEPHONE ENCOUNTER
HUB to share    I would offer him to try q14day shot if he wants---can get them here or do his own after the first shot - Dr Quezada    LVM informing pt

## 2022-12-14 DIAGNOSIS — E29.1 HYPOGONADISM IN MALE: Primary | ICD-10-CM

## 2022-12-14 RX ORDER — TESTOSTERONE CYPIONATE 200 MG/ML
0.5 VIAL (ML) INTRAMUSCULAR
Qty: 1 ML | Refills: 2 | Status: SHIPPED | OUTPATIENT
Start: 2022-12-14 | End: 2022-12-21

## 2022-12-14 NOTE — TELEPHONE ENCOUNTER
Patient is here for the testosterone shot and he doesn't have the injections because the pharmacy does not have it. Please send this to the pharmacy

## 2022-12-15 NOTE — TELEPHONE ENCOUNTER
HUB to read     sent in the testosterone injection and patient said he will call back to make nurse appointment when he picks up the testosterone.

## 2022-12-21 ENCOUNTER — CLINICAL SUPPORT (OUTPATIENT)
Dept: FAMILY MEDICINE CLINIC | Facility: CLINIC | Age: 67
End: 2022-12-21

## 2022-12-21 DIAGNOSIS — E29.1 HYPOGONADISM IN MALE: Chronic | ICD-10-CM

## 2022-12-21 PROCEDURE — 96372 THER/PROPH/DIAG INJ SC/IM: CPT | Performed by: FAMILY MEDICINE

## 2022-12-21 RX ORDER — TESTOSTERONE CYPIONATE 200 MG/ML
200 INJECTION, SOLUTION INTRAMUSCULAR
Status: SHIPPED | OUTPATIENT
Start: 2022-12-21

## 2022-12-21 RX ADMIN — TESTOSTERONE CYPIONATE 100 MG: 200 INJECTION, SOLUTION INTRAMUSCULAR at 09:10

## 2022-12-28 RX ORDER — RIZATRIPTAN BENZOATE 10 MG/1
TABLET ORAL
Qty: 9 TABLET | Refills: 2 | Status: SHIPPED | OUTPATIENT
Start: 2022-12-28

## 2023-01-04 ENCOUNTER — CLINICAL SUPPORT (OUTPATIENT)
Dept: FAMILY MEDICINE CLINIC | Facility: CLINIC | Age: 68
End: 2023-01-04
Payer: MEDICARE

## 2023-01-04 DIAGNOSIS — E29.1 HYPOGONADISM IN MALE: Chronic | ICD-10-CM

## 2023-01-04 PROCEDURE — 96372 THER/PROPH/DIAG INJ SC/IM: CPT | Performed by: FAMILY MEDICINE

## 2023-01-04 RX ADMIN — TESTOSTERONE CYPIONATE 200 MG: 200 INJECTION, SOLUTION INTRAMUSCULAR at 13:52

## 2023-01-20 ENCOUNTER — CLINICAL SUPPORT (OUTPATIENT)
Dept: FAMILY MEDICINE CLINIC | Facility: CLINIC | Age: 68
End: 2023-01-20
Payer: MEDICARE

## 2023-01-20 DIAGNOSIS — E29.1 HYPOGONADISM IN MALE: Primary | Chronic | ICD-10-CM

## 2023-01-20 PROCEDURE — 96372 THER/PROPH/DIAG INJ SC/IM: CPT | Performed by: FAMILY MEDICINE

## 2023-01-20 RX ADMIN — TESTOSTERONE CYPIONATE 200 MG: 200 INJECTION, SOLUTION INTRAMUSCULAR at 09:09

## 2023-01-31 ENCOUNTER — TELEPHONE (OUTPATIENT)
Dept: FAMILY MEDICINE CLINIC | Facility: CLINIC | Age: 68
End: 2023-01-31
Payer: MEDICARE

## 2023-02-03 ENCOUNTER — CLINICAL SUPPORT (OUTPATIENT)
Dept: FAMILY MEDICINE CLINIC | Facility: CLINIC | Age: 68
End: 2023-02-03
Payer: MEDICARE

## 2023-02-03 DIAGNOSIS — E29.1 HYPOGONADISM IN MALE: Chronic | ICD-10-CM

## 2023-02-03 PROCEDURE — 96372 THER/PROPH/DIAG INJ SC/IM: CPT | Performed by: FAMILY MEDICINE

## 2023-02-03 RX ADMIN — TESTOSTERONE CYPIONATE 200 MG: 200 INJECTION, SOLUTION INTRAMUSCULAR at 09:13

## 2023-06-08 ENCOUNTER — TELEPHONE (OUTPATIENT)
Dept: FAMILY MEDICINE CLINIC | Facility: CLINIC | Age: 68
End: 2023-06-08
Payer: MEDICARE

## 2023-07-12 NOTE — PROGRESS NOTES
Venipuncture performed in L ARM by RT Andrew  with good hemostasis. Patient tolerated well. 07/26/22 Salima Ricketts, RT     
Satisfactory

## 2023-08-22 NOTE — PROGRESS NOTES
Subjective   Toy Shannon is a 64 y.o. male.  Fall from 2 weeks ago onto outstretched arms residual pain left shoulder he had pain upper arm and elbow pain is improved  History of Present Illness   Left shoulder pain status post fall no history recurrent shoulder issues.  Is getting a bit better with time    Review of Systems   All other systems reviewed and are negative.      Objective   Vitals:    06/25/19 0821   BP: 128/82   Pulse: 62   Temp: 98 °F (36.7 °C)   SpO2: 96%   Weight: 117 kg (257 lb 9.6 oz)     Physical Exam   Constitutional: He appears well-developed and well-nourished.   HENT:   Head: Normocephalic and atraumatic.   Cardiovascular: Normal rate, regular rhythm and normal heart sounds.   Pulmonary/Chest: Effort normal and breath sounds normal.   Musculoskeletal:   Left shoulder nontender palpation.  Normal range of motion left arm.  Able to abduct to 180 degrees.  Minima  Left arm nontender at the upper arm elbow wrist or forearm.   Nursing note and vitals reviewed.      No results found for: INR    Procedures  Left shoulder x-ray 2 views obtained.  No acute bony or soft tissue normalities are noted.  No comparison available.  Assessment/Plan     Left shoulder pain status post fall plan observation for now patient interested in a topical cream for which she is received before he is going to have the list of ingredients over the what he actually got sent from a source he is used before which [place].  I will state compounding pharmacy my impression await further information does give me an abbreviation list of some ingredients which would probably include gabapentin weight see what the actual readings are not before we move forward.    Much of this encounter note is an electronic transcription/translation of spoken language to printed text.  The electronic translation of spoken language may permit erroneous, or at times, nonsensical words or phrases to be inadvertently transcribed.  Although  I have reviewed the note for such errors, some may still exist. If there are questions or for further clarification, please contact me.          no known allergies

## 2025-03-01 ENCOUNTER — APPOINTMENT (OUTPATIENT)
Dept: GENERAL RADIOLOGY | Facility: HOSPITAL | Age: 70
DRG: 322 | End: 2025-03-01
Payer: MEDICARE

## 2025-03-01 ENCOUNTER — HOSPITAL ENCOUNTER (INPATIENT)
Facility: HOSPITAL | Age: 70
LOS: 2 days | Discharge: HOME | DRG: 322 | End: 2025-03-03
Attending: EMERGENCY MEDICINE | Admitting: INTERNAL MEDICINE
Payer: MEDICARE

## 2025-03-01 DIAGNOSIS — I21.19 ACUTE ST ELEVATION MYOCARDIAL INFARCTION (STEMI) OF INFERIOR WALL: ICD-10-CM

## 2025-03-01 DIAGNOSIS — I21.3 ST ELEVATION MYOCARDIAL INFARCTION (STEMI), UNSPECIFIED ARTERY: Primary | ICD-10-CM

## 2025-03-01 LAB
ACT BLD: 147 SECONDS (ref 89–137)
ACT BLD: 158 SECONDS (ref 89–137)
ACT BLD: 164 SECONDS (ref 89–137)
ACT BLD: 199 SECONDS (ref 89–137)
ACT BLD: 285 SECONDS (ref 89–137)
ACT BLD: 296 SECONDS (ref 89–137)
ALBUMIN SERPL-MCNC: 4.2 G/DL (ref 3.5–5.2)
ALBUMIN/GLOB SERPL: 1.6 G/DL
ALP SERPL-CCNC: 72 U/L (ref 39–117)
ALT SERPL W P-5'-P-CCNC: 33 U/L (ref 1–41)
ANION GAP SERPL CALCULATED.3IONS-SCNC: 10 MMOL/L (ref 5–15)
AST SERPL-CCNC: 75 U/L (ref 1–40)
BASOPHILS # BLD AUTO: 0.07 10*3/MM3 (ref 0–0.2)
BASOPHILS NFR BLD AUTO: 0.8 % (ref 0–1.5)
BILIRUB SERPL-MCNC: 0.4 MG/DL (ref 0–1.2)
BUN SERPL-MCNC: 6 MG/DL (ref 8–23)
BUN/CREAT SERPL: 6.8 (ref 7–25)
CALCIUM SPEC-SCNC: 9 MG/DL (ref 8.6–10.5)
CHLORIDE SERPL-SCNC: 100 MMOL/L (ref 98–107)
CO2 SERPL-SCNC: 25 MMOL/L (ref 22–29)
CREAT SERPL-MCNC: 0.88 MG/DL (ref 0.76–1.27)
DEPRECATED RDW RBC AUTO: 38.1 FL (ref 37–54)
EGFRCR SERPLBLD CKD-EPI 2021: 91.9 ML/MIN/1.73
EOSINOPHIL # BLD AUTO: 0.14 10*3/MM3 (ref 0–0.4)
EOSINOPHIL NFR BLD AUTO: 1.7 % (ref 0.3–6.2)
ERYTHROCYTE [DISTWIDTH] IN BLOOD BY AUTOMATED COUNT: 11.8 % (ref 12.3–15.4)
GLOBULIN UR ELPH-MCNC: 2.6 GM/DL
GLUCOSE SERPL-MCNC: 99 MG/DL (ref 65–99)
HBA1C MFR BLD: 5.57 % (ref 4.8–5.6)
HCT VFR BLD AUTO: 44.1 % (ref 37.5–51)
HGB BLD-MCNC: 14.9 G/DL (ref 13–17.7)
HOLD SPECIMEN: NORMAL
HOLD SPECIMEN: NORMAL
IMM GRANULOCYTES # BLD AUTO: 0.03 10*3/MM3 (ref 0–0.05)
IMM GRANULOCYTES NFR BLD AUTO: 0.4 % (ref 0–0.5)
INR PPP: 1.01 (ref 0.9–1.1)
LYMPHOCYTES # BLD AUTO: 2.43 10*3/MM3 (ref 0.7–3.1)
LYMPHOCYTES NFR BLD AUTO: 28.9 % (ref 19.6–45.3)
MAGNESIUM SERPL-MCNC: 1.9 MG/DL (ref 1.6–2.4)
MCH RBC QN AUTO: 29.5 PG (ref 26.6–33)
MCHC RBC AUTO-ENTMCNC: 33.8 G/DL (ref 31.5–35.7)
MCV RBC AUTO: 87.3 FL (ref 79–97)
MONOCYTES # BLD AUTO: 0.96 10*3/MM3 (ref 0.1–0.9)
MONOCYTES NFR BLD AUTO: 11.4 % (ref 5–12)
NEUTROPHILS NFR BLD AUTO: 4.79 10*3/MM3 (ref 1.7–7)
NEUTROPHILS NFR BLD AUTO: 56.8 % (ref 42.7–76)
NRBC BLD AUTO-RTO: 0 /100 WBC (ref 0–0.2)
PLATELET # BLD AUTO: 235 10*3/MM3 (ref 140–450)
PMV BLD AUTO: 10 FL (ref 6–12)
POTASSIUM SERPL-SCNC: 4.1 MMOL/L (ref 3.5–5.2)
PROT SERPL-MCNC: 6.8 G/DL (ref 6–8.5)
PROTHROMBIN TIME: 13.2 SECONDS (ref 11.7–14.2)
RBC # BLD AUTO: 5.05 10*6/MM3 (ref 4.14–5.8)
SODIUM SERPL-SCNC: 135 MMOL/L (ref 136–145)
TROPONIN T SERPL HS-MCNC: 285 NG/L
WBC NRBC COR # BLD AUTO: 8.42 10*3/MM3 (ref 3.4–10.8)
WHOLE BLOOD HOLD COAG: NORMAL
WHOLE BLOOD HOLD SPECIMEN: NORMAL

## 2025-03-01 PROCEDURE — 25010000002 HEPARIN (PORCINE) PER 1000 UNITS: Performed by: EMERGENCY MEDICINE

## 2025-03-01 PROCEDURE — C1769 GUIDE WIRE: HCPCS | Performed by: INTERNAL MEDICINE

## 2025-03-01 PROCEDURE — 83036 HEMOGLOBIN GLYCOSYLATED A1C: CPT | Performed by: INTERNAL MEDICINE

## 2025-03-01 PROCEDURE — 25010000002 LIDOCAINE 1 % SOLUTION: Performed by: INTERNAL MEDICINE

## 2025-03-01 PROCEDURE — B2151ZZ FLUOROSCOPY OF LEFT HEART USING LOW OSMOLAR CONTRAST: ICD-10-PCS | Performed by: INTERNAL MEDICINE

## 2025-03-01 PROCEDURE — C1725 CATH, TRANSLUMIN NON-LASER: HCPCS | Performed by: INTERNAL MEDICINE

## 2025-03-01 PROCEDURE — 25010000002 ONDANSETRON PER 1 MG: Performed by: EMERGENCY MEDICINE

## 2025-03-01 PROCEDURE — 25010000002 ATROPINE SULFATE 1 MG/10ML SOLUTION PREFILLED SYRINGE: Performed by: INTERNAL MEDICINE

## 2025-03-01 PROCEDURE — 80053 COMPREHEN METABOLIC PANEL: CPT | Performed by: EMERGENCY MEDICINE

## 2025-03-01 PROCEDURE — 25010000002 MIDAZOLAM PER 1 MG: Performed by: INTERNAL MEDICINE

## 2025-03-01 PROCEDURE — B2111ZZ FLUOROSCOPY OF MULTIPLE CORONARY ARTERIES USING LOW OSMOLAR CONTRAST: ICD-10-PCS | Performed by: INTERNAL MEDICINE

## 2025-03-01 PROCEDURE — 99291 CRITICAL CARE FIRST HOUR: CPT

## 2025-03-01 PROCEDURE — C1874 STENT, COATED/COV W/DEL SYS: HCPCS | Performed by: INTERNAL MEDICINE

## 2025-03-01 PROCEDURE — 027034Z DILATION OF CORONARY ARTERY, ONE ARTERY WITH DRUG-ELUTING INTRALUMINAL DEVICE, PERCUTANEOUS APPROACH: ICD-10-PCS | Performed by: INTERNAL MEDICINE

## 2025-03-01 PROCEDURE — 93459 L HRT ART/GRFT ANGIO: CPT | Performed by: INTERNAL MEDICINE

## 2025-03-01 PROCEDURE — 25010000002 HEPARIN (PORCINE) PER 1000 UNITS: Performed by: INTERNAL MEDICINE

## 2025-03-01 PROCEDURE — 93005 ELECTROCARDIOGRAM TRACING: CPT | Performed by: EMERGENCY MEDICINE

## 2025-03-01 PROCEDURE — 93010 ELECTROCARDIOGRAM REPORT: CPT | Performed by: STUDENT IN AN ORGANIZED HEALTH CARE EDUCATION/TRAINING PROGRAM

## 2025-03-01 PROCEDURE — 85025 COMPLETE CBC W/AUTO DIFF WBC: CPT | Performed by: EMERGENCY MEDICINE

## 2025-03-01 PROCEDURE — 93005 ELECTROCARDIOGRAM TRACING: CPT

## 2025-03-01 PROCEDURE — 83735 ASSAY OF MAGNESIUM: CPT | Performed by: EMERGENCY MEDICINE

## 2025-03-01 PROCEDURE — 71045 X-RAY EXAM CHEST 1 VIEW: CPT

## 2025-03-01 PROCEDURE — 36415 COLL VENOUS BLD VENIPUNCTURE: CPT

## 2025-03-01 PROCEDURE — 25010000002 PHENYLEPHRINE 10 MG/ML SOLUTION: Performed by: INTERNAL MEDICINE

## 2025-03-01 PROCEDURE — 85347 COAGULATION TIME ACTIVATED: CPT

## 2025-03-01 PROCEDURE — C1887 CATHETER, GUIDING: HCPCS | Performed by: INTERNAL MEDICINE

## 2025-03-01 PROCEDURE — 25010000002 FENTANYL CITRATE (PF) 100 MCG/2ML SOLUTION: Performed by: INTERNAL MEDICINE

## 2025-03-01 PROCEDURE — 84484 ASSAY OF TROPONIN QUANT: CPT | Performed by: EMERGENCY MEDICINE

## 2025-03-01 PROCEDURE — 25010000002 EPTIFIBATIDE 20 MG/10ML SOLUTION: Performed by: INTERNAL MEDICINE

## 2025-03-01 PROCEDURE — C9606 PERC D-E COR REVASC W AMI S: HCPCS | Performed by: INTERNAL MEDICINE

## 2025-03-01 PROCEDURE — 25510000001 IOPAMIDOL PER 1 ML: Performed by: INTERNAL MEDICINE

## 2025-03-01 PROCEDURE — C1894 INTRO/SHEATH, NON-LASER: HCPCS | Performed by: INTERNAL MEDICINE

## 2025-03-01 PROCEDURE — 63710000001 ONDANSETRON ODT 4 MG TABLET DISPERSIBLE: Performed by: INTERNAL MEDICINE

## 2025-03-01 PROCEDURE — 92941 PRQ TRLML REVSC TOT OCCL AMI: CPT | Performed by: INTERNAL MEDICINE

## 2025-03-01 PROCEDURE — 25010000002 NICARDIPINE 2.5 MG/ML SOLUTION: Performed by: INTERNAL MEDICINE

## 2025-03-01 PROCEDURE — 85610 PROTHROMBIN TIME: CPT | Performed by: EMERGENCY MEDICINE

## 2025-03-01 PROCEDURE — 99222 1ST HOSP IP/OBS MODERATE 55: CPT | Performed by: INTERNAL MEDICINE

## 2025-03-01 PROCEDURE — 25010000002 LORAZEPAM PER 2 MG: Performed by: EMERGENCY MEDICINE

## 2025-03-01 PROCEDURE — 4A023N7 MEASUREMENT OF CARDIAC SAMPLING AND PRESSURE, LEFT HEART, PERCUTANEOUS APPROACH: ICD-10-PCS | Performed by: INTERNAL MEDICINE

## 2025-03-01 DEVICE — XIENCE SKYPOINT™ EVEROLIMUS ELUTING CORONARY STENT SYSTEM 2.50 MM X 12 MM / RAPID-EXCHANGE
Type: IMPLANTABLE DEVICE | Site: CORONARY | Status: FUNCTIONAL
Brand: XIENCE SKYPOINT™

## 2025-03-01 DEVICE — XIENCE SKYPOINT™ EVEROLIMUS ELUTING CORONARY STENT SYSTEM 2.50 MM X 15 MM / RAPID-EXCHANGE
Type: IMPLANTABLE DEVICE | Site: CORONARY | Status: FUNCTIONAL
Brand: XIENCE SKYPOINT™

## 2025-03-01 RX ORDER — ASPIRIN 81 MG/1
81 TABLET, CHEWABLE ORAL DAILY
Status: DISCONTINUED | OUTPATIENT
Start: 2025-03-02 | End: 2025-03-03 | Stop reason: HOSPADM

## 2025-03-01 RX ORDER — ONDANSETRON 2 MG/ML
INJECTION INTRAMUSCULAR; INTRAVENOUS
Status: COMPLETED | OUTPATIENT
Start: 2025-03-01 | End: 2025-03-01

## 2025-03-01 RX ORDER — FENTANYL CITRATE 50 UG/ML
INJECTION, SOLUTION INTRAMUSCULAR; INTRAVENOUS
Status: DISCONTINUED | OUTPATIENT
Start: 2025-03-01 | End: 2025-03-01 | Stop reason: HOSPADM

## 2025-03-01 RX ORDER — HEPARIN SODIUM 1000 [USP'U]/ML
INJECTION, SOLUTION INTRAVENOUS; SUBCUTANEOUS
Status: ACTIVE
Start: 2025-03-01 | End: 2025-03-02

## 2025-03-01 RX ORDER — NITROGLYCERIN 0.4 MG/1
0.4 TABLET SUBLINGUAL
Status: DISCONTINUED | OUTPATIENT
Start: 2025-03-01 | End: 2025-03-03 | Stop reason: HOSPADM

## 2025-03-01 RX ORDER — IOPAMIDOL 755 MG/ML
INJECTION, SOLUTION INTRAVASCULAR
Status: DISCONTINUED | OUTPATIENT
Start: 2025-03-01 | End: 2025-03-01 | Stop reason: HOSPADM

## 2025-03-01 RX ORDER — ALUMINA, MAGNESIA, AND SIMETHICONE 2400; 2400; 240 MG/30ML; MG/30ML; MG/30ML
15 SUSPENSION ORAL EVERY 6 HOURS PRN
Status: DISCONTINUED | OUTPATIENT
Start: 2025-03-01 | End: 2025-03-03 | Stop reason: HOSPADM

## 2025-03-01 RX ORDER — LORAZEPAM 2 MG/ML
INJECTION INTRAMUSCULAR
Status: COMPLETED | OUTPATIENT
Start: 2025-03-01 | End: 2025-03-01

## 2025-03-01 RX ORDER — PHENYLEPHRINE HYDROCHLORIDE 10 MG/ML
INJECTION INTRAVENOUS
Status: DISCONTINUED | OUTPATIENT
Start: 2025-03-01 | End: 2025-03-01 | Stop reason: HOSPADM

## 2025-03-01 RX ORDER — ONDANSETRON 4 MG/1
4 TABLET, ORALLY DISINTEGRATING ORAL EVERY 6 HOURS PRN
Status: DISCONTINUED | OUTPATIENT
Start: 2025-03-01 | End: 2025-03-03 | Stop reason: HOSPADM

## 2025-03-01 RX ORDER — ONDANSETRON 2 MG/ML
4 INJECTION INTRAMUSCULAR; INTRAVENOUS EVERY 6 HOURS PRN
Status: DISCONTINUED | OUTPATIENT
Start: 2025-03-01 | End: 2025-03-03 | Stop reason: HOSPADM

## 2025-03-01 RX ORDER — HEPARIN SODIUM 1000 [USP'U]/ML
INJECTION, SOLUTION INTRAVENOUS; SUBCUTANEOUS
Status: DISCONTINUED | OUTPATIENT
Start: 2025-03-01 | End: 2025-03-01 | Stop reason: HOSPADM

## 2025-03-01 RX ORDER — ONDANSETRON 2 MG/ML
INJECTION INTRAMUSCULAR; INTRAVENOUS
Status: ACTIVE
Start: 2025-03-01 | End: 2025-03-02

## 2025-03-01 RX ORDER — TRAZODONE HYDROCHLORIDE 100 MG/1
100 TABLET ORAL NIGHTLY PRN
Status: DISCONTINUED | OUTPATIENT
Start: 2025-03-01 | End: 2025-03-03 | Stop reason: HOSPADM

## 2025-03-01 RX ORDER — NICARDIPINE HYDROCHLORIDE 2.5 MG/ML
INJECTION INTRAVENOUS
Status: DISCONTINUED | OUTPATIENT
Start: 2025-03-01 | End: 2025-03-01 | Stop reason: HOSPADM

## 2025-03-01 RX ORDER — MIDAZOLAM HYDROCHLORIDE 1 MG/ML
INJECTION, SOLUTION INTRAMUSCULAR; INTRAVENOUS
Status: DISCONTINUED | OUTPATIENT
Start: 2025-03-01 | End: 2025-03-01 | Stop reason: HOSPADM

## 2025-03-01 RX ORDER — ASPIRIN 81 MG/1
TABLET, CHEWABLE ORAL
Status: COMPLETED | OUTPATIENT
Start: 2025-03-01 | End: 2025-03-01

## 2025-03-01 RX ORDER — ATROPINE SULFATE 0.1 MG/ML
INJECTION INTRAVENOUS
Status: DISCONTINUED | OUTPATIENT
Start: 2025-03-01 | End: 2025-03-01 | Stop reason: HOSPADM

## 2025-03-01 RX ORDER — LIDOCAINE HYDROCHLORIDE 10 MG/ML
INJECTION, SOLUTION INFILTRATION; PERINEURAL
Status: DISCONTINUED | OUTPATIENT
Start: 2025-03-01 | End: 2025-03-01 | Stop reason: HOSPADM

## 2025-03-01 RX ORDER — ALPRAZOLAM 0.5 MG
0.5 TABLET ORAL 4 TIMES DAILY PRN
Status: DISCONTINUED | OUTPATIENT
Start: 2025-03-01 | End: 2025-03-03 | Stop reason: HOSPADM

## 2025-03-01 RX ORDER — LORAZEPAM 2 MG/ML
INJECTION INTRAMUSCULAR
Status: ACTIVE
Start: 2025-03-01 | End: 2025-03-02

## 2025-03-01 RX ORDER — SODIUM CHLORIDE 0.9 % (FLUSH) 0.9 %
10 SYRINGE (ML) INJECTION AS NEEDED
Status: DISCONTINUED | OUTPATIENT
Start: 2025-03-01 | End: 2025-03-03 | Stop reason: HOSPADM

## 2025-03-01 RX ORDER — EPTIFIBATIDE 2 MG/ML
INJECTION, SOLUTION INTRAVENOUS
Status: DISCONTINUED | OUTPATIENT
Start: 2025-03-01 | End: 2025-03-01 | Stop reason: HOSPADM

## 2025-03-01 RX ORDER — HEPARIN SODIUM 1000 [USP'U]/ML
INJECTION, SOLUTION INTRAVENOUS; SUBCUTANEOUS
Status: COMPLETED | OUTPATIENT
Start: 2025-03-01 | End: 2025-03-01

## 2025-03-01 RX ORDER — DIPHENHYDRAMINE HCL 25 MG
25 CAPSULE ORAL EVERY 6 HOURS PRN
Status: DISCONTINUED | OUTPATIENT
Start: 2025-03-01 | End: 2025-03-03 | Stop reason: HOSPADM

## 2025-03-01 RX ORDER — ACETAMINOPHEN 325 MG/1
650 TABLET ORAL EVERY 4 HOURS PRN
Status: DISCONTINUED | OUTPATIENT
Start: 2025-03-01 | End: 2025-03-03 | Stop reason: HOSPADM

## 2025-03-01 RX ORDER — ASPIRIN 81 MG/1
TABLET, CHEWABLE ORAL
Status: ACTIVE
Start: 2025-03-01 | End: 2025-03-02

## 2025-03-01 RX ORDER — ATORVASTATIN CALCIUM 40 MG/1
80 TABLET, FILM COATED ORAL DAILY
Status: DISCONTINUED | OUTPATIENT
Start: 2025-03-01 | End: 2025-03-03 | Stop reason: HOSPADM

## 2025-03-01 RX ORDER — NITROGLYCERIN 20 MG/100ML
INJECTION INTRAVENOUS
Status: ACTIVE
Start: 2025-03-01 | End: 2025-03-02

## 2025-03-01 RX ADMIN — TRAZODONE HYDROCHLORIDE 100 MG: 100 TABLET ORAL at 23:21

## 2025-03-01 RX ADMIN — ALPRAZOLAM 0.5 MG: 0.5 TABLET ORAL at 16:56

## 2025-03-01 RX ADMIN — ATORVASTATIN CALCIUM 80 MG: 40 TABLET, FILM COATED ORAL at 20:14

## 2025-03-01 RX ADMIN — LORAZEPAM 0.5 MG: 2 INJECTION, SOLUTION INTRAMUSCULAR; INTRAVENOUS at 12:35

## 2025-03-01 RX ADMIN — MUPIROCIN 1 APPLICATION: 20 OINTMENT TOPICAL at 23:21

## 2025-03-01 RX ADMIN — TICAGRELOR 90 MG: 90 TABLET ORAL at 20:14

## 2025-03-01 RX ADMIN — ALPRAZOLAM 0.5 MG: 0.5 TABLET ORAL at 23:24

## 2025-03-01 RX ADMIN — HEPARIN SODIUM 4000 UNITS: 1000 INJECTION, SOLUTION INTRAVENOUS; SUBCUTANEOUS at 12:17

## 2025-03-01 RX ADMIN — ACETAMINOPHEN 650 MG: 325 TABLET, FILM COATED ORAL at 18:53

## 2025-03-01 RX ADMIN — ASPIRIN 324 MG: 81 TABLET, CHEWABLE ORAL at 12:18

## 2025-03-01 RX ADMIN — ONDANSETRON 4 MG: 2 INJECTION INTRAMUSCULAR; INTRAVENOUS at 12:17

## 2025-03-01 RX ADMIN — ONDANSETRON 4 MG: 4 TABLET, ORALLY DISINTEGRATING ORAL at 21:03

## 2025-03-01 NOTE — H&P
History and Physical   Toy Shannon : 1955 MRN:[id number] LOS:0     Reason for admission: Acute inferior myocardial infarction     Assessment / Plan     Acute inferior STEMI  CAD  Hx of 2-v CABG  -S/p LHC w/ PTCA and stenting to SVG graft to PDA  -Check A1c  -On ASA & Brilinta  -TTE pending      HLD  -Resume high-dose atorvastatin  -Check lipid panel    Anxiety  -Continue prn Xanax        Level Of Support Discussed With: Patient  Code Status (Patient has no pulse and is not breathing): CPR (Attempt to Resuscitate)  Medical Interventions (Patient has pulse or is breathing): Full Support       Nutrition: Diet: Cardiac; Healthy Heart (2-3 Na+); Fluid Consistency: Thin (IDDSI 0)     DVT Prophylaxis: No Active Pharmacologic or Mechanical VTE Prophylaxis AND No VTE Prophylaxis Contraindications Documented   - Select Appropriate VTE Prophylaxis       History of Present illness     69-year-old male states he woke up with some pressure in his chest and a dull ache. He reports no radiating pain or diaphoresis or palpitation. He states he has had some mild associated nausea. States the pain has eased somewhat from peak of intensity. States he took an anxiolytic as well as some ibuprofen prior to arrival.    EKG in ER showed acute inferior STEMI.  Cardiology was consulted and pt was taken emergently to the cath lab where he underwent PTCA and stenting to SVG graft to PDA.  Pt admitted to CVU for observation.    Subjective / Review of systems     Review of Systems   Patient currently denies any cardiac symptoms whatsoever.  Specifically denies any chest pain, shortness of breath, feeling of racing heart, dizziness.  He does feel, stiff from having to lay flat until this sheath comes out.  Denies any nausea or vomiting.  He does state that he takes his anxiety medicine on a regular basis and would like to get that restarted.  Past Medical/Surgical/Social/Family History & Allergies     Past Medical History:   Diagnosis  Date    Anxiety     ASCVD     CAD     CHOL     Headache     Hypertension 2018    Hypogonadism     RADHA/ CPAP     ------Dr. Garcia    Panic attack     [facility]---Dr Munguia    PSA     = 1.07/ =1.39/ =1.39/ = 2.00      Past Surgical History:   Procedure Laterality Date    CARDIAC CATHETERIZATION          COLONOSCOPY      COLOGUARD---NEG = , rech     CORONARY ARTERY BYPASS GRAFT  2011    X 2    Dr. Ayers ([facility])    VASECTOMY        Social History     Socioeconomic History    Marital status:    Tobacco Use    Smoking status: Never    Smokeless tobacco: Never    Tobacco comments:     Smoked sporadically during teen years, none since.   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    Drug use: Not Currently     Types: Marijuana     Comment: Marijuana use early 20s.    Sexual activity: Yes     Partners: Female     Birth control/protection: Surgical     Comment: Vasectomy      Family History   Problem Relation Age of Onset    Hypertension Mother         Responded to medication, lived to be 91.    Parkinsonism Mother     Anxiety disorder Mother     Dementia Mother     Heart disease Father             Parkinsonism Sister     Dementia Sister     Diabetes Sister             Liver disease Sister         fatty liver    No Known Problems Brother       No Known Allergies     Home Medications     Prior to Admission medications    Medication Sig Start Date End Date Taking? Authorizing Provider   ALPRAZolam (XANAX) 0.5 MG tablet Take 1 tablet by mouth 4 (Four) Times a Day As Needed. 22   Melina Quezada DO   aspirin 81 MG chewable tablet Chew 81 mg Daily.    Provider, MD Jonah   atorvastatin (LIPITOR) 80 MG tablet Take 1 tablet by mouth Daily.  Patient taking differently: Take 20 mg by mouth Daily. 10/31/22   Melina Quezada DO    coenzyme Q10 100 MG capsule Take 100 mg by mouth Daily.    Jonah Cavanaugh MD   Multiple Vitamins-Minerals (MULTIVITAMIN ADULTS PO) Take  by mouth Daily.    Jonah Cavanaugh MD   Omega-3 Fatty Acids (FISH OIL) 1200 MG capsule capsule Take 1,200 mg by mouth Daily.    Jonah Cavanaugh MD   ondansetron ODT (ZOFRAN-ODT) 4 MG disintegrating tablet Place 1 tablet on the tongue Every 8 (Eight) Hours As Needed for Nausea or Vomiting. 11/16/22   Melina Quezada DO   rizatriptan (MAXALT) 10 MG tablet TAKE ONE TABLET BY MOUTH AT ONSET OF HEADACHE; MAY REPEAT ONE TABLET IN 2 HOURS IF NEEDED. 12/28/22   Melina Quezada DO   traZODone (DESYREL) 100 MG tablet Take 100 mg by mouth every night at bedtime. 9/21/19   Jonah Cavanaugh MD   Triamcinolone Acetonide (NASACORT) 55 MCG/ACT nasal inhaler 2 sprays into the nostril(s) as directed by provider As Needed for Rhinitis.    Jonah Cavanaugh MD   Turmeric Curcumin 500 MG capsule Take 1 capsule by mouth Daily. 11/16/22   Melina Quezada DO   atenolol (TENORMIN) 25 MG tablet Take 1 tablet by mouth Daily. 11/16/22 3/1/25  Melina Quezada DO      Objective / Physical Exam   Vital signs:  Temp: 97.4 °F (36.3 °C)  BP: 108/63  Heart Rate: 90  Resp: 18  SpO2: 96 %  Weight: 118 kg (261 lb 0.4 oz)    Admission Weight: Weight: 118 kg (261 lb 0.4 oz)    Physical Exam       GEN:  Pleasant, obese man.  Appears appropriate for stated age.  WD/WN/WH.  Lying in bed on RA.  NAD.  NEURO:  Brainstem reflexes intact.  No obvious focal deficit.  Moves all 4 ext.  HEENT:  N/AT.  PERRL.  MMM.  Oropharynx non-erythematous.  No drainage from the eyes/ears/nose.  No conjunctival petechiae.  No oral thrush.  Auditory and visual acuity grossly wnl.  Good dentition.  Voice normal.  NECK:  Supple, NT, trachea midline.  No meningismus.  No ROM limitation.  No torticollis.  No JVD.  No thyromegaly.    CHEST/LUNGS:  Breath sounds are clear and equal bilaterally.  No w/r/r.  Chest  excursion equal bilaterally.    CARDIOVASCULAR:  RRR w/o murmur noted.    GI:  Abdomen soft, NT, ND, +BS.  No HSM.  :  Normal external genitalia.  No Allen cath in place.  EXTREMITIES:  No deformity or amputation.  No cyanosis, edema, or asymmetry.  Pulses 2+ and equal in BLE's.    SKIN:  Warm, dry, and pink.  No rash, breakdown, or track marks noted.  LYMPHATICS/HEME:  No overt LAD or abnormal bruising.  No lymphedema.  MSK:  Normal ROM.  No joint abnormalities noted.  Strength is 5/5 and equal in BUE and BLE's.  PSYCH:  Pleasant.  A&Ox 3.  Normal mood and affect.  Responds appropriately to commands and appears to comprehend instructions.          Labs     Results from last 7 days   Lab Units 03/01/25  1216   WBC 10*3/mm3 8.42   HEMATOCRIT % 44.1   PLATELETS 10*3/mm3 235      Results from last 7 days   Lab Units 03/01/25  1216   SODIUM mmol/L 135*   POTASSIUM mmol/L 4.1   CHLORIDE mmol/L 100   CO2 mmol/L 25.0   BUN mg/dL 6*   CREATININE mg/dL 0.88        Imaging     Chest X ray: My independent assessment showed no infiltrates or effusions    EKG: My independent evaluation showed acute inferior STEMI.    Current Medications   Scheduled Meds:[START ON 3/2/2025] aspirin, 81 mg, Oral, Daily  atorvastatin, 80 mg, Oral, Daily  nitroglycerin, , ,   ticagrelor, 90 mg, Oral, BID         Continuous Infusions:      Benoit Weber DO  Critical Care  03/01/25   15:53 EST

## 2025-03-01 NOTE — PLAN OF CARE
Problem: Adult Inpatient Plan of Care  Goal: Absence of Hospital-Acquired Illness or Injury  Intervention: Identify and Manage Fall Risk  Recent Flowsheet Documentation  Taken 3/1/2025 1501 by Willie Torres, RN  Safety Promotion/Fall Prevention:   safety round/check completed   room organization consistent   fall prevention program maintained   clutter free environment maintained  Intervention: Prevent Skin Injury  Recent Flowsheet Documentation  Taken 3/1/2025 1501 by Willie Torres, RN  Body Position: position changed independently  Goal: Readiness for Transition of Care  Intervention: Mutually Develop Transition Plan  Recent Flowsheet Documentation  Taken 3/1/2025 1450 by Willie Torres, RN  Transportation Anticipated: family or friend will provide  Patient/Family Anticipated Services at Transition: none  Patient/Family Anticipates Transition to: home  Taken 3/1/2025 1449 by Willie Torres, RN  Equipment Currently Used at Home: none   Goal Outcome Evaluation:

## 2025-03-01 NOTE — Clinical Note
Conjuntivae and eyelids appear normal, Sclerae : White without injection The first saphenous vein graft was selectively engaged, injected and visualized.

## 2025-03-01 NOTE — Clinical Note
First balloon inflation max pressure = 9 venessa. First balloon inflation duration = 15 seconds. 65M with epistaxis from Gabe, now resolved. Will monitor for re-bleeding.

## 2025-03-01 NOTE — ED PROVIDER NOTES
Subjective   History of Present Illness  69-year-old male states he woke up with some pressure in his chest and a dull ache.  He reports no radiating pain or diaphoresis or palpitation.  He states he has had some mild associated nausea.  States the pain has eased somewhat from peak of intensity.  States he took an anxiolytic as well as some ibuprofen prior to arrival  Review of Systems    Past Medical History:   Diagnosis Date    Anxiety     ASCVD     CAD     CHOL     Headache     Hypertension 2018    Hypogonadism     RADHA/ CPAP     ------Dr. Garcia    Panic attack     [facility]---Dr Munguia    PSA     2019= 1.07/ =1.39/ =1.39/ = 2.00       No Known Allergies    Past Surgical History:   Procedure Laterality Date    CARDIAC CATHETERIZATION          COLONOSCOPY      COLOGUARD---NEG = , rech     CORONARY ARTERY BYPASS GRAFT  2011    X 2    Dr. Ayers ([facility])    VASECTOMY         Family History   Problem Relation Age of Onset    Hypertension Mother         Responded to medication, lived to be 91.    Parkinsonism Mother     Anxiety disorder Mother     Dementia Mother     Heart disease Father             Parkinsonism Sister     Dementia Sister     Diabetes Sister             Liver disease Sister         fatty liver    No Known Problems Brother        Social History     Socioeconomic History    Marital status:    Tobacco Use    Smoking status: Never    Smokeless tobacco: Never    Tobacco comments:     Smoked sporadically during teen years, none since.   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    Drug use: Not Currently     Types: Marijuana     Comment: Marijuana use early 20s.    Sexual activity: Yes     Partners: Female     Birth control/protection: Surgical     Comment: Vasectomy     Prior to Admission medications    Medication Sig  "Start Date End Date Taking? Authorizing Provider   ALPRAZolam (XANAX) 0.5 MG tablet Take 1 tablet by mouth 4 (Four) Times a Day As Needed. 11/16/22   Melina Quezada DO   aspirin 81 MG chewable tablet Chew 81 mg Daily.    Jonah Cavanaugh MD   atenolol (TENORMIN) 25 MG tablet Take 1 tablet by mouth Daily. 11/16/22   Melina Quezada DO   atorvastatin (LIPITOR) 80 MG tablet Take 1 tablet by mouth Daily. 10/31/22   Melina Quezada DO   coenzyme Q10 100 MG capsule Take 100 mg by mouth Daily.    Jonah Cavanaugh MD   Multiple Vitamins-Minerals (MULTIVITAMIN ADULTS PO) Take  by mouth Daily.    Jonah Cavanaugh MD   Omega-3 Fatty Acids (FISH OIL) 1200 MG capsule capsule Take 1,200 mg by mouth Daily.    Jonah Cavanaugh MD   ondansetron ODT (ZOFRAN-ODT) 4 MG disintegrating tablet Place 1 tablet on the tongue Every 8 (Eight) Hours As Needed for Nausea or Vomiting. 11/16/22   Melina Quezada DO   rizatriptan (MAXALT) 10 MG tablet TAKE ONE TABLET BY MOUTH AT ONSET OF HEADACHE; MAY REPEAT ONE TABLET IN 2 HOURS IF NEEDED. 12/28/22   Melina Quezada DO   traZODone (DESYREL) 100 MG tablet Take 100 mg by mouth every night at bedtime. 9/21/19   Jonah Cavanaugh MD   Triamcinolone Acetonide (NASACORT) 55 MCG/ACT nasal inhaler 2 sprays into the nostril(s) as directed by provider As Needed for Rhinitis.    Jonah Cavanaugh MD   Turmeric Curcumin 500 MG capsule Take 1 capsule by mouth Daily. 11/16/22   Melina Quezada DO     /72   Pulse 82   Temp 97.4 °F (36.3 °C) (Oral)   Resp 21   Ht 180.3 cm (71\")   Wt 118 kg (261 lb 0.4 oz)   SpO2 98%   BMI 36.41 kg/m²         Objective   Physical Exam  General: Well-developed well-appearing, no acute distress, alert and appropriate  Eyes:  sclera nonicteric  HEENT: Mucous membranes moist, no mucosal swelling  Neck: Supple, no nuchal rigidity, no JVD  Respirations: Respirations nonlabored, equal breath sounds bilaterally, clear lungs  Heart regular " rate and rhythm, no murmurs rubs or gallops,   Abdomen soft nontender nondistended, no hepatosplenomegaly, no hernia, no mass, normal bowel sounds,   Extremities no clubbing cyanosis or edema, calves are symmetric and nontender  Neuro cranial nerves grossly intact, no focal limb deficits  Psych oriented, pleasant affect  Skin no rash, brisk cap refill  Procedures           ED Course  ED Course as of 03/01/25 1234   Sat Mar 01, 2025   1216 Case and EKG findings discussed with Dr. Gaitan.  Cath Lab activated per STEMI protocol []   1217 Patient refuses nitroglycerin due to previous reaction [SH]      ED Course User Index  [] Bryce Jones MD      Results for orders placed or performed during the hospital encounter of 03/01/25   ECG 12 Lead Chest Pain    Collection Time: 03/01/25 12:06 PM   Result Value Ref Range    QT Interval 369 ms    QTC Interval 414 ms   CBC Auto Differential    Collection Time: 03/01/25 12:16 PM    Specimen: Blood   Result Value Ref Range    WBC 8.42 3.40 - 10.80 10*3/mm3    RBC 5.05 4.14 - 5.80 10*6/mm3    Hemoglobin 14.9 13.0 - 17.7 g/dL    Hematocrit 44.1 37.5 - 51.0 %    MCV 87.3 79.0 - 97.0 fL    MCH 29.5 26.6 - 33.0 pg    MCHC 33.8 31.5 - 35.7 g/dL    RDW 11.8 (L) 12.3 - 15.4 %    RDW-SD 38.1 37.0 - 54.0 fl    MPV 10.0 6.0 - 12.0 fL    Platelets 235 140 - 450 10*3/mm3    Neutrophil % 56.8 42.7 - 76.0 %    Lymphocyte % 28.9 19.6 - 45.3 %    Monocyte % 11.4 5.0 - 12.0 %    Eosinophil % 1.7 0.3 - 6.2 %    Basophil % 0.8 0.0 - 1.5 %    Immature Grans % 0.4 0.0 - 0.5 %    Neutrophils, Absolute 4.79 1.70 - 7.00 10*3/mm3    Lymphocytes, Absolute 2.43 0.70 - 3.10 10*3/mm3    Monocytes, Absolute 0.96 (H) 0.10 - 0.90 10*3/mm3    Eosinophils, Absolute 0.14 0.00 - 0.40 10*3/mm3    Basophils, Absolute 0.07 0.00 - 0.20 10*3/mm3    Immature Grans, Absolute 0.03 0.00 - 0.05 10*3/mm3    nRBC 0.0 0.0 - 0.2 /100 WBC   Green Top (Gel)    Collection Time: 03/01/25 12:16 PM   Result Value Ref Range     Extra Tube Hold for add-ons.    Lavender Top    Collection Time: 03/01/25 12:16 PM   Result Value Ref Range    Extra Tube hold for add-on    Gold Top - SST    Collection Time: 03/01/25 12:16 PM   Result Value Ref Range    Extra Tube Hold for add-ons.    Light Blue Top    Collection Time: 03/01/25 12:16 PM   Result Value Ref Range    Extra Tube Hold for add-ons.                                                       Medical Decision Making  Patient presents with chest discomfort differential diagnosis including acute coronary syndrome, pneumothorax, pneumonia, pulmonary embolus, dissection    Patient has no neurodeficits or back pain to suggest dissection.  He has no signs of DVT, pulmonary embolus felt to be unlikely.  He does have EKG findings of acute ST elevation myocardial infarction.  Cardiology and Cath Lab were notified.  Patient advised of findings and was ordered nitroglycerin and aspirin heparin.  He was hemodynamically stable.    Critical care time 35 minutes    Problems Addressed:  ST elevation myocardial infarction (STEMI), unspecified artery: complicated acute illness or injury    Amount and/or Complexity of Data Reviewed  Labs: ordered.     Details: CBC normal, additional labs pending  Radiology: ordered and independent interpretation performed.     Details: My independent interpretation of chest x-ray image poststernotomy findings, no pneumothorax, no congestive failure, pending radiology report  ECG/medicine tests: ordered and independent interpretation performed.     Details: My EKG interpretation ST elevation myocardial infarction with inferior ST elevation and anterior lateral ST depression, rate of 75, right bundle branch block, previous EKG from 2021 reviewed    Risk  OTC drugs.  Prescription drug management.        Final diagnoses:   ST elevation myocardial infarction (STEMI), unspecified artery       ED Disposition  ED Disposition       ED Disposition   Send to Cath Lab    Condition   --     Comment   --               No follow-up provider specified.       Medication List      No changes were made to your prescriptions during this visit.            Bryce Jones MD  03/01/25 [id number]

## 2025-03-01 NOTE — Clinical Note
Second inflation of balloon - Max pressure = 15 venessa. 2nd Inflation of balloon - Duration = 15 seconds.

## 2025-03-01 NOTE — CONSULTS
Referring Provider: Dr. Cintron  Acute inferior ST elevation myocardial infarction  Attending: No att. providers found    Reason for Consultation:    Chest pain  Acute coronary artery syndrome  Acute inferior ST elevation myocardial infarction    Chief complaint:    Chest pain  Shortness of breath       History of present illness:     Patient is 69-year-old white gentleman whose past medical history is significant for hypertension, hyperlipidemia, CAD, CABG, who is admitted with acute inferior ST elevation myocardial infarction.    In 2011, patient was diagnosed with two-vessel coronary artery disease and underwent CABG x 2 with LIMA to LAD and SVG graft to the PDA and PLV.  Patient has not seen any cardiologist from last 6 years.    On the day of admission patient woke up with the chest pain.  Patient described this as a dull ache.  Patient was short of breath.  Patient was diaphoretic.  Patient denies any orthopnea PND no syncope or presyncope.  No leg edema noted.    EKG in the emergency room showed inferior ST elevation myocardial infarction with reciprocal changes.    Patient does not smoke or abuse alcohol.    Review of Systems  Review of Systems   Constitutional: Negative for chills and fever.   HENT:  Negative for ear discharge and nosebleeds.    Eyes:  Negative for discharge and redness.   Cardiovascular:  Positive for chest pain. Negative for orthopnea, palpitations, paroxysmal nocturnal dyspnea and syncope.   Respiratory:  Positive for shortness of breath. Negative for cough and wheezing.    Endocrine: Negative for heat intolerance.   Skin:  Negative for rash.   Musculoskeletal:  Negative for arthritis and myalgias.   Gastrointestinal:  Negative for abdominal pain, melena, nausea and vomiting.   Genitourinary:  Negative for dysuria and hematuria.   Neurological:  Negative for dizziness, light-headedness, numbness and tremors.   Psychiatric/Behavioral:  Negative for depression. The patient is not  "nervous/anxious.        Past Medical History  Past Medical History:   Diagnosis Date    Anxiety     ASCVD     CAD 2011    CHOL     Headache     Hypertension 2018    Hypogonadism     RADHA/ CPAP     ------Dr. Garcia    Panic attack     [facility]---Dr Munguia    PSA     2019= 1.07/ =1.39/ =1.39/ = 2.00    and   Past Surgical History:   Procedure Laterality Date    CARDIAC CATHETERIZATION          COLONOSCOPY      COLOGUARD---NEG = , rech     CORONARY ARTERY BYPASS GRAFT  2011    X 2    Dr. Ayers ([facility])    VASECTOMY         Family History  Family History   Problem Relation Age of Onset    Hypertension Mother         Responded to medication, lived to be 91.    Parkinsonism Mother     Anxiety disorder Mother     Dementia Mother     Heart disease Father             Parkinsonism Sister     Dementia Sister     Diabetes Sister             Liver disease Sister         fatty liver    No Known Problems Brother        Social History  Social History     Socioeconomic History    Marital status:    Tobacco Use    Smoking status: Never    Smokeless tobacco: Never    Tobacco comments:     Smoked sporadically during teen years, none since.   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    Drug use: Not Currently     Types: Marijuana     Comment: Marijuana use early 20s.    Sexual activity: Yes     Partners: Female     Birth control/protection: Surgical     Comment: Vasectomy       Objective     Physical Exam:    Vital Signs  Vitals:    25 1315 25 1328 25 1338 25 1352   BP: 130/70 122/71 116/62 108/63   Pulse: 79 98 93 90   Resp: 16 18 18 18   Temp:       TempSrc:       SpO2: 98% 99% 97% 96%   Weight:       Height:           Weight  Flowsheet Rows      Flowsheet Row First Filed Value   Admission Height 180.3 cm (71\") Documented at " 03/01/2025 1203   Admission Weight 118 kg (261 lb 0.4 oz) Documented at 03/01/2025 1208             Constitutional:       Appearance: Well-developed.   Eyes:      General: No scleral icterus.        Right eye: No discharge.   HENT:      Head: Normocephalic and atraumatic.   Neck:      Thyroid: No thyromegaly.      Lymphadenopathy: No cervical adenopathy.   Pulmonary:      Effort: Pulmonary effort is normal. No respiratory distress.      Breath sounds: Normal breath sounds. No wheezing. No rales.   Cardiovascular:      Normal rate. Regular rhythm.      No gallop.    Edema:     Peripheral edema absent.   Abdominal:      Tenderness: There is no abdominal tenderness.   Skin:     Findings: No erythema or rash.   Neurological:      Mental Status: Alert and oriented to person, place, and time.         Results Review:  Lab Results (last 24 hours)       Procedure Component Value Units Date/Time    POC Activated Clotting Time [[id number]]  (Abnormal) Collected: 03/01/25 1336    Specimen: Arterial Blood Updated: 03/01/25 1432     Activated Clotting Time  296 Seconds      Comment: Serial Number: [id number]:  [id number]       POC Activated Clotting Time [[id number]]  (Abnormal) Collected: 03/01/25 1312    Specimen: Arterial Blood Updated: 03/01/25 1432     Activated Clotting Time  158 Seconds      Comment: Serial Number: [id number]:  [id number]       High Sensitivity Troponin T [[id number]]  (Abnormal) Collected: 03/01/25 1216    Specimen: Blood Updated: 03/01/25 1255     HS Troponin T 285 ng/L     Narrative:      High Sensitive Troponin T Reference Range:  <14.0 ng/L- Negative Female for AMI  <22.0 ng/L- Negative Male for AMI  >=14 - Abnormal Female indicating possible myocardial injury.  >=22 - Abnormal Male indicating possible myocardial injury.   Clinicians would have to utilize clinical acumen, EKG, Troponin, and serial changes to determine if it is an Acute Myocardial Infarction or myocardial injury due to an underlying  chronic condition.         Comprehensive Metabolic Panel [[id number]]  (Abnormal) Collected: 03/01/25 1216    Specimen: Blood Updated: 03/01/25 1254     Glucose 99 mg/dL      BUN 6 mg/dL      Creatinine 0.88 mg/dL      Sodium 135 mmol/L      Potassium 4.1 mmol/L      Comment: Slight hemolysis detected by analyzer. Result may be falsely elevated.        Chloride 100 mmol/L      CO2 25.0 mmol/L      Calcium 9.0 mg/dL      Total Protein 6.8 g/dL      Albumin 4.2 g/dL      ALT (SGPT) 33 U/L      AST (SGOT) 75 U/L      Alkaline Phosphatase 72 U/L      Total Bilirubin 0.4 mg/dL      Globulin 2.6 gm/dL      A/G Ratio 1.6 g/dL      BUN/Creatinine Ratio 6.8     Anion Gap 10.0 mmol/L      eGFR 91.9 mL/min/1.73     Narrative:      GFR Categories in Chronic Kidney Disease (CKD)      GFR Category          GFR (mL/min/1.73)    Interpretation  G1                     90 or greater         Normal or high (1)  G2                      60-89                Mild decrease (1)  G3a                   45-59                Mild to moderate decrease  G3b                   30-44                Moderate to severe decrease  G4                    15-29                Severe decrease  G5                    14 or less           Kidney failure          (1)In the absence of evidence of kidney disease, neither GFR category G1 or G2 fulfill the criteria for CKD.    eGFR calculation 2021 CKD-EPI creatinine equation, which does not include race as a factor    Magnesium [[id number]]  (Normal) Collected: 03/01/25 1216    Specimen: Blood Updated: 03/01/25 1254     Magnesium 1.9 mg/dL     Protime-INR [[id number]]  (Normal) Collected: 03/01/25 1216    Specimen: Blood Updated: 03/01/25 1238     Protime 13.2 Seconds      INR 1.01    [place] Draw [[id number]] Collected: 03/01/25 1216    Specimen: Blood Updated: 03/01/25 1230    Narrative:      The following orders were created for panel order [place] Draw.  Procedure                               Abnormality          Status                     ---------                               -----------         ------                     Green Top (Gel)[[id number]]                                  Final result               Lavender Top[[id number]]                                     Final result               Gold Top - SST[[id number]]                                   Final result               Light Blue Top[[id number]]                                   Final result                 Please view results for these tests on the individual orders.    Green Top (Gel) [[id number]] Collected: 03/01/25 1216    Specimen: Blood Updated: 03/01/25 1230     Extra Tube Hold for add-ons.     Comment: Auto resulted.       Lavender Top [[id number]] Collected: 03/01/25 1216    Specimen: Blood Updated: 03/01/25 1230     Extra Tube hold for add-on     Comment: Auto resulted       Gold Top - SST [[id number]] Collected: 03/01/25 1216    Specimen: Blood Updated: 03/01/25 1230     Extra Tube Hold for add-ons.     Comment: Auto resulted.       Light Blue Top [[id number]] Collected: 03/01/25 1216    Specimen: Blood Updated: 03/01/25 1230     Extra Tube Hold for add-ons.     Comment: Auto resulted       CBC & Differential [[id number]]  (Abnormal) Collected: 03/01/25 1216    Specimen: Blood Updated: 03/01/25 1227    Narrative:      The following orders were created for panel order CBC & Differential.  Procedure                               Abnormality         Status                     ---------                               -----------         ------                     CBC Auto Differential[[id number]]        Abnormal            Final result                 Please view results for these tests on the individual orders.    CBC Auto Differential [[id number]]  (Abnormal) Collected: 03/01/25 1216    Specimen: Blood Updated: 03/01/25 1227     WBC 8.42 10*3/mm3      RBC 5.05 10*6/mm3      Hemoglobin 14.9 g/dL      Hematocrit 44.1 %      MCV 87.3 fL      MCH 29.5 pg      MCHC  33.8 g/dL      RDW 11.8 %      RDW-SD 38.1 fl      MPV 10.0 fL      Platelets 235 10*3/mm3      Neutrophil % 56.8 %      Lymphocyte % 28.9 %      Monocyte % 11.4 %      Eosinophil % 1.7 %      Basophil % 0.8 %      Immature Grans % 0.4 %      Neutrophils, Absolute 4.79 10*3/mm3      Lymphocytes, Absolute 2.43 10*3/mm3      Monocytes, Absolute 0.96 10*3/mm3      Eosinophils, Absolute 0.14 10*3/mm3      Basophils, Absolute 0.07 10*3/mm3      Immature Grans, Absolute 0.03 10*3/mm3      nRBC 0.0 /100 WBC           Imaging Results (Last 72 Hours)       Procedure Component Value Units Date/Time    XR Chest 1 View [[id number]] Collected: 03/01/25 1251     Updated: 03/01/25 1254    Narrative:      XR CHEST 1 VW    Date of Exam: 3/1/2025 12:25 PM EST    Indication: Chest pain, heart disease.    Comparison: 3/22/2011.    Findings:  The heart is enlarged. The patient has had a previous CABG procedure. There is a defibrillator paddle obscuring the central aspect of the lower chest. The pulmonary vascular markings are normal. The lungs and pleural spaces are clear. There are chronic   age-related changes involving the bony thorax and thoracic aorta.      Impression:      Impression:  1.Cardiomegaly.  2.No active pulmonary disease.        Electronically Signed: Rohit Brown MD    3/1/2025 12:52 PM EST    Workstation ID: [id number]          ECG 12 Lead Chest Pain   Preliminary Result   HEART RATE=75  bpm   RR Wbgxtdpg=076  ms   WA Vviejuse=441  ms   P Horizontal Axis=-22  deg   P Front Axis=71  deg   QRSD Wenxjdvr=821  ms   QT Vrfbbrqg=680  ms   ANbZ=806  ms   QRS Axis=-71  deg   T Wave Axis=76  deg   - ABNORMAL ECG -   Sinus rhythm   RBBB and LAFB   Inferior infarct, acute   Probable posterior infarct, acute   Date and Time of Study:2025-03-01 12:06:21        CBC    Results from last 7 days   Lab Units 03/01/25  1216   WBC 10*3/mm3 8.42   HEMOGLOBIN g/dL 14.9   PLATELETS 10*3/mm3 235     BMP   Results from last 7 days   Lab Units  03/01/25  1216   SODIUM mmol/L 135*   POTASSIUM mmol/L 4.1   CHLORIDE mmol/L 100   CO2 mmol/L 25.0   BUN mg/dL 6*   CREATININE mg/dL 0.88   GLUCOSE mg/dL 99   MAGNESIUM mg/dL 1.9     CMP   Results from last 7 days   Lab Units 03/01/25  1216   SODIUM mmol/L 135*   POTASSIUM mmol/L 4.1   CHLORIDE mmol/L 100   CO2 mmol/L 25.0   BUN mg/dL 6*   CREATININE mg/dL 0.88   GLUCOSE mg/dL 99   ALBUMIN g/dL 4.2   BILIRUBIN mg/dL 0.4   ALK PHOS U/L 72   AST (SGOT) U/L 75*   ALT (SGPT) U/L 33     Medication Review  Scheduled Meds:nitroglycerin, , ,       Continuous Infusions:   PRN Meds:.  nitroglycerin    [Transfer Hold] sodium chloride    Assessment:      Acute inferior myocardial infarction        Plan:    MDM:    1.  Chest pain:    Patient is chest pain-free.    2.  CAD/CABG:    Patient had two-vessel coronary artery bypass grafting.  I would recommend to proceed with cardiac catheterization    Patient underwent cardiac catheterization and showed high-grade stenosis of SVG graft to PDA after the anastomotic site.  Patient underwent successful stenting.  However patient had high-grade stenosis of distal left main into circumflex artery.    3.  Acute inferior ST elevation myocardial infarction:    Patient underwent successful stenting of SVG graft to PDA after the anastomotic site.  Patient is recommended to be on DAPT.    4.  Mixed hyperlipidemia:    Continue Lipitor.    I discussed the patient's findings and my recommendations with patient and his wife    Dexter Gaitan MD  03/01/25  14:33 EST

## 2025-03-01 NOTE — Clinical Note
Second inflation of balloon - Max pressure = 12 venessa. 2nd Inflation of balloon - Duration = 10 seconds.

## 2025-03-02 ENCOUNTER — APPOINTMENT (OUTPATIENT)
Dept: CARDIOLOGY | Facility: HOSPITAL | Age: 70
DRG: 322 | End: 2025-03-02
Payer: MEDICARE

## 2025-03-02 LAB
ANION GAP SERPL CALCULATED.3IONS-SCNC: 9.1 MMOL/L (ref 5–15)
AORTIC DIMENSIONLESS INDEX: 0.97 (DI)
AV MEAN PRESS GRAD SYS DOP V1V2: NORMAL MMHG
AV VMAX SYS DOP: 116 CM/SEC
BH CV ECHO LEFT VENTRICLE GLOBAL LONGITUDINAL STRAIN: -17.6 %
BH CV ECHO MEAS - ACS: 2.14 CM
BH CV ECHO MEAS - AI P1/2T: 381.3 MSEC
BH CV ECHO MEAS - AO MAX PG: 5.4 MMHG
BH CV ECHO MEAS - AO V2 VTI: NORMAL CM
BH CV ECHO MEAS - EDV(CUBED): 194.3 ML
BH CV ECHO MEAS - EDV(MOD-SP2): 145 ML
BH CV ECHO MEAS - EDV(MOD-SP4): 144 ML
BH CV ECHO MEAS - EF(MOD-SP2): 50.8 %
BH CV ECHO MEAS - EF(MOD-SP4): 52.7 %
BH CV ECHO MEAS - ESV(CUBED): 99.5 ML
BH CV ECHO MEAS - ESV(MOD-SP2): 71.4 ML
BH CV ECHO MEAS - ESV(MOD-SP4): 68.1 ML
BH CV ECHO MEAS - FS: 20 %
BH CV ECHO MEAS - IVS/LVPW: 1.05 CM
BH CV ECHO MEAS - IVSD: 1.27 CM
BH CV ECHO MEAS - LA DIMENSION: 5 CM
BH CV ECHO MEAS - LAT PEAK E' VEL: 12.6 CM/SEC
BH CV ECHO MEAS - LV DIASTOLIC VOL/BSA (35-75): 61 CM2
BH CV ECHO MEAS - LV MASS(C)D: 309.1 GRAMS
BH CV ECHO MEAS - LV MAX PG: 5 MMHG
BH CV ECHO MEAS - LV MEAN PG: 2.6 MMHG
BH CV ECHO MEAS - LV SYSTOLIC VOL/BSA (12-30): 28.8 CM2
BH CV ECHO MEAS - LV V1 MAX: 112 CM/SEC
BH CV ECHO MEAS - LV V1 VTI: 22.6 CM
BH CV ECHO MEAS - LVIDD: 5.8 CM
BH CV ECHO MEAS - LVIDS: 4.6 CM
BH CV ECHO MEAS - LVOT AREA: 3.6 CM2
BH CV ECHO MEAS - LVOT DIAM: 2.14 CM
BH CV ECHO MEAS - LVPWD: 1.2 CM
BH CV ECHO MEAS - MED PEAK E' VEL: 10.6 CM/SEC
BH CV ECHO MEAS - MR MAX PG: 88 MMHG
BH CV ECHO MEAS - MR MAX VEL: 469.1 CM/SEC
BH CV ECHO MEAS - MR MEAN PG: 80 MMHG
BH CV ECHO MEAS - MR MEAN VEL: 445.6 CM/SEC
BH CV ECHO MEAS - MR VTI: 155.3 CM
BH CV ECHO MEAS - MV A MAX VEL: 44.7 CM/SEC
BH CV ECHO MEAS - MV DEC TIME: 0.22 SEC
BH CV ECHO MEAS - MV E MAX VEL: 87.5 CM/SEC
BH CV ECHO MEAS - MV E/A: 1.96
BH CV ECHO MEAS - PA ACC TIME: 0.12 SEC
BH CV ECHO MEAS - PA V2 MAX: 124.1 CM/SEC
BH CV ECHO MEAS - PI END-D VEL: 136.9 CM/SEC
BH CV ECHO MEAS - RAP SYSTOLE: 3 MMHG
BH CV ECHO MEAS - RV MAX PG: 2.28 MMHG
BH CV ECHO MEAS - RV V1 MAX: 75.5 CM/SEC
BH CV ECHO MEAS - RV V1 VTI: 22.1 CM
BH CV ECHO MEAS - RVSP: 41.7 MMHG
BH CV ECHO MEAS - SV(LVOT): 81.1 ML
BH CV ECHO MEAS - SV(MOD-SP2): 73.6 ML
BH CV ECHO MEAS - SV(MOD-SP4): 75.9 ML
BH CV ECHO MEAS - SVI(LVOT): 34.4 ML/M2
BH CV ECHO MEAS - SVI(MOD-SP2): 31.2 ML/M2
BH CV ECHO MEAS - SVI(MOD-SP4): 32.1 ML/M2
BH CV ECHO MEAS - TAPSE (>1.6): 2.08 CM
BH CV ECHO MEAS - TR MAX PG: 38.7 MMHG
BH CV ECHO MEAS - TR MAX VEL: 310.8 CM/SEC
BH CV ECHO MEASUREMENTS AVERAGE E/E' RATIO: 7.54
BH CV XLRA - RV BASE: 4.8 CM
BH CV XLRA - RV LENGTH: 9.1 CM
BH CV XLRA - RV MID: 3.9 CM
BH CV XLRA - TDI S': 13.6 CM/SEC
BUN SERPL-MCNC: 7 MG/DL (ref 8–23)
BUN/CREAT SERPL: 7.3 (ref 7–25)
CALCIUM SPEC-SCNC: 8.8 MG/DL (ref 8.6–10.5)
CHLORIDE SERPL-SCNC: 101 MMOL/L (ref 98–107)
CHOLEST SERPL-MCNC: 132 MG/DL (ref 0–200)
CO2 SERPL-SCNC: 23.9 MMOL/L (ref 22–29)
CREAT SERPL-MCNC: 0.96 MG/DL (ref 0.76–1.27)
DEPRECATED RDW RBC AUTO: 37.7 FL (ref 37–54)
EGFRCR SERPLBLD CKD-EPI 2021: 84.5 ML/MIN/1.73
ERYTHROCYTE [DISTWIDTH] IN BLOOD BY AUTOMATED COUNT: 11.9 % (ref 12.3–15.4)
GEN 5 1HR TROPONIN T REFLEX: 1072 NG/L
GLUCOSE SERPL-MCNC: 119 MG/DL (ref 65–99)
HCT VFR BLD AUTO: 40.9 % (ref 37.5–51)
HDLC SERPL-MCNC: 43 MG/DL (ref 40–60)
HGB BLD-MCNC: 13.8 G/DL (ref 13–17.7)
LDLC SERPL CALC-MCNC: 73 MG/DL (ref 0–100)
LDLC/HDLC SERPL: 1.69 {RATIO}
LEFT ATRIUM VOLUME INDEX: 30.8 ML/M2
LV EF 3D SEGMENTATION: 55 %
LV EF BIPLANE MOD: 52.1 %
MCH RBC QN AUTO: 29.2 PG (ref 26.6–33)
MCHC RBC AUTO-ENTMCNC: 33.7 G/DL (ref 31.5–35.7)
MCV RBC AUTO: 86.7 FL (ref 79–97)
PLATELET # BLD AUTO: 213 10*3/MM3 (ref 140–450)
PMV BLD AUTO: 10.6 FL (ref 6–12)
POTASSIUM SERPL-SCNC: 4.5 MMOL/L (ref 3.5–5.2)
RBC # BLD AUTO: 4.72 10*6/MM3 (ref 4.14–5.8)
SINUS: 3.8 CM
SODIUM SERPL-SCNC: 134 MMOL/L (ref 136–145)
STJ: 3.1 CM
TRIGL SERPL-MCNC: 81 MG/DL (ref 0–150)
TROPONIN T % DELTA: -28
TROPONIN T NUMERIC DELTA: -423 NG/L
TROPONIN T SERPL HS-MCNC: 1495 NG/L
VLDLC SERPL-MCNC: 16 MG/DL (ref 5–40)
WBC NRBC COR # BLD AUTO: 11.96 10*3/MM3 (ref 3.4–10.8)

## 2025-03-02 PROCEDURE — 99232 SBSQ HOSP IP/OBS MODERATE 35: CPT | Performed by: INTERNAL MEDICINE

## 2025-03-02 PROCEDURE — 93306 TTE W/DOPPLER COMPLETE: CPT

## 2025-03-02 PROCEDURE — 85027 COMPLETE CBC AUTOMATED: CPT | Performed by: INTERNAL MEDICINE

## 2025-03-02 PROCEDURE — 93356 MYOCRD STRAIN IMG SPCKL TRCK: CPT

## 2025-03-02 PROCEDURE — 93306 TTE W/DOPPLER COMPLETE: CPT | Performed by: INTERNAL MEDICINE

## 2025-03-02 PROCEDURE — 93356 MYOCRD STRAIN IMG SPCKL TRCK: CPT | Performed by: INTERNAL MEDICINE

## 2025-03-02 PROCEDURE — 63710000001 PROMETHAZINE PER 25 MG: Performed by: INTERNAL MEDICINE

## 2025-03-02 PROCEDURE — 63710000001 ONDANSETRON ODT 4 MG TABLET DISPERSIBLE: Performed by: INTERNAL MEDICINE

## 2025-03-02 PROCEDURE — 25010000002 ONDANSETRON PER 1 MG: Performed by: INTERNAL MEDICINE

## 2025-03-02 PROCEDURE — 80048 BASIC METABOLIC PNL TOTAL CA: CPT | Performed by: INTERNAL MEDICINE

## 2025-03-02 PROCEDURE — 80061 LIPID PANEL: CPT | Performed by: INTERNAL MEDICINE

## 2025-03-02 PROCEDURE — 84484 ASSAY OF TROPONIN QUANT: CPT | Performed by: INTERNAL MEDICINE

## 2025-03-02 RX ORDER — CLOPIDOGREL 300 MG/1
600 TABLET, FILM COATED ORAL ONCE
Status: COMPLETED | OUTPATIENT
Start: 2025-03-03 | End: 2025-03-03

## 2025-03-02 RX ORDER — PROMETHAZINE HYDROCHLORIDE 25 MG/1
25 SUPPOSITORY RECTAL EVERY 6 HOURS PRN
Status: DISCONTINUED | OUTPATIENT
Start: 2025-03-02 | End: 2025-03-02

## 2025-03-02 RX ORDER — ISOSORBIDE MONONITRATE 30 MG/1
30 TABLET, EXTENDED RELEASE ORAL
Status: DISCONTINUED | OUTPATIENT
Start: 2025-03-02 | End: 2025-03-03 | Stop reason: HOSPADM

## 2025-03-02 RX ORDER — METOPROLOL SUCCINATE 25 MG/1
25 TABLET, EXTENDED RELEASE ORAL
Status: DISCONTINUED | OUTPATIENT
Start: 2025-03-02 | End: 2025-03-03 | Stop reason: HOSPADM

## 2025-03-02 RX ORDER — PROMETHAZINE HYDROCHLORIDE 25 MG/1
25 TABLET ORAL EVERY 6 HOURS PRN
Status: COMPLETED | OUTPATIENT
Start: 2025-03-02 | End: 2025-03-02

## 2025-03-02 RX ORDER — CLOPIDOGREL BISULFATE 75 MG/1
75 TABLET ORAL DAILY
Status: DISCONTINUED | OUTPATIENT
Start: 2025-03-04 | End: 2025-03-03 | Stop reason: HOSPADM

## 2025-03-02 RX ADMIN — ISOSORBIDE MONONITRATE 30 MG: 30 TABLET, EXTENDED RELEASE ORAL at 10:29

## 2025-03-02 RX ADMIN — ALPRAZOLAM 0.5 MG: 0.5 TABLET ORAL at 18:32

## 2025-03-02 RX ADMIN — ALPRAZOLAM 0.5 MG: 0.5 TABLET ORAL at 05:43

## 2025-03-02 RX ADMIN — PROMETHAZINE HYDROCHLORIDE 25 MG: 25 TABLET ORAL at 10:29

## 2025-03-02 RX ADMIN — TICAGRELOR 90 MG: 90 TABLET ORAL at 09:29

## 2025-03-02 RX ADMIN — ACETAMINOPHEN 650 MG: 325 TABLET, FILM COATED ORAL at 14:56

## 2025-03-02 RX ADMIN — ASPIRIN 81 MG CHEWABLE TABLET 81 MG: 81 TABLET CHEWABLE at 09:29

## 2025-03-02 RX ADMIN — ATORVASTATIN CALCIUM 80 MG: 40 TABLET, FILM COATED ORAL at 09:29

## 2025-03-02 RX ADMIN — MUPIROCIN 1 APPLICATION: 20 OINTMENT TOPICAL at 09:30

## 2025-03-02 RX ADMIN — ONDANSETRON 4 MG: 2 INJECTION, SOLUTION INTRAMUSCULAR; INTRAVENOUS at 16:57

## 2025-03-02 RX ADMIN — MUPIROCIN 1 APPLICATION: 20 OINTMENT TOPICAL at 21:23

## 2025-03-02 RX ADMIN — ALPRAZOLAM 0.5 MG: 0.5 TABLET ORAL at 12:07

## 2025-03-02 RX ADMIN — ONDANSETRON 4 MG: 4 TABLET, ORALLY DISINTEGRATING ORAL at 04:25

## 2025-03-02 RX ADMIN — METOPROLOL SUCCINATE 25 MG: 25 TABLET, EXTENDED RELEASE ORAL at 10:29

## 2025-03-02 RX ADMIN — ACETAMINOPHEN 650 MG: 325 TABLET, FILM COATED ORAL at 19:10

## 2025-03-02 NOTE — PHARMACY PATIENT ASSISTANCE
Transitions of Care (test claim):    Drug Brilinta:   Covered/PA Required: Covered without PA  Copay Per Month: $467.18  Is the medication eligible for a trial card/copay card? N/A    Please note that when it states medication is eligible for a trail card that this only means that the medication has a free trial available. This does not assess if the patient has already utilized the once in a lifetime free trial.     This test claim coverage is only valid on the date the note is published. Copay/coverage could vary depending on patient coverage at a later date.  Additionally this test claim is for the sole purpose of a price check not a clinical recommendation.     For billing questions please call [facility] Pharmacist at ext: [phone]  For M2B questions please call [facility] at ext: [phone]    Tabatha PradoD, BCPS

## 2025-03-02 NOTE — PLAN OF CARE
Problem: Adult Inpatient Plan of Care  Goal: Plan of Care Review  Outcome: Progressing  Flowsheets (Taken 3/2/2025 0628)  Progress: improving  Plan of Care Reviewed With: patient  Goal: Patient-Specific Goal (Individualized)  Outcome: Progressing  Goal: Absence of Hospital-Acquired Illness or Injury  Outcome: Progressing  Intervention: Identify and Manage Fall Risk  Description: Perform standard risk assessment on admission using a validated tool or comprehensive approach appropriate to the patient; reassess fall risk frequently, with change in status or transfer to another level of care.  Communicate risk to interprofessional healthcare team; ensure fall risk visible cue.  Determine need for increased observation, equipment and environmental modification, as well as use of supportive, nonskid footwear.  Adjust safety measures to individual needs and identified risk factors.  Reinforce the importance of active participation with fall risk prevention, safety, and physical activity with the patient and family.  Perform regular intentional rounding to assess need for position change, pain assessment and personal needs, including assistance with toileting.  Recent Flowsheet Documentation  Taken 3/2/2025 0332 by So Lucero RN  Safety Promotion/Fall Prevention: safety round/check completed  Taken 3/2/2025 0300 by So Lucero RN  Safety Promotion/Fall Prevention: safety round/check completed  Taken 3/2/2025 0200 by So Lucero RN  Safety Promotion/Fall Prevention: safety round/check completed  Taken 3/2/2025 0100 by So Lucero RN  Safety Promotion/Fall Prevention: safety round/check completed  Taken 3/2/2025 0000 by So Lucero RN  Safety Promotion/Fall Prevention: safety round/check completed  Taken 3/1/2025 2300 by So Lucero RN  Safety Promotion/Fall Prevention: safety round/check completed  Taken 3/1/2025 2203 by So Lucero RN  Safety Promotion/Fall Prevention: safety  round/check completed  Taken 3/1/2025 2100 by So Lucero RN  Safety Promotion/Fall Prevention: safety round/check completed  Taken 3/1/2025 2000 by So Lucero RN  Safety Promotion/Fall Prevention: safety round/check completed  Taken 3/1/2025 1900 by So Lucero RN  Safety Promotion/Fall Prevention: safety round/check completed  Intervention: Prevent Skin Injury  Description: Perform a screening for skin injury risk, such as pressure or moisture-associated skin damage on admission and at regular intervals throughout hospital stay.  Keep all areas of skin (especially folds) clean and dry.  Maintain adequate skin hydration.  Relieve and redistribute pressure and protect bony prominences and skin at risk for injury; implement measures based on patient-specific risk factors.  Match turning and repositioning schedule to clinical condition.  Encourage weight shift frequently; assist with reposition if unable to complete independently.  Float heels off bed; avoid pressure on the Achilles tendon.  Keep skin free from extended contact with medical devices.  Optimize nutrition and hydration.  Encourage functional activity and mobility, as early as tolerated.  Use aids (e.g., slide boards, mechanical lift) during transfer.  Recent Flowsheet Documentation  Taken 3/2/2025 0332 by So Lucero RN  Body Position: weight shifting  Skin Protection: transparent dressing maintained  Taken 3/2/2025 0000 by So Lucero RN  Body Position: weight shifting  Skin Protection: transparent dressing maintained  Taken 3/1/2025 2000 by So Lucero RN  Body Position: position changed independently  Skin Protection: transparent dressing maintained  Intervention: Prevent and Manage VTE (Venous Thromboembolism) Risk  Description: Assess for VTE (venous thromboembolism) risk.  Promote early mobilization; encourage both active and passive leg exercises, if unable to ambulate.  Initiate and maintain compression or other  therapy, as indicated, based on identified risk in accordance with organizational protocol and provider order.  Recognize the patient's individual risk for bleeding before initiating pharmacologic thromboprophylaxis.  Recent Flowsheet Documentation  Taken 3/2/2025 0332 by So Lucero RN  VTE Prevention/Management:   bilateral   SCDs (sequential compression devices) off  Taken 3/2/2025 0000 by So Lucero RN  VTE Prevention/Management:   bilateral   SCDs (sequential compression devices) off   patient refused intervention  Taken 3/1/2025 2000 by So Lucero RN  VTE Prevention/Management:   bilateral   SCDs (sequential compression devices) off   patient refused intervention  Intervention: Prevent Infection  Description: Maintain skin and mucous membrane integrity; promote hand, oral and pulmonary hygiene.  Optimize fluid balance, nutrition, sleep and glycemic control to maximize infection resistance.  Identify potential sources of infection early to prevent or mitigate progression of infection (e.g., wound, lines, devices).  Evaluate ongoing need for invasive devices; remove promptly when no longer indicated.  Review vaccination status.  Recent Flowsheet Documentation  Taken 3/2/2025 0332 by So Lucero RN  Infection Prevention:   environmental surveillance performed   equipment surfaces disinfected   hand hygiene promoted   personal protective equipment utilized   rest/sleep promoted   single patient room provided  Taken 3/2/2025 0000 by So Lucero RN  Infection Prevention:   environmental surveillance performed   equipment surfaces disinfected   hand hygiene promoted   personal protective equipment utilized   rest/sleep promoted   single patient room provided  Taken 3/1/2025 2000 by So Lucero RN  Infection Prevention:   environmental surveillance performed   equipment surfaces disinfected   hand hygiene promoted   rest/sleep promoted   personal protective equipment utilized   single  patient room provided  Goal: Optimal Comfort and Wellbeing  Outcome: Progressing  Intervention: Provide Person-Centered Care  Description: Use a family-focused approach to care; encourage support system presence and participation.  Develop trust and rapport by proactively providing information, encouraging questions, addressing concerns and offering reassurance.  Acknowledge emotional response to hospitalization.  Recognize and utilize personal coping strategies and strengths; develop goals via shared decision-making.  Honor spiritual and cultural preferences.  Recent Flowsheet Documentation  Taken 3/2/2025 0332 by So Lucero RN  Trust Relationship/Rapport:   care explained   emotional support provided   choices provided   empathic listening provided   questions answered   questions encouraged   reassurance provided   thoughts/feelings acknowledged  Taken 3/2/2025 0000 by So Lucero RN  Trust Relationship/Rapport:   care explained   choices provided   emotional support provided   empathic listening provided   questions answered   reassurance provided   questions encouraged   thoughts/feelings acknowledged  Taken 3/1/2025 2000 by So Lucero RN  Trust Relationship/Rapport:   care explained   choices provided   emotional support provided   empathic listening provided   questions encouraged   reassurance provided   questions answered   thoughts/feelings acknowledged  Goal: Readiness for Transition of Care  Outcome: Progressing   Goal Outcome Evaluation:  Plan of Care Reviewed With: patient        Progress: improving

## 2025-03-02 NOTE — PLAN OF CARE
Goal Outcome Evaluation:      Pt complains of nausea, not relieved by Zofran. Pt reports feeling better after taking phenergan. Pt complains of headache scaling the pain at 3 in a scale 0-10. Pt walked as requested by Dr Gaitan, no discomfort noted/reported by pt. No further concerns at this time. Plan of care ongoing.

## 2025-03-02 NOTE — PROGRESS NOTES
Critical Care Progress Note     Toy Shannon : 1955 MRN:[id number] LOS:1  Rm: 3104/1     Principal Problem: Acute inferior myocardial infarction     Reason for follow up: All the medical problems listed below    Summary     69-year-old male states he woke up with some pressure in his chest and a dull ache. He reports no radiating pain or diaphoresis or palpitation. He states he has had some mild associated nausea. States the pain has eased somewhat from peak of intensity. States he took an anxiolytic as well as some ibuprofen prior to arrival.     EKG in ER showed acute inferior STEMI.  Cardiology was consulted and pt was taken emergently to the cath lab where he underwent PTCA and stenting to SVG graft to PDA.  Pt admitted to CVU for observation.    Patient is on Hospital Day: 2.    Significant Events / Subjective     25 : Pt afebrile, VSS.  Some nausea this am; Zofran helping a little, but not much.  Will add prn rectal phenergan.  Pt needs to be OOB to chair today and walking.  Cardiology planning for repeat PCI in 2 weeks.  Can go home tomorrow.  BB increased.  Stable for PCU.    Assessment / Plan     Acute inferior STEMI  CAD  Hx of 2-v CABG  -S/p LHC w/ PTCA and stenting to SVG graft to PDA  -Check A1c  -On ASA & Brilinta  -TTE completedd; read pending        HLD  -Resume high-dose atorvastatin  -Check lipid panel     Anxiety  -Continue prn Xanax       Disposition:  PCU    Code status:   Level Of Support Discussed With: Patient  Code Status (Patient has no pulse and is not breathing): CPR (Attempt to Resuscitate)  Medical Interventions (Patient has pulse or is breathing): Full Support       Nutrition:   Diet: Cardiac; Healthy Heart (2-3 Na+); Fluid Consistency: Thin (IDDSI 0)   Patient isn't on Tube Feeding     VTE Prophylaxis:  No VTE prophylaxis order currently exists.           Objective / Physical Exam     Vital signs:  Temp: 97.6 °F (36.4 °C)  BP: 127/74  Heart Rate: 70  Resp: 26  SpO2: 90  %  Weight: 118 kg (260 lb 2.3 oz)    Admission Weight: Weight: 118 kg (261 lb 0.4 oz)  Current Weight: Weight: 118 kg (260 lb 2.3 oz)    Input/Output in last 24 hours:    Intake/Output Summary (Last 24 hours) at 3/2/2025 0848  Last data filed at 3/2/2025 0600  Gross per 24 hour   Intake 1400 ml   Output 700 ml   Net 700 ml      Net IO Since Admission: 700 mL [03/02/25 0848]     Physical Exam     GEN:  Pleasant, obese man.  Appears appropriate for stated age.  WD/WN/WH.  Lying in bed on RA.  NAD.  NEURO:  Brainstem reflexes intact.  No obvious focal deficit.  Moves all 4 ext.  HEENT:  N/AT.  PERRL.  MMM.  Oropharynx non-erythematous.  No drainage from the eyes/ears/nose.  No conjunctival petechiae.  No oral thrush.  Auditory and visual acuity grossly wnl.  Good dentition.  Voice normal.  NECK:  Supple, NT, trachea midline.  No meningismus.  No ROM limitation.  No torticollis.  No JVD.  No thyromegaly.    CHEST/LUNGS:  Breath sounds are clear and equal bilaterally.  No w/r/r.  Chest excursion equal bilaterally.    CARDIOVASCULAR:  RRR w/o murmur noted.    GI:  Abdomen soft, NT, ND, +BS.  No HSM.  :  Normal external genitalia.  No Allen cath in place.  EXTREMITIES:  No deformity or amputation.  No cyanosis, edema, or asymmetry.  Pulses 2+ and equal in BLE's.    SKIN:  Warm, dry, and pink.  No rash, breakdown, or track marks noted.  LYMPHATICS/HEME:  No overt LAD or abnormal bruising.  No lymphedema.  MSK:  Normal ROM.  No joint abnormalities noted.  Strength is 5/5 and equal in BUE and BLE's.  PSYCH:  Pleasant.  A&Ox 3.  Normal mood and affect.  Responds appropriately to commands and appears to comprehend instructions.           Radiology and Labs     Results from last 7 days   Lab Units 03/02/25  0434 03/01/25  1216   WBC 10*3/mm3 11.96* 8.42   HEMOGLOBIN g/dL 13.8 14.9   HEMATOCRIT % 40.9 44.1   PLATELETS 10*3/mm3 213 235      Results from last 7 days   Lab Units 03/01/25  1216   PROTIME Seconds 13.2   INR  1.01       Results from last 7 days   Lab Units 03/02/25  0434 03/01/25  1216   SODIUM mmol/L 134* 135*   POTASSIUM mmol/L 4.5 4.1   CHLORIDE mmol/L 101 100   CO2 mmol/L 23.9 25.0   ANION GAP mmol/L 9.1 10.0   BUN mg/dL 7* 6*   CREATININE mg/dL 0.96 0.88   GLUCOSE mg/dL 119* 99   MAGNESIUM mg/dL  --  1.9   ALT (SGPT) U/L  --  33   AST (SGOT) U/L  --  75*   ALK PHOS U/L  --  72      Results from last 7 days   Lab Units 03/01/25  1216   ALT (SGPT) U/L 33   AST (SGOT) U/L 75*   ALK PHOS U/L 72           XR Chest 1 View    Result Date: 3/1/2025  Impression: 1.Cardiomegaly. 2.No active pulmonary disease. Electronically Signed: Rohit Brown MD  3/1/2025 12:52 PM EST  Workstation ID: [id number]     Current medications     Scheduled Meds:   aspirin, 81 mg, Oral, Daily  atorvastatin, 80 mg, Oral, Daily  mupirocin, 1 Application, Each Nare, BID  ticagrelor, 90 mg, Oral, BID        Continuous Infusions:          Plan discussed with RN. Reviewed all other data in the last 24 hours, including but not limited to vitals, labs, microbiology, imaging and pertinent notes from other providers.  Plan also discussed with pt and wife at the bedside.      Benoit Weber DO   Critical Care  03/02/25   08:48 EST

## 2025-03-02 NOTE — CONSULTS
"    [facility] MEDICINE SERVICE  TRANSFER OF CARE/ACCEPTANCE NOTE    PATIENT NAME: Toy Shannon  : 1955  MRN: [id number]     Active Hospital Problems    Diagnosis  POA    **Acute inferior myocardial infarction [I21.19]  Yes    Acute ST elevation myocardial infarction (STEMI) of inferior wall [I21.19]  Yes      Resolved Hospital Problems   No resolved problems to display.       Patient seen and examined by me on 25 at 1220.  Interim History:  Patient reports otherwise doing well.  Denies chest pain, shortness of breath at this time only complaint is nausea to which she did just take Phenergan.  Patient does report that he was able to take \"a few bites of food but I think that is all I can do\".  Patient requested that blood pressure cuff be removed from his arm until needed, this was taken off of him by this provider.  Patient remains on telemetry.  Patient with no other complaints at this time.  Patient verbalized understanding of plan of care.    I have noted the following changes since admission:     Patient was originally admitted with acute inferior ST elevation myocardial infarction, patient did go to the Cath Lab emergently where he underwent PTCA and stenting to SVG graft to PDA.  Patient was subsequently admitted to the CVU for observation under the intensivist care.    Per intensivist, patient is stable to transfer out of CVU to a PCU level of care, cardiology would like to keep patient for an additional day and patient is likely to be able to go home tomorrow.  Noted plans for cardiology to repeat PCI in 2 weeks.  Noted cardiology recommendation for patient to be on DAPT.     I have reviewed the H&P, diagnostic data and plan of care for Toy Shannon.  Hospitalist team will be taking over care of this patient during the current hospitalization.        Signature: Electronically signed by HUEY Andrew, 25, 12:01 EST.  [facility] Hospitalist Team    *This is a nonbillable " encounter*

## 2025-03-02 NOTE — PROGRESS NOTES
"   LOS: 1 day   Admitting Physician- Kelechi Cintron MD    Reason For Followup:    Angina pectoris  Acute inferior ST elevation myocardial infarction  CAD  CABG  Hyperlipidemia    Subjective     Chest pain-free    Objective     Hemodynamics are stable    Review of Systems:   Review of Systems   Constitutional: Negative for chills and fever.   HENT:  Negative for ear discharge and nosebleeds.    Eyes:  Negative for discharge and redness.   Cardiovascular:  Negative for chest pain, orthopnea, palpitations, paroxysmal nocturnal dyspnea and syncope.   Respiratory:  Negative for cough, shortness of breath and wheezing.    Endocrine: Negative for heat intolerance.   Skin:  Negative for rash.   Musculoskeletal:  Negative for arthritis and myalgias.   Gastrointestinal:  Negative for abdominal pain, melena, nausea and vomiting.   Genitourinary:  Negative for dysuria and hematuria.   Neurological:  Negative for dizziness, light-headedness, numbness and tremors.   Psychiatric/Behavioral:  Negative for depression. The patient is not nervous/anxious.          Vital Signs  Vitals:    03/02/25 0700 03/02/25 0802 03/02/25 0806 03/02/25 1137   BP: 112/56 127/63 127/74 103/51   BP Location:   Right arm Right arm   Patient Position:   Lying Lying   Pulse: 63 64 70 70   Resp:  17 26 18   Temp:   97.6 °F (36.4 °C) 97.8 °F (36.6 °C)   TempSrc:   Oral Oral   SpO2:   90% 95%   Weight:  118 kg (260 lb 2.3 oz)     Height:  180.3 cm (70.98\")       Wt Readings from Last 1 Encounters:   03/02/25 118 kg (260 lb 2.3 oz)       Intake/Output Summary (Last 24 hours) at 3/2/2025 1352  Last data filed at 3/2/2025 0600  Gross per 24 hour   Intake 1400 ml   Output 700 ml   Net 700 ml     Physical Exam:  Constitutional:       Appearance: Well-developed.   Eyes:      General: No scleral icterus.        Right eye: No discharge.   HENT:      Head: Normocephalic and atraumatic.   Neck:      Thyroid: No thyromegaly.      Lymphadenopathy: No cervical " adenopathy.   Pulmonary:      Effort: Pulmonary effort is normal. No respiratory distress.      Breath sounds: Normal breath sounds. No wheezing. No rales.   Cardiovascular:      Normal rate. Regular rhythm.      No gallop.    Edema:     Peripheral edema absent.   Abdominal:      Tenderness: There is no abdominal tenderness.   Skin:     Findings: No erythema or rash.   Neurological:      Mental Status: Alert and oriented to person, place, and time.         Results Review:   Lab Results (last 24 hours)       Procedure Component Value Units Date/Time    High Sensitivity Troponin T [[id number]]  (Abnormal) Collected: 03/02/25 0434    Specimen: Blood Updated: 03/02/25 0515     HS Troponin T 1,495 ng/L     Narrative:      High Sensitive Troponin T Reference Range:  <14.0 ng/L- Negative Female for AMI  <22.0 ng/L- Negative Male for AMI  >=14 - Abnormal Female indicating possible myocardial injury.  >=22 - Abnormal Male indicating possible myocardial injury.   Clinicians would have to utilize clinical acumen, EKG, Troponin, and serial changes to determine if it is an Acute Myocardial Infarction or myocardial injury due to an underlying chronic condition.         Basic Metabolic Panel [[id number]]  (Abnormal) Collected: 03/02/25 0434    Specimen: Blood Updated: 03/02/25 0513     Glucose 119 mg/dL      BUN 7 mg/dL      Creatinine 0.96 mg/dL      Sodium 134 mmol/L      Potassium 4.5 mmol/L      Comment: Specimen hemolyzed.  Result may be falsely elevated.        Chloride 101 mmol/L      CO2 23.9 mmol/L      Calcium 8.8 mg/dL      BUN/Creatinine Ratio 7.3     Anion Gap 9.1 mmol/L      eGFR 84.5 mL/min/1.73     Narrative:      GFR Categories in Chronic Kidney Disease (CKD)      GFR Category          GFR (mL/min/1.73)    Interpretation  G1                     90 or greater         Normal or high (1)  G2                      60-89                Mild decrease (1)  G3a                   45-59                Mild to moderate  decrease  G3b                   30-44                Moderate to severe decrease  G4                    15-29                Severe decrease  G5                    14 or less           Kidney failure          (1)In the absence of evidence of kidney disease, neither GFR category G1 or G2 fulfill the criteria for CKD.    eGFR calculation 2021 CKD-EPI creatinine equation, which does not include race as a factor    Lipid Panel [[id number]] Collected: 03/02/25 0434    Specimen: Blood Updated: 03/02/25 0508     Total Cholesterol 132 mg/dL      Triglycerides 81 mg/dL      HDL Cholesterol 43 mg/dL      LDL Cholesterol  73 mg/dL      VLDL Cholesterol 16 mg/dL      LDL/HDL Ratio 1.69    Narrative:      Cholesterol Reference Ranges  (U.S. Department of Health and Human Services ATP III Classifications)    Desirable          <200 mg/dL  Borderline High    200-239 mg/dL  High Risk          >240 mg/dL      Triglyceride Reference Ranges  (U.S. Department of Health and Human Services ATP III Classifications)    Normal           <150 mg/dL  Borderline High  150-199 mg/dL  High             200-499 mg/dL  Very High        >500 mg/dL    HDL Reference Ranges  (U.S. Department of Health and Human Services ATP III Classifications)    Low     <40 mg/dl (major risk factor for CHD)  High    >60 mg/dl ('negative' risk factor for CHD)        LDL Reference Ranges  (U.S. Department of Health and Human Services ATP III Classifications)    Optimal          <100 mg/dL  Near Optimal     100-129 mg/dL  Borderline High  130-159 mg/dL  High             160-189 mg/dL  Very High        >189 mg/dL    LDL is calculated using the NIH LDL-C calculation.      CBC (No Diff) [[id number]]  (Abnormal) Collected: 03/02/25 0434    Specimen: Blood Updated: 03/02/25 0458     WBC 11.96 10*3/mm3      RBC 4.72 10*6/mm3      Hemoglobin 13.8 g/dL      Hematocrit 40.9 %      MCV 86.7 fL      MCH 29.2 pg      MCHC 33.7 g/dL      RDW 11.9 %      RDW-SD 37.7 fl      MPV 10.6 fL       Platelets 213 10*3/mm3     POC Activated Clotting Time [[id number]]  (Abnormal) Collected: 03/01/25 1857    Specimen: Blood Updated: 03/01/25 1859     Activated Clotting Time  147 Seconds      Comment: Serial Number: [id number]:  [id number]       POC Activated Clotting Time [[id number]]  (Abnormal) Collected: 03/01/25 1717    Specimen: Blood Updated: 03/01/25 1732     Activated Clotting Time  164 Seconds      Comment: Serial Number: [id number]:  [id number]       Hemoglobin A1c [[id number]]  (Normal) Collected: 03/01/25 1216    Specimen: Blood Updated: 03/01/25 1652     Hemoglobin A1C 5.57 %     POC Activated Clotting Time [[id number]]  (Abnormal) Collected: 03/01/25 1516    Specimen: Blood Updated: 03/01/25 1519     Activated Clotting Time  199 Seconds      Comment: Serial Number: [id number]:  [id number]       POC Activated Clotting Time [[id number]]  (Abnormal) Collected: 03/01/25 1403    Specimen: Blood Updated: 03/01/25 1433     Activated Clotting Time  285 Seconds      Comment: Serial Number: [id number]:  [id number]       POC Activated Clotting Time [[id number]]  (Abnormal) Collected: 03/01/25 1336    Specimen: Arterial Blood Updated: 03/01/25 1432     Activated Clotting Time  296 Seconds      Comment: Serial Number: [id number]:  [id number]       POC Activated Clotting Time [[id number]]  (Abnormal) Collected: 03/01/25 1312    Specimen: Arterial Blood Updated: 03/01/25 1432     Activated Clotting Time  158 Seconds      Comment: Serial Number: [id number]:  [id number]             Imaging Results (Last 72 Hours)       Procedure Component Value Units Date/Time    XR Chest 1 View [[id number]] Collected: 03/01/25 1251     Updated: 03/01/25 1254    Narrative:      XR CHEST 1 VW    Date of Exam: 3/1/2025 12:25 PM EST    Indication: Chest pain, heart disease.    Comparison: 3/22/2011.    Findings:  The heart is enlarged. The patient has had a previous CABG procedure. There is a defibrillator paddle obscuring the central  aspect of the lower chest. The pulmonary vascular markings are normal. The lungs and pleural spaces are clear. There are chronic   age-related changes involving the bony thorax and thoracic aorta.      Impression:      Impression:  1.Cardiomegaly.  2.No active pulmonary disease.        Electronically Signed: Rohit Brown MD    3/1/2025 12:52 PM EST    Workstation ID: [id number]          ECG/EMG Results (most recent)       Procedure Component Value Units Date/Time    ECG 12 Lead Chest Pain [[id number]] Collected: 03/01/25 1206     Updated: 03/01/25 1208     QT Interval 369 ms      QTC Interval 414 ms     Narrative:      HEART RATE=75  bpm  RR Pfvzirkt=667  ms  ME Xxtsdwgy=498  ms  P Horizontal Axis=-22  deg  P Front Axis=71  deg  QRSD Gdefgwss=007  ms  QT Kmeohpba=754  ms  LZfP=104  ms  QRS Axis=-71  deg  T Wave Axis=76  deg  - ABNORMAL ECG -  Sinus rhythm  RBBB and LAFB  Inferior infarct, acute  Probable posterior infarct, acute  Date and Time of Study:2025-03-01 12:06:21    Adult Transthoracic Echo Complete W/ Cont if Necessary Per Protocol [[id number]] Resulted: 03/02/25 0928     Updated: 03/02/25 0928     EF(MOD-bp) 52.1 %      EF_3D-VOL 55.0 %      LV GLOBAL STRAIN  -17.6 %      LVIDd 5.8 cm      LVIDs 4.6 cm      IVSd 1.27 cm      LVPWd 1.20 cm      FS 20.0 %      IVS/LVPW 1.05 cm      ESV(cubed) 99.5 ml      LV Sys Vol (BSA corrected) 28.8 cm2      EDV(cubed) 194.3 ml      LV Wallace Vol (BSA corrected) 61.0 cm2      LV mass(C)d 309.1 grams      LVOT area 3.6 cm2      LVOT diam 2.14 cm      EDV(MOD-sp2) 145.0 ml      EDV(MOD-sp4) 144.0 ml      ESV(MOD-sp2) 71.4 ml      ESV(MOD-sp4) 68.1 ml      SV(MOD-sp2) 73.6 ml      SV(MOD-sp4) 75.9 ml      SVi(MOD-SP2) 31.2 ml/m2      SVi(MOD-SP4) 32.1 ml/m2      SVi (LVOT) 34.4 ml/m2      EF(MOD-sp2) 50.8 %      EF(MOD-sp4) 52.7 %      MV E max romi 87.5 cm/sec      MV A max romi 44.7 cm/sec      MV dec time 0.22 sec      MV E/A 1.96     LA ESV Index (BP) 30.8 ml/m2      Med Peak  E' Phil 10.6 cm/sec      Lat Peak E' Phil 12.6 cm/sec      TR max phil 310.8 cm/sec      Avg E/e' ratio 7.54     SV(LVOT) 81.1 ml      RV Base 4.8 cm      RV Mid 3.9 cm      RV Length 9.1 cm      TAPSE (>1.6) 2.08 cm      RV S' 13.6 cm/sec      LA dimension (2D)  5.0 cm      LV V1 max 112.0 cm/sec      LV V1 max PG 5.0 mmHg      LV V1 mean PG 2.6 mmHg      LV V1 VTI 22.6 cm      Ao pk phil 116.0 cm/sec      Ao max PG 5.4 mmHg      Ao mean PG Invalid mmHg      Ao V2 VTI Invalid cm      AI P1/2t 381.3 msec      MR max phil 469.1 cm/sec      MR max PG 88.0 mmHg      MR mean phil 445.6 cm/sec      MR mean PG 80.0 mmHg      MR .3 cm      TR max PG 38.7 mmHg      RVSP(TR) 41.7 mmHg      RAP systole 3.0 mmHg      RV V1 max PG 2.28 mmHg      RV V1 max 75.5 cm/sec      RV V1 VTI 22.1 cm      PA V2 max 124.1 cm/sec      PA acc time 0.12 sec      PI end-d phil 136.9 cm/sec      ACS 2.14 cm      Sinus 3.8 cm      STJ 3.1 cm      Dimensionless Index 0.97 (DI)     Narrative:        Estimated right ventricular systolic pressure from tricuspid   regurgitation is mildly elevated (35-45 mmHg).      Impression:                CBC    Results from last 7 days   Lab Units 03/02/25  0434 03/01/25  1216   WBC 10*3/mm3 11.96* 8.42   HEMOGLOBIN g/dL 13.8 14.9   PLATELETS 10*3/mm3 213 235     BMP   Results from last 7 days   Lab Units 03/02/25  0434 03/01/25  1216   SODIUM mmol/L 134* 135*   POTASSIUM mmol/L 4.5 4.1   CHLORIDE mmol/L 101 100   CO2 mmol/L 23.9 25.0   BUN mg/dL 7* 6*   CREATININE mg/dL 0.96 0.88   GLUCOSE mg/dL 119* 99   MAGNESIUM mg/dL  --  1.9     CMP   Results from last 7 days   Lab Units 03/02/25  0434 03/01/25  1216   SODIUM mmol/L 134* 135*   POTASSIUM mmol/L 4.5 4.1   CHLORIDE mmol/L 101 100   CO2 mmol/L 23.9 25.0   BUN mg/dL 7* 6*   CREATININE mg/dL 0.96 0.88   GLUCOSE mg/dL 119* 99   ALBUMIN g/dL  --  4.2   BILIRUBIN mg/dL  --  0.4   ALK PHOS U/L  --  72   AST (SGOT) U/L  --  75*   ALT (SGPT) U/L  --  33     Cardiac  Studies:  Echo- Results for orders placed in visit on 03/12/18    SCANNED - ECHOCARDIOGRAM    Stress Myoview-  Cath-      Medication Review:   Scheduled Meds:aspirin, 81 mg, Oral, Daily  atorvastatin, 80 mg, Oral, Daily  isosorbide mononitrate, 30 mg, Oral, Q24H  metoprolol succinate XL, 25 mg, Oral, Q24H  mupirocin, 1 Application, Each Nare, BID  ticagrelor, 90 mg, Oral, BID      Continuous Infusions:   PRN Meds:.  acetaminophen    ALPRAZolam    aluminum-magnesium hydroxide-simethicone    atropine    diphenhydrAMINE    nitroglycerin    ondansetron ODT **OR** ondansetron    sodium chloride    traZODone      Assessment & Plan     Acute inferior myocardial infarction    Acute ST elevation myocardial infarction (STEMI) of inferior wall    MDM:    1.  Acute ST elevation myocardial infarction inferior wall:    Patient underwent urgent cardiac catheterization and stenting of PDA through SVG graft to PDA was done.  Patient is on aspirin and Brilinta.  It appears that there went to LAD expensive for the patient.  If patient could not afford Brilinta switch to Plavix.  Give 300 mg bolus and start 75 mg daily possibly tomorrow after I talk with the family.    2.  CAD/CABG:    Patient has left main disease extending into LCx.  It is technically challenging lesion.  Patient would need stenting of this vessel.  However patient is adamant to go home and come back.  Is a high-grade stenosis.  I would like the patient should ambulate if he does well he can discharge.    3.  Chest pain:    Patient is free of chest pain.  Start Imdur.  If patient has chest pain he would need intervention prior to discharge    4.  Hyperlipidemia:    Continue Lipitor    5.  Myocardial infarction:    Patient had fixed coronary artery disease and had 100% occlusion of CAD which is the cause of MI.  This is a fixed coronary artery disease        Dexter Gaitan MD  03/02/25  13:52 EST

## 2025-03-03 ENCOUNTER — READMISSION MANAGEMENT (OUTPATIENT)
Dept: CALL CENTER | Facility: HOSPITAL | Age: 70
End: 2025-03-03
Payer: MEDICARE

## 2025-03-03 VITALS
RESPIRATION RATE: 16 BRPM | WEIGHT: 260.14 LBS | HEART RATE: 67 BPM | TEMPERATURE: 97.8 F | OXYGEN SATURATION: 95 % | BODY MASS INDEX: 36.42 KG/M2 | DIASTOLIC BLOOD PRESSURE: 55 MMHG | HEIGHT: 71 IN | SYSTOLIC BLOOD PRESSURE: 118 MMHG

## 2025-03-03 DIAGNOSIS — I21.29 ST ELEVATION MYOCARDIAL INFARCTION (STEMI) INVOLVING OTHER CORONARY ARTERY: Primary | ICD-10-CM

## 2025-03-03 PROCEDURE — 99232 SBSQ HOSP IP/OBS MODERATE 35: CPT | Performed by: INTERNAL MEDICINE

## 2025-03-03 PROCEDURE — 25010000002 ONDANSETRON PER 1 MG: Performed by: INTERNAL MEDICINE

## 2025-03-03 PROCEDURE — 63710000001 ONDANSETRON ODT 4 MG TABLET DISPERSIBLE: Performed by: INTERNAL MEDICINE

## 2025-03-03 RX ORDER — ONDANSETRON 4 MG/1
4 TABLET, ORALLY DISINTEGRATING ORAL EVERY 6 HOURS PRN
Qty: 20 TABLET | Refills: 0 | Status: SHIPPED | OUTPATIENT
Start: 2025-03-03

## 2025-03-03 RX ORDER — METOPROLOL SUCCINATE 25 MG/1
25 TABLET, EXTENDED RELEASE ORAL
Qty: 30 TABLET | Refills: 0 | Status: ON HOLD | OUTPATIENT
Start: 2025-03-04 | End: 2025-03-14

## 2025-03-03 RX ORDER — ASPIRIN 81 MG/1
81 TABLET, CHEWABLE ORAL DAILY
Qty: 90 TABLET | Refills: 0 | Status: SHIPPED | OUTPATIENT
Start: 2025-03-04

## 2025-03-03 RX ORDER — CLOPIDOGREL BISULFATE 75 MG/1
75 TABLET ORAL DAILY
Qty: 30 TABLET | Refills: 0 | Status: ON HOLD | OUTPATIENT
Start: 2025-03-04 | End: 2025-03-14

## 2025-03-03 RX ORDER — ISOSORBIDE MONONITRATE 30 MG/1
30 TABLET, EXTENDED RELEASE ORAL
Qty: 30 TABLET | Refills: 0 | Status: SHIPPED | OUTPATIENT
Start: 2025-03-04 | End: 2025-03-14 | Stop reason: HOSPADM

## 2025-03-03 RX ADMIN — ONDANSETRON 4 MG: 4 TABLET, ORALLY DISINTEGRATING ORAL at 01:19

## 2025-03-03 RX ADMIN — ALPRAZOLAM 0.5 MG: 0.5 TABLET ORAL at 00:33

## 2025-03-03 RX ADMIN — ATORVASTATIN CALCIUM 80 MG: 40 TABLET, FILM COATED ORAL at 09:32

## 2025-03-03 RX ADMIN — METOPROLOL SUCCINATE 25 MG: 25 TABLET, EXTENDED RELEASE ORAL at 09:32

## 2025-03-03 RX ADMIN — MUPIROCIN 1 APPLICATION: 20 OINTMENT TOPICAL at 09:32

## 2025-03-03 RX ADMIN — TRAZODONE HYDROCHLORIDE 100 MG: 100 TABLET ORAL at 00:33

## 2025-03-03 RX ADMIN — ISOSORBIDE MONONITRATE 30 MG: 30 TABLET, EXTENDED RELEASE ORAL at 09:32

## 2025-03-03 RX ADMIN — ALPRAZOLAM 0.5 MG: 0.5 TABLET ORAL at 14:04

## 2025-03-03 RX ADMIN — ALPRAZOLAM 0.5 MG: 0.5 TABLET ORAL at 07:41

## 2025-03-03 RX ADMIN — ASPIRIN 81 MG CHEWABLE TABLET 81 MG: 81 TABLET CHEWABLE at 09:32

## 2025-03-03 RX ADMIN — CLOPIDOGREL BISULFATE 600 MG: 300 TABLET, FILM COATED ORAL at 09:32

## 2025-03-03 RX ADMIN — ONDANSETRON 4 MG: 2 INJECTION, SOLUTION INTRAMUSCULAR; INTRAVENOUS at 07:41

## 2025-03-03 NOTE — CASE MANAGEMENT/SOCIAL WORK
Discharge Planning Assessment   Paulo     Patient Name: Toy Shannon  MRN: [id number]  Today's Date: 3/3/2025    Admit Date: 3/1/2025    Plan: RADHIKA Plan: Anticipate routine home with spouse.   Discharge Needs Assessment       Row Name 03/03/25 1128       Living Environment    People in Home spouse    Name(s) of People in Home Snata Shannon    Current Living Arrangements home    Potentially Unsafe Housing Conditions none    In the past 12 months has the electric, gas, oil, or water company threatened to shut off services in your home? No    Primary Care Provided by self    Provides Primary Care For no one    Family Caregiver if Needed spouse    Family Caregiver Names Santa Shannon    Quality of Family Relationships helpful;involved;supportive    Able to Return to Prior Arrangements yes       Resource/Environmental Concerns    Resource/Environmental Concerns none    Transportation Concerns none       Transportation Needs    In the past 12 months, has lack of transportation kept you from medical appointments or from getting medications? no    In the past 12 months, has lack of transportation kept you from meetings, work, or from getting things needed for daily living? No       Food Insecurity    Within the past 12 months, you worried that your food would run out before you got the money to buy more. Never true    Within the past 12 months, the food you bought just didn't last and you didn't have money to get more. Never true       Transition Planning    Patient/Family Anticipates Transition to home with family    Patient/Family Anticipated Services at Transition none    Transportation Anticipated car, drives self;family or friend will provide       Discharge Needs Assessment    Readmission Within the Last 30 Days no previous admission in last 30 days    Equipment Currently Used at Home cpap    Concerns to be Addressed no discharge needs identified;denies needs/concerns at this time    Do you want help finding or  keeping work or a job? I do not need or want help    Do you want help with school or training? For example, starting or completing job training or getting a high school diploma, GED or equivalent No    Anticipated Changes Related to Illness none    Equipment Needed After Discharge none    Provided Post Acute Provider List? N/A    Provided Post Acute Provider Quality & Resource List? N/A                   Discharge Plan       Row Name 03/03/25 1128       Plan    Plan DC Plan: Anticipate routine home with spouse.    Patient/Family in Agreement with Plan yes    Plan Comments CM spoke with patient at bedside to discuss admission assessment and discharge planning. Patient confirms PCP and pharmacy. PCP updated in Epic. Patient denies any difficulty affording medications or food at this time. Patient denies any additional needs for services or DME at this time. Patient reports independent with ADL's and drives. Patient spouse will provide transportation when ready for DC.CM reviewed chart documentation for clinical updates. DC orders in place. No additional service needs identified. IMM delivered. No barriers.                 Expected Discharge Date and Time       Expected Discharge Date Expected Discharge Time    Mar 3, 2025            Demographic Summary       Row Name 03/03/25 1127       General Information    Admission Type inpatient    Arrived From emergency department;home    Required Notices Provided Important Message from Medicare    Referral Source admission list    Reason for Consult discharge planning    Preferred Language English       Contact Information    Permission Granted to Share Info With                    Functional Status       Row Name 03/03/25 1128       Functional Status    Usual Activity Tolerance excellent    Current Activity Tolerance excellent       Physical Activity    On average, how many days per week do you engage in moderate to strenuous exercise (like a brisk walk)? 3 days     On average, how many minutes do you engage in exercise at this level? 30 min    Number of minutes of exercise per week 90       Functional Status, IADL    Medications independent    Meal Preparation independent    Housekeeping independent    Laundry independent    Shopping independent    If for any reason you need help with day-to-day activities such as bathing, preparing meals, shopping, managing finances, etc., do you get the help you need? I don't need any help       Mental Status    General Appearance WDL WDL       Mental Status Summary    Recent Changes in Mental Status/Cognitive Functioning no changes       Employment/    Employment Status employed part-time    Current or Previous Occupation other (see comments)    Employment/ Comments ACP                  Patient Forms       Row Name 03/03/25 1123       Patient Forms    Important Message from Medicare (IMM) Delivered    Delivered to Patient    Method of delivery In person  Patient verbalized understanding. Signed copy left at bedside.                      Alpa Wolf RN     Office Phone: [phone]  Office Cell:     [phone]

## 2025-03-03 NOTE — CASE MANAGEMENT/SOCIAL WORK
Case Management Discharge Note           Transportation Services  Private: Car (with spouse)    Final Discharge Disposition Code: 01 - home or self-care

## 2025-03-03 NOTE — H&P (VIEW-ONLY)
LOS: 2 days   Admitting Physician- Rocky Teague MD    Reason For Followup:    Angina pectoris  Acute inferior ST elevation myocardial infarction  CAD  CABG  Hyperlipidemia    Subjective     Patient has no complaints of chest pain, palpitations, orthopnea, dyspnea on exertio or weakness.  He states he has been ambulating without any dizziness, diaphoresis, chest pain or shortness of breath.  He states his only complaint is he is very anxious.  He is hoping to be discharged.     Objective     Hemodynamics are stable, -130, heart rate 60-70.  Patient states that he had no adverse overnight events, he states he had no pain or bleeding from cath site.  He denies numbness tingling, temperature or color change in right lower extremity.  He does have some bruising around site, no hematoma or bleeding noted.    Review of Systems:   Review of Systems   Constitutional: Negative for chills and fever.   HENT:  Negative for ear discharge and nosebleeds.    Eyes:  Negative for discharge and redness.   Cardiovascular:  Negative for chest pain, orthopnea, palpitations, paroxysmal nocturnal dyspnea and syncope.   Respiratory:  Negative for cough, shortness of breath and wheezing.    Endocrine: Negative for heat intolerance.   Skin:  Negative for rash.   Musculoskeletal:  Negative for arthritis and myalgias.   Gastrointestinal:  Negative for abdominal pain, melena, nausea and vomiting.   Genitourinary:  Negative for dysuria and hematuria.   Neurological:  Negative for dizziness, light-headedness, numbness and tremors.   Psychiatric/Behavioral:  Negative for depression. The patient is nervous/anxious.          Vital Signs  Vitals:    03/03/25 1000 03/03/25 1100 03/03/25 1200 03/03/25 1240   BP:    123/53   BP Location:    Left arm   Patient Position:    Lying   Pulse: 67 71 71 70   Resp:    16   Temp:    97.8 °F (36.6 °C)   TempSrc:    Oral   SpO2:       Weight:       Height:         Wt Readings from Last 1 Encounters:    03/02/25 118 kg (260 lb 2.3 oz)       Intake/Output Summary (Last 24 hours) at 3/3/2025 1653  Last data filed at 3/3/2025 1100  Gross per 24 hour   Intake 350 ml   Output 1100 ml   Net -750 ml     Physical Exam:  Constitutional:       Appearance: Well-developed.   Eyes:      General: No scleral icterus.        Right eye: No discharge.   HENT:      Head: Normocephalic and atraumatic.   Neck:      Thyroid: No thyromegaly.      Lymphadenopathy: No cervical adenopathy.   Pulmonary:      Effort: Pulmonary effort is normal. No respiratory distress.      Breath sounds: Normal breath sounds. No wheezing. No rales.   Cardiovascular:      Normal rate. Regular rhythm.      No gallop.    Edema:     Peripheral edema absent.   Abdominal:      Tenderness: There is no abdominal tenderness.   Skin:     Findings: Bruising present. No erythema or rash.      Comments: R groin cath site   Neurological:      Mental Status: Alert and oriented to person, place, and time.         Results Review:   Lab Results (last 24 hours)       Procedure Component Value Units Date/Time    High Sensitivity Troponin T 1Hr [[id number]]  (Abnormal) Collected: 03/02/25 1646    Specimen: Blood Updated: 03/02/25 1721     HS Troponin T 1,072 ng/L      Troponin T Numeric Delta -423 ng/L      Troponin T % Delta -28    Narrative:      High Sensitive Troponin T Reference Range:  <14.0 ng/L- Negative Female for AMI  <22.0 ng/L- Negative Male for AMI  >=14 - Abnormal Female indicating possible myocardial injury.  >=22 - Abnormal Male indicating possible myocardial injury.   Clinicians would have to utilize clinical acumen, EKG, Troponin, and serial changes to determine if it is an Acute Myocardial Infarction or myocardial injury due to an underlying chronic condition.               Imaging Results (Last 72 Hours)       Procedure Component Value Units Date/Time    XR Chest 1 View [[id number]] Collected: 03/01/25 1251     Updated: 03/01/25 1254    Narrative:      XR  CHEST 1 VW    Date of Exam: 3/1/2025 12:25 PM EST    Indication: Chest pain, heart disease.    Comparison: 3/22/2011.    Findings:  The heart is enlarged. The patient has had a previous CABG procedure. There is a defibrillator paddle obscuring the central aspect of the lower chest. The pulmonary vascular markings are normal. The lungs and pleural spaces are clear. There are chronic   age-related changes involving the bony thorax and thoracic aorta.      Impression:      Impression:  1.Cardiomegaly.  2.No active pulmonary disease.        Electronically Signed: Rohit Brown MD    3/1/2025 12:52 PM EST    Workstation ID: [id number]          ECG/EMG Results (most recent)       Procedure Component Value Units Date/Time    ECG 12 Lead Chest Pain [[id number]] Collected: 03/01/25 1206     Updated: 03/01/25 1208     QT Interval 369 ms      QTC Interval 414 ms     Narrative:      HEART RATE=75  bpm  RR Nttqgzql=930  ms  GA Rcilkmjd=549  ms  P Horizontal Axis=-22  deg  P Front Axis=71  deg  QRSD Kydbdzyw=490  ms  QT Lvdlefgd=707  ms  ZYtR=056  ms  QRS Axis=-71  deg  T Wave Axis=76  deg  - ABNORMAL ECG -  Sinus rhythm  RBBB and LAFB  Inferior infarct, acute  Probable posterior infarct, acute  Date and Time of Study:2025-03-01 12:06:21    Adult Transthoracic Echo Complete W/ Cont if Necessary Per Protocol [[id number]] Resulted: 03/02/25 1426     Updated: 03/02/25 1426     EF(MOD-bp) 52.1 %      EF_3D-VOL 55.0 %      LV GLOBAL STRAIN  -17.6 %      LVIDd 5.8 cm      LVIDs 4.6 cm      IVSd 1.27 cm      LVPWd 1.20 cm      FS 20.0 %      IVS/LVPW 1.05 cm      ESV(cubed) 99.5 ml      LV Sys Vol (BSA corrected) 28.8 cm2      EDV(cubed) 194.3 ml      LV Wallace Vol (BSA corrected) 61.0 cm2      LV mass(C)d 309.1 grams      LVOT area 3.6 cm2      LVOT diam 2.14 cm      EDV(MOD-sp2) 145.0 ml      EDV(MOD-sp4) 144.0 ml      ESV(MOD-sp2) 71.4 ml      ESV(MOD-sp4) 68.1 ml      SV(MOD-sp2) 73.6 ml      SV(MOD-sp4) 75.9 ml      SVi(MOD-SP2) 31.2 ml/m2       SVi(MOD-SP4) 32.1 ml/m2      SVi (LVOT) 34.4 ml/m2      EF(MOD-sp2) 50.8 %      EF(MOD-sp4) 52.7 %      MV E max phil 87.5 cm/sec      MV A max phil 44.7 cm/sec      MV dec time 0.22 sec      MV E/A 1.96     LA ESV Index (BP) 30.8 ml/m2      Med Peak E' Phil 10.6 cm/sec      Lat Peak E' Phil 12.6 cm/sec      TR max phil 310.8 cm/sec      Avg E/e' ratio 7.54     SV(LVOT) 81.1 ml      RV Base 4.8 cm      RV Mid 3.9 cm      RV Length 9.1 cm      TAPSE (>1.6) 2.08 cm      RV S' 13.6 cm/sec      LA dimension (2D)  5.0 cm      LV V1 max 112.0 cm/sec      LV V1 max PG 5.0 mmHg      LV V1 mean PG 2.6 mmHg      LV V1 VTI 22.6 cm      Ao pk phil 116.0 cm/sec      Ao max PG 5.4 mmHg      Ao mean PG Invalid mmHg      Ao V2 VTI Invalid cm      AI P1/2t 381.3 msec      MR max phil 469.1 cm/sec      MR max PG 88.0 mmHg      MR mean phil 445.6 cm/sec      MR mean PG 80.0 mmHg      MR .3 cm      TR max PG 38.7 mmHg      RVSP(TR) 41.7 mmHg      RAP systole 3.0 mmHg      RV V1 max PG 2.28 mmHg      RV V1 max 75.5 cm/sec      RV V1 VTI 22.1 cm      PA V2 max 124.1 cm/sec      PA acc time 0.12 sec      PI end-d phil 136.9 cm/sec      ACS 2.14 cm      Sinus 3.8 cm      STJ 3.1 cm      Dimensionless Index 0.97 (DI)     Narrative:        Left ventricular systolic function is normal. Calculated left   ventricular 3D EF = 55% Left ventricular ejection fraction appears to be   56 - 60%.    Left ventricular diastolic function was normal.    Moderate aortic valve regurgitation is present.    Estimated right ventricular systolic pressure from tricuspid   regurgitation is mildly elevated (35-45 mmHg).      Telemetry Scan [[id number]] Resulted: 03/01/25     Updated: 03/03/25 1119    Telemetry Scan [[id number]] Resulted: 03/01/25     Updated: 03/03/25 1129    Telemetry Scan [[id number]] Resulted: 03/01/25     Updated: 03/03/25 1140    Telemetry Scan [[id number]] Resulted: 03/01/25     Updated: 03/03/25 1230    Telemetry Scan [[id number]] Resulted:  03/01/25     Updated: 03/03/25 1326    Telemetry Scan [[id number]] Resulted: 03/01/25     Updated: 03/03/25 1351    Telemetry Scan [[id number]] Resulted: 03/01/25     Updated: 03/03/25 1352    Telemetry Scan [[id number]] Resulted: 03/01/25     Updated: 03/03/25 1355    Telemetry Scan [[id number]] Resulted: 03/01/25     Updated: 03/03/25 1417    Telemetry Scan [[id number]] Resulted: 03/01/25     Updated: 03/03/25 1423    Telemetry Scan [[id number]] Resulted: 03/01/25     Updated: 03/03/25 1432    Telemetry Scan [[id number]] Resulted: 03/01/25     Updated: 03/03/25 1435          CBC    Results from last 7 days   Lab Units 03/02/25  0434 03/01/25  1216   WBC 10*3/mm3 11.96* 8.42   HEMOGLOBIN g/dL 13.8 14.9   PLATELETS 10*3/mm3 213 235     BMP   Results from last 7 days   Lab Units 03/02/25  0434 03/01/25  1216   SODIUM mmol/L 134* 135*   POTASSIUM mmol/L 4.5 4.1   CHLORIDE mmol/L 101 100   CO2 mmol/L 23.9 25.0   BUN mg/dL 7* 6*   CREATININE mg/dL 0.96 0.88   GLUCOSE mg/dL 119* 99   MAGNESIUM mg/dL  --  1.9     CMP   Results from last 7 days   Lab Units 03/02/25  0434 03/01/25  1216   SODIUM mmol/L 134* 135*   POTASSIUM mmol/L 4.5 4.1   CHLORIDE mmol/L 101 100   CO2 mmol/L 23.9 25.0   BUN mg/dL 7* 6*   CREATININE mg/dL 0.96 0.88   GLUCOSE mg/dL 119* 99   ALBUMIN g/dL  --  4.2   BILIRUBIN mg/dL  --  0.4   ALK PHOS U/L  --  72   AST (SGOT) U/L  --  75*   ALT (SGPT) U/L  --  33     Cardiac Studies:  Echo- Results for orders placed during the hospital encounter of 03/01/25    Adult Transthoracic Echo Complete W/ Cont if Necessary Per Protocol    Interpretation Summary    Left ventricular systolic function is normal. Calculated left ventricular 3D EF = 55% Left ventricular ejection fraction appears to be 56 - 60%.    Left ventricular diastolic function was normal.    Moderate aortic valve regurgitation is present.    Estimated right ventricular systolic pressure from tricuspid regurgitation is mildly elevated (35-45  mmHg).    Stress Myoview-  Cath-      Medication Review:   Scheduled Meds:aspirin, 81 mg, Oral, Daily  atorvastatin, 80 mg, Oral, Daily  [START ON 3/4/2025] clopidogrel, 75 mg, Oral, Daily  isosorbide mononitrate, 30 mg, Oral, Q24H  metoprolol succinate XL, 25 mg, Oral, Q24H  mupirocin, 1 Application, Each Nare, BID      Continuous Infusions:   PRN Meds:.  acetaminophen    ALPRAZolam    aluminum-magnesium hydroxide-simethicone    atropine    diphenhydrAMINE    nitroglycerin    ondansetron ODT **OR** ondansetron    sodium chloride    traZODone      Assessment & Plan     Acute inferior myocardial infarction    Acute ST elevation myocardial infarction (STEMI) of inferior wall    MDM:    1.  Acute ST elevation myocardial infarction inferior wall:    Patient underwent urgent cardiac catheterization and stenting of PDA through SVG graft to PDA was done.  Patient is on aspirin. Patient could not afford Brilinta, switch to Plavix.  Cont lipitor.     2.  CAD/CABG:    Patient has left main disease extending into LCx.  It is technically challenging lesion.  Patient would need stenting of this vessel, high grade stenosis.  Patient anxious to go home and agreeable come back.  Patient states he has been ambulating with no symptoms. Plan to d/c and return 3/13 as outpt for intervention.    3.  Chest pain:    Patient is free of chest pain today, on Imdur    4.  Hyperlipidemia:    Continue Lipitor    Discussed with patient process for outpatient cardiac cath, scheduling will contact him.  Patient and wife voiced understanding and agreement with plan.  Okay to discharge from cardiac standpoint      Patient is seen and examined and findings are verified.  All data is reviewed by me personally.  Assessment and plan formulated by APC was done after discussion with attending.  I spent more than 50% of time in taking care of the patient.    Patient is laying in the bed.  He was able to walk and did not have any chest pain.    Hemodynamics  are stable    Normal S1 and S2.  No pericardial rub or murmur abdominal exam is benign    Patient wants to be discharged.  Intervention of left main/circumflex artery would be done as a outpatient.  Continue DAPT.    Electronically signed by Dexter Gaitan MD, 03/03/25, 4:53 PM EST.      Dexter Gaitan MD  03/03/25  16:53 EST

## 2025-03-03 NOTE — PROGRESS NOTES
LOS: 2 days   Admitting Physician- Rocky Teague MD    Reason For Followup:    Angina pectoris  Acute inferior ST elevation myocardial infarction  CAD  CABG  Hyperlipidemia    Subjective     Patient has no complaints of chest pain, palpitations, orthopnea, dyspnea on exertio or weakness.  He states he has been ambulating without any dizziness, diaphoresis, chest pain or shortness of breath.  He states his only complaint is he is very anxious.  He is hoping to be discharged.     Objective     Hemodynamics are stable, -130, heart rate 60-70.  Patient states that he had no adverse overnight events, he states he had no pain or bleeding from cath site.  He denies numbness tingling, temperature or color change in right lower extremity.  He does have some bruising around site, no hematoma or bleeding noted.    Review of Systems:   Review of Systems   Constitutional: Negative for chills and fever.   HENT:  Negative for ear discharge and nosebleeds.    Eyes:  Negative for discharge and redness.   Cardiovascular:  Negative for chest pain, orthopnea, palpitations, paroxysmal nocturnal dyspnea and syncope.   Respiratory:  Negative for cough, shortness of breath and wheezing.    Endocrine: Negative for heat intolerance.   Skin:  Negative for rash.   Musculoskeletal:  Negative for arthritis and myalgias.   Gastrointestinal:  Negative for abdominal pain, melena, nausea and vomiting.   Genitourinary:  Negative for dysuria and hematuria.   Neurological:  Negative for dizziness, light-headedness, numbness and tremors.   Psychiatric/Behavioral:  Negative for depression. The patient is nervous/anxious.          Vital Signs  Vitals:    03/03/25 1000 03/03/25 1100 03/03/25 1200 03/03/25 1240   BP:    123/53   BP Location:    Left arm   Patient Position:    Lying   Pulse: 67 71 71 70   Resp:    16   Temp:    97.8 °F (36.6 °C)   TempSrc:    Oral   SpO2:       Weight:       Height:         Wt Readings from Last 1 Encounters:    03/02/25 118 kg (260 lb 2.3 oz)       Intake/Output Summary (Last 24 hours) at 3/3/2025 1653  Last data filed at 3/3/2025 1100  Gross per 24 hour   Intake 350 ml   Output 1100 ml   Net -750 ml     Physical Exam:  Constitutional:       Appearance: Well-developed.   Eyes:      General: No scleral icterus.        Right eye: No discharge.   HENT:      Head: Normocephalic and atraumatic.   Neck:      Thyroid: No thyromegaly.      Lymphadenopathy: No cervical adenopathy.   Pulmonary:      Effort: Pulmonary effort is normal. No respiratory distress.      Breath sounds: Normal breath sounds. No wheezing. No rales.   Cardiovascular:      Normal rate. Regular rhythm.      No gallop.    Edema:     Peripheral edema absent.   Abdominal:      Tenderness: There is no abdominal tenderness.   Skin:     Findings: Bruising present. No erythema or rash.      Comments: R groin cath site   Neurological:      Mental Status: Alert and oriented to person, place, and time.         Results Review:   Lab Results (last 24 hours)       Procedure Component Value Units Date/Time    High Sensitivity Troponin T 1Hr [[id number]]  (Abnormal) Collected: 03/02/25 1646    Specimen: Blood Updated: 03/02/25 1721     HS Troponin T 1,072 ng/L      Troponin T Numeric Delta -423 ng/L      Troponin T % Delta -28    Narrative:      High Sensitive Troponin T Reference Range:  <14.0 ng/L- Negative Female for AMI  <22.0 ng/L- Negative Male for AMI  >=14 - Abnormal Female indicating possible myocardial injury.  >=22 - Abnormal Male indicating possible myocardial injury.   Clinicians would have to utilize clinical acumen, EKG, Troponin, and serial changes to determine if it is an Acute Myocardial Infarction or myocardial injury due to an underlying chronic condition.               Imaging Results (Last 72 Hours)       Procedure Component Value Units Date/Time    XR Chest 1 View [[id number]] Collected: 03/01/25 1251     Updated: 03/01/25 1254    Narrative:      XR  CHEST 1 VW    Date of Exam: 3/1/2025 12:25 PM EST    Indication: Chest pain, heart disease.    Comparison: 3/22/2011.    Findings:  The heart is enlarged. The patient has had a previous CABG procedure. There is a defibrillator paddle obscuring the central aspect of the lower chest. The pulmonary vascular markings are normal. The lungs and pleural spaces are clear. There are chronic   age-related changes involving the bony thorax and thoracic aorta.      Impression:      Impression:  1.Cardiomegaly.  2.No active pulmonary disease.        Electronically Signed: Rohit Brown MD    3/1/2025 12:52 PM EST    Workstation ID: [id number]          ECG/EMG Results (most recent)       Procedure Component Value Units Date/Time    ECG 12 Lead Chest Pain [[id number]] Collected: 03/01/25 1206     Updated: 03/01/25 1208     QT Interval 369 ms      QTC Interval 414 ms     Narrative:      HEART RATE=75  bpm  RR Emthmble=704  ms  RI Kjlrbrlq=555  ms  P Horizontal Axis=-22  deg  P Front Axis=71  deg  QRSD Crixehtx=304  ms  QT Nohntblr=319  ms  WRuV=609  ms  QRS Axis=-71  deg  T Wave Axis=76  deg  - ABNORMAL ECG -  Sinus rhythm  RBBB and LAFB  Inferior infarct, acute  Probable posterior infarct, acute  Date and Time of Study:2025-03-01 12:06:21    Adult Transthoracic Echo Complete W/ Cont if Necessary Per Protocol [[id number]] Resulted: 03/02/25 1426     Updated: 03/02/25 1426     EF(MOD-bp) 52.1 %      EF_3D-VOL 55.0 %      LV GLOBAL STRAIN  -17.6 %      LVIDd 5.8 cm      LVIDs 4.6 cm      IVSd 1.27 cm      LVPWd 1.20 cm      FS 20.0 %      IVS/LVPW 1.05 cm      ESV(cubed) 99.5 ml      LV Sys Vol (BSA corrected) 28.8 cm2      EDV(cubed) 194.3 ml      LV Wallace Vol (BSA corrected) 61.0 cm2      LV mass(C)d 309.1 grams      LVOT area 3.6 cm2      LVOT diam 2.14 cm      EDV(MOD-sp2) 145.0 ml      EDV(MOD-sp4) 144.0 ml      ESV(MOD-sp2) 71.4 ml      ESV(MOD-sp4) 68.1 ml      SV(MOD-sp2) 73.6 ml      SV(MOD-sp4) 75.9 ml      SVi(MOD-SP2) 31.2 ml/m2       SVi(MOD-SP4) 32.1 ml/m2      SVi (LVOT) 34.4 ml/m2      EF(MOD-sp2) 50.8 %      EF(MOD-sp4) 52.7 %      MV E max phil 87.5 cm/sec      MV A max phil 44.7 cm/sec      MV dec time 0.22 sec      MV E/A 1.96     LA ESV Index (BP) 30.8 ml/m2      Med Peak E' Phil 10.6 cm/sec      Lat Peak E' Phil 12.6 cm/sec      TR max phil 310.8 cm/sec      Avg E/e' ratio 7.54     SV(LVOT) 81.1 ml      RV Base 4.8 cm      RV Mid 3.9 cm      RV Length 9.1 cm      TAPSE (>1.6) 2.08 cm      RV S' 13.6 cm/sec      LA dimension (2D)  5.0 cm      LV V1 max 112.0 cm/sec      LV V1 max PG 5.0 mmHg      LV V1 mean PG 2.6 mmHg      LV V1 VTI 22.6 cm      Ao pk phil 116.0 cm/sec      Ao max PG 5.4 mmHg      Ao mean PG Invalid mmHg      Ao V2 VTI Invalid cm      AI P1/2t 381.3 msec      MR max phil 469.1 cm/sec      MR max PG 88.0 mmHg      MR mean phil 445.6 cm/sec      MR mean PG 80.0 mmHg      MR .3 cm      TR max PG 38.7 mmHg      RVSP(TR) 41.7 mmHg      RAP systole 3.0 mmHg      RV V1 max PG 2.28 mmHg      RV V1 max 75.5 cm/sec      RV V1 VTI 22.1 cm      PA V2 max 124.1 cm/sec      PA acc time 0.12 sec      PI end-d phil 136.9 cm/sec      ACS 2.14 cm      Sinus 3.8 cm      STJ 3.1 cm      Dimensionless Index 0.97 (DI)     Narrative:        Left ventricular systolic function is normal. Calculated left   ventricular 3D EF = 55% Left ventricular ejection fraction appears to be   56 - 60%.    Left ventricular diastolic function was normal.    Moderate aortic valve regurgitation is present.    Estimated right ventricular systolic pressure from tricuspid   regurgitation is mildly elevated (35-45 mmHg).      Telemetry Scan [[id number]] Resulted: 03/01/25     Updated: 03/03/25 1119    Telemetry Scan [[id number]] Resulted: 03/01/25     Updated: 03/03/25 1129    Telemetry Scan [[id number]] Resulted: 03/01/25     Updated: 03/03/25 1140    Telemetry Scan [[id number]] Resulted: 03/01/25     Updated: 03/03/25 1230    Telemetry Scan [[id number]] Resulted:  03/01/25     Updated: 03/03/25 1326    Telemetry Scan [[id number]] Resulted: 03/01/25     Updated: 03/03/25 1351    Telemetry Scan [[id number]] Resulted: 03/01/25     Updated: 03/03/25 1352    Telemetry Scan [[id number]] Resulted: 03/01/25     Updated: 03/03/25 1355    Telemetry Scan [[id number]] Resulted: 03/01/25     Updated: 03/03/25 1417    Telemetry Scan [[id number]] Resulted: 03/01/25     Updated: 03/03/25 1423    Telemetry Scan [[id number]] Resulted: 03/01/25     Updated: 03/03/25 1432    Telemetry Scan [[id number]] Resulted: 03/01/25     Updated: 03/03/25 1435          CBC    Results from last 7 days   Lab Units 03/02/25  0434 03/01/25  1216   WBC 10*3/mm3 11.96* 8.42   HEMOGLOBIN g/dL 13.8 14.9   PLATELETS 10*3/mm3 213 235     BMP   Results from last 7 days   Lab Units 03/02/25  0434 03/01/25  1216   SODIUM mmol/L 134* 135*   POTASSIUM mmol/L 4.5 4.1   CHLORIDE mmol/L 101 100   CO2 mmol/L 23.9 25.0   BUN mg/dL 7* 6*   CREATININE mg/dL 0.96 0.88   GLUCOSE mg/dL 119* 99   MAGNESIUM mg/dL  --  1.9     CMP   Results from last 7 days   Lab Units 03/02/25  0434 03/01/25  1216   SODIUM mmol/L 134* 135*   POTASSIUM mmol/L 4.5 4.1   CHLORIDE mmol/L 101 100   CO2 mmol/L 23.9 25.0   BUN mg/dL 7* 6*   CREATININE mg/dL 0.96 0.88   GLUCOSE mg/dL 119* 99   ALBUMIN g/dL  --  4.2   BILIRUBIN mg/dL  --  0.4   ALK PHOS U/L  --  72   AST (SGOT) U/L  --  75*   ALT (SGPT) U/L  --  33     Cardiac Studies:  Echo- Results for orders placed during the hospital encounter of 03/01/25    Adult Transthoracic Echo Complete W/ Cont if Necessary Per Protocol    Interpretation Summary    Left ventricular systolic function is normal. Calculated left ventricular 3D EF = 55% Left ventricular ejection fraction appears to be 56 - 60%.    Left ventricular diastolic function was normal.    Moderate aortic valve regurgitation is present.    Estimated right ventricular systolic pressure from tricuspid regurgitation is mildly elevated (35-45  mmHg).    Stress Myoview-  Cath-      Medication Review:   Scheduled Meds:aspirin, 81 mg, Oral, Daily  atorvastatin, 80 mg, Oral, Daily  [START ON 3/4/2025] clopidogrel, 75 mg, Oral, Daily  isosorbide mononitrate, 30 mg, Oral, Q24H  metoprolol succinate XL, 25 mg, Oral, Q24H  mupirocin, 1 Application, Each Nare, BID      Continuous Infusions:   PRN Meds:.  acetaminophen    ALPRAZolam    aluminum-magnesium hydroxide-simethicone    atropine    diphenhydrAMINE    nitroglycerin    ondansetron ODT **OR** ondansetron    sodium chloride    traZODone      Assessment & Plan     Acute inferior myocardial infarction    Acute ST elevation myocardial infarction (STEMI) of inferior wall    MDM:    1.  Acute ST elevation myocardial infarction inferior wall:    Patient underwent urgent cardiac catheterization and stenting of PDA through SVG graft to PDA was done.  Patient is on aspirin. Patient could not afford Brilinta, switch to Plavix.  Cont lipitor.     2.  CAD/CABG:    Patient has left main disease extending into LCx.  It is technically challenging lesion.  Patient would need stenting of this vessel, high grade stenosis.  Patient anxious to go home and agreeable come back.  Patient states he has been ambulating with no symptoms. Plan to d/c and return 3/13 as outpt for intervention.    3.  Chest pain:    Patient is free of chest pain today, on Imdur    4.  Hyperlipidemia:    Continue Lipitor    Discussed with patient process for outpatient cardiac cath, scheduling will contact him.  Patient and wife voiced understanding and agreement with plan.  Okay to discharge from cardiac standpoint      Patient is seen and examined and findings are verified.  All data is reviewed by me personally.  Assessment and plan formulated by APC was done after discussion with attending.  I spent more than 50% of time in taking care of the patient.    Patient is laying in the bed.  He was able to walk and did not have any chest pain.    Hemodynamics  are stable    Normal S1 and S2.  No pericardial rub or murmur abdominal exam is benign    Patient wants to be discharged.  Intervention of left main/circumflex artery would be done as a outpatient.  Continue DAPT.    Electronically signed by Dexter Gaitan MD, 03/03/25, 4:53 PM EST.      Dexter Gaitan MD  03/03/25  16:53 EST

## 2025-03-03 NOTE — DISCHARGE SUMMARY
"             [facility] Medicine Services  Discharge Summary    Date of Service: 3/3/2025  Patient Name: Toy Shannon  : 1955  MRN: [id number]    Date of Admission: 3/1/2025  Discharge Diagnosis: Acute inferior myocardial infarction  Date of Discharge: 3/3/2025  Primary Care Physician: Anna Proter APRN      Presenting Problem:   ST elevation myocardial infarction (STEMI), unspecified artery [I21.3]  Acute inferior myocardial infarction [I21.19]  Acute ST elevation myocardial infarction (STEMI) of inferior wall [I21.19]    Active and Resolved Hospital Problems:  Active Hospital Problems    Diagnosis POA    **Acute inferior myocardial infarction [I21.19] Yes    Acute ST elevation myocardial infarction (STEMI) of inferior wall [I21.19] Yes      Resolved Hospital Problems   No resolved problems to display.         Hospital Course     HPI:    \"69-year-old male states he woke up with some pressure in his chest and a dull ache. He reports no radiating pain or diaphoresis or palpitation. He states he has had some mild associated nausea. States the pain has eased somewhat from peak of intensity. States he took an anxiolytic as well as some ibuprofen prior to arrival.     EKG in ER showed acute inferior STEMI.  Cardiology was consulted and pt was taken emergently to the cath lab where he underwent PTCA and stenting to SVG graft to PDA.  Pt admitted to CVU for observation.\"    Hospital Course:    Patient was hospitalized for the management of acute inferior STEMI.  Patient was taken to the Cath Lab and underwent PTCA and stenting of SVG graft to PDA.  Did very well postoperatively and after medical optimization was able to discharge 3/3/2025 with DAPT.  Plan to return on outpatient basis for serial intervention.        DISCHARGE Follow Up Recommendations for labs and diagnostics:     Cardiology, PCP.  To return next week for additional stenting        Day of Discharge     Vital Signs:  Temp:  [97.8 °F (36.6 " °C)-98.1 °F (36.7 °C)] 97.8 °F (36.6 °C)  Heart Rate:  [57-77] 70  Resp:  [12-19] 16  BP: ()/(42-71) 123/53    Physical Exam:  Physical Exam  Constitutional:       Appearance: Normal appearance.   Cardiovascular:      Rate and Rhythm: Normal rate and regular rhythm.      Pulses: Normal pulses.      Heart sounds: Normal heart sounds.   Pulmonary:      Effort: Pulmonary effort is normal.      Breath sounds: Normal breath sounds.   Abdominal:      General: Abdomen is flat.      Palpations: Abdomen is soft.   Neurological:      Mental Status: He is alert.            Pertinent  and/or Most Recent Results     LAB RESULTS:      Lab 03/02/25  0434 03/01/25  1216   WBC 11.96* 8.42   HEMOGLOBIN 13.8 14.9   HEMATOCRIT 40.9 44.1   PLATELETS 213 235   NEUTROS ABS  --  4.79   IMMATURE GRANS (ABS)  --  0.03   LYMPHS ABS  --  2.43   MONOS ABS  --  0.96*   EOS ABS  --  0.14   MCV 86.7 87.3   PROTIME  --  13.2         Lab 03/02/25  0434 03/01/25  1216   SODIUM 134* 135*   POTASSIUM 4.5 4.1   CHLORIDE 101 100   CO2 23.9 25.0   ANION GAP 9.1 10.0   BUN 7* 6*   CREATININE 0.96 0.88   EGFR 84.5 91.9   GLUCOSE 119* 99   CALCIUM 8.8 9.0   MAGNESIUM  --  1.9   HEMOGLOBIN A1C  --  5.57         Lab 03/01/25  1216   TOTAL PROTEIN 6.8   ALBUMIN 4.2   GLOBULIN 2.6   ALT (SGPT) 33   AST (SGOT) 75*   BILIRUBIN 0.4   ALK PHOS 72         Lab 03/02/25  1646 03/02/25  0434 03/01/25  1216   HSTROP T 1,072* 1,495* 285*   PROTIME  --   --  13.2   INR  --   --  1.01         Lab 03/02/25  0434   CHOLESTEROL 132   LDL CHOL 73   HDL CHOL 43   TRIGLYCERIDES 81             Brief Urine Lab Results       None          Microbiology Results (last 10 days)       ** No results found for the last 240 hours. **            XR Chest 1 View    Result Date: 3/1/2025  Impression: Impression: 1.Cardiomegaly. 2.No active pulmonary disease. Electronically Signed: Rohit Brown MD  3/1/2025 12:52 PM EST  Workstation ID: [id number]             Results for orders placed during  the hospital encounter of 03/01/25    Adult Transthoracic Echo Complete W/ Cont if Necessary Per Protocol    Interpretation Summary    Left ventricular systolic function is normal. Calculated left ventricular 3D EF = 55% Left ventricular ejection fraction appears to be 56 - 60%.    Left ventricular diastolic function was normal.    Moderate aortic valve regurgitation is present.    Estimated right ventricular systolic pressure from tricuspid regurgitation is mildly elevated (35-45 mmHg).      Labs Pending at Discharge:  Pending Results       None            Procedures Performed  Procedure(s):  Left Heart Cath  Percutaneous Coronary Intervention         Consults:   Consults       Date and Time Order Name Status Description    3/2/2025  8:44 AM Inpatient Hospitalist Consult Completed     3/1/2025 12:16 PM Consult Interventional Cardiology and Notify Cath Lab Completed               Discharge Details        Discharge Medications        New Medications        Instructions Start Date   clopidogrel 75 MG tablet  Commonly known as: PLAVIX   75 mg, Oral, Daily   Start Date: March 4, 2025     isosorbide mononitrate 30 MG 24 hr tablet  Commonly known as: IMDUR   30 mg, Oral, Every 24 Hours Scheduled   Start Date: March 4, 2025     metoprolol succinate XL 25 MG 24 hr tablet  Commonly known as: TOPROL-XL   25 mg, Oral, Every 24 Hours Scheduled   Start Date: March 4, 2025            Changes to Medications        Instructions Start Date   atorvastatin 80 MG tablet  Commonly known as: LIPITOR   80 mg, Oral, Daily      ondansetron ODT 4 MG disintegrating tablet  Commonly known as: ZOFRAN-ODT  What changed: Another medication with the same name was added. Make sure you understand how and when to take each.   4 mg, Translingual, Every 8 Hours PRN      ondansetron ODT 4 MG disintegrating tablet  Commonly known as: ZOFRAN-ODT  What changed: You were already taking a medication with the same name, and this prescription was added. Make  sure you understand how and when to take each.   4 mg, Oral, Every 6 Hours PRN             Continue These Medications        Instructions Start Date   ALPRAZolam 0.5 MG tablet  Commonly known as: XANAX   0.5 mg, Oral, 4 Times Daily PRN      aspirin 81 MG chewable tablet   81 mg, Oral, Daily   Start Date: March 4, 2025     coenzyme Q10 100 MG capsule   100 mg, Oral, Daily      fish oil 1200 MG capsule capsule   1,200 mg, Oral, Daily      multivitamin with minerals tablet tablet   Oral, Daily      rizatriptan 10 MG tablet  Commonly known as: MAXALT   TAKE ONE TABLET BY MOUTH AT ONSET OF HEADACHE; MAY REPEAT ONE TABLET IN 2 HOURS IF NEEDED.      traZODone 100 MG tablet  Commonly known as: DESYREL   100 mg, Oral, Every Night at Bedtime      Triamcinolone Acetonide 55 MCG/ACT nasal inhaler  Commonly known as: NASACORT   2 sprays, Nasal, As Needed      Turmeric Curcumin 500 MG capsule   1 capsule, Oral, Daily - RT               No Known Allergies      Discharge Disposition:   Home or Self Care    Diet:  Hospital:  Diet Order   Procedures    Diet: Cardiac; Healthy Heart (2-3 Na+); Fluid Consistency: Thin (IDDSI 0)         Discharge Activity:         CODE STATUS:  Code Status and Medical Interventions: CPR (Attempt to Resuscitate); Full Support   Ordered at: 03/01/25 1443     Level Of Support Discussed With:    Patient     Code Status (Patient has no pulse and is not breathing):    CPR (Attempt to Resuscitate)     Medical Interventions (Patient has pulse or is breathing):    Full Support         No future appointments.        Time spent on Discharge including face to face service:  55 minutes    Signature: Electronically signed by Rocky Teague MD, 03/03/25, 15:01 EST.  [facility] Hospitalist Team

## 2025-03-03 NOTE — PLAN OF CARE
Goal Outcome Evaluation:      Patient will be going home with his wife. Both he and his wife were educated on the importance of calling 911 for an ambulance if he experiences any symptoms of a heart attack. Patient and wife also educated on the signs and symptoms of a heart attacks. Both indicated understanding. Dr. Gaitan's office will reach out to the patient to schedule the second cath, but the patient and his wife both know to call if they haven't heard from them soon.

## 2025-03-04 DIAGNOSIS — I25.10 CORONARY ARTERY DISEASE, UNSPECIFIED VESSEL OR LESION TYPE, UNSPECIFIED WHETHER ANGINA PRESENT, UNSPECIFIED WHETHER NATIVE OR TRANSPLANTED HEART: Primary | ICD-10-CM

## 2025-03-04 NOTE — OUTREACH NOTE
Prep Survey      Flowsheet Row Responses   [facility] patient discharged from? [facility]   Is LACE score < 7 ? No   Eligibility Readm Mgmt   Discharge diagnosis Acute inferior myocardial infarction, heart cath with stent to CABG graft   Does the patient have one of the following disease processes/diagnoses(primary or secondary)? Acute MI (STEMI,NSTEMI)   Does the patient have Home health ordered? No   Is there a DME ordered? No   Prep survey completed? Yes            Bernice Giron Registered Nurse

## 2025-03-05 LAB
QT INTERVAL: 369 MS
QTC INTERVAL: 414 MS

## 2025-03-11 ENCOUNTER — LAB (OUTPATIENT)
Dept: LAB | Facility: HOSPITAL | Age: 70
End: 2025-03-11
Payer: MEDICARE

## 2025-03-11 DIAGNOSIS — I25.10 CORONARY ARTERY DISEASE, UNSPECIFIED VESSEL OR LESION TYPE, UNSPECIFIED WHETHER ANGINA PRESENT, UNSPECIFIED WHETHER NATIVE OR TRANSPLANTED HEART: ICD-10-CM

## 2025-03-11 LAB
ANION GAP SERPL CALCULATED.3IONS-SCNC: 12.1 MMOL/L (ref 5–15)
BASOPHILS # BLD AUTO: 0.05 10*3/MM3 (ref 0–0.2)
BASOPHILS NFR BLD AUTO: 0.7 % (ref 0–1.5)
BUN SERPL-MCNC: 7 MG/DL (ref 8–23)
BUN/CREAT SERPL: 6.6 (ref 7–25)
CALCIUM SPEC-SCNC: 9.4 MG/DL (ref 8.6–10.5)
CHLORIDE SERPL-SCNC: 94 MMOL/L (ref 98–107)
CO2 SERPL-SCNC: 27.9 MMOL/L (ref 22–29)
CREAT SERPL-MCNC: 1.06 MG/DL (ref 0.76–1.27)
DEPRECATED RDW RBC AUTO: 38.2 FL (ref 37–54)
EGFRCR SERPLBLD CKD-EPI 2021: 75 ML/MIN/1.73
EOSINOPHIL # BLD AUTO: 0.16 10*3/MM3 (ref 0–0.4)
EOSINOPHIL NFR BLD AUTO: 2.2 % (ref 0.3–6.2)
ERYTHROCYTE [DISTWIDTH] IN BLOOD BY AUTOMATED COUNT: 12 % (ref 12.3–15.4)
GLUCOSE SERPL-MCNC: 92 MG/DL (ref 65–99)
HCT VFR BLD AUTO: 41.2 % (ref 37.5–51)
HGB BLD-MCNC: 14 G/DL (ref 13–17.7)
IMM GRANULOCYTES # BLD AUTO: 0.03 10*3/MM3 (ref 0–0.05)
IMM GRANULOCYTES NFR BLD AUTO: 0.4 % (ref 0–0.5)
INR PPP: 1.04 (ref 0.9–1.1)
LYMPHOCYTES # BLD AUTO: 1.88 10*3/MM3 (ref 0.7–3.1)
LYMPHOCYTES NFR BLD AUTO: 26.2 % (ref 19.6–45.3)
MCH RBC QN AUTO: 29.8 PG (ref 26.6–33)
MCHC RBC AUTO-ENTMCNC: 34 G/DL (ref 31.5–35.7)
MCV RBC AUTO: 87.7 FL (ref 79–97)
MONOCYTES # BLD AUTO: 0.92 10*3/MM3 (ref 0.1–0.9)
MONOCYTES NFR BLD AUTO: 12.8 % (ref 5–12)
NEUTROPHILS NFR BLD AUTO: 4.13 10*3/MM3 (ref 1.7–7)
NEUTROPHILS NFR BLD AUTO: 57.7 % (ref 42.7–76)
NRBC BLD AUTO-RTO: 0 /100 WBC (ref 0–0.2)
PLATELET # BLD AUTO: 300 10*3/MM3 (ref 140–450)
PMV BLD AUTO: 10.2 FL (ref 6–12)
POTASSIUM SERPL-SCNC: 4.5 MMOL/L (ref 3.5–5.2)
PROTHROMBIN TIME: 13.5 SECONDS (ref 11.7–14.2)
RBC # BLD AUTO: 4.7 10*6/MM3 (ref 4.14–5.8)
SODIUM SERPL-SCNC: 134 MMOL/L (ref 136–145)
WBC NRBC COR # BLD AUTO: 7.17 10*3/MM3 (ref 3.4–10.8)

## 2025-03-11 PROCEDURE — 80048 BASIC METABOLIC PNL TOTAL CA: CPT

## 2025-03-11 PROCEDURE — 36415 COLL VENOUS BLD VENIPUNCTURE: CPT

## 2025-03-11 PROCEDURE — 85610 PROTHROMBIN TIME: CPT

## 2025-03-11 PROCEDURE — 85025 COMPLETE CBC W/AUTO DIFF WBC: CPT

## 2025-03-13 ENCOUNTER — READMISSION MANAGEMENT (OUTPATIENT)
Dept: CALL CENTER | Facility: HOSPITAL | Age: 70
End: 2025-03-13
Payer: MEDICARE

## 2025-03-13 ENCOUNTER — HOSPITAL ENCOUNTER (OUTPATIENT)
Facility: HOSPITAL | Age: 70
Discharge: HOME | End: 2025-03-14
Attending: INTERNAL MEDICINE | Admitting: INTERNAL MEDICINE
Payer: MEDICARE

## 2025-03-13 DIAGNOSIS — I25.10 CORONARY ARTERY DISEASE, UNSPECIFIED VESSEL OR LESION TYPE, UNSPECIFIED WHETHER ANGINA PRESENT, UNSPECIFIED WHETHER NATIVE OR TRANSPLANTED HEART: ICD-10-CM

## 2025-03-13 LAB
ACT BLD: 158 SECONDS (ref 89–137)
ACT BLD: 268 SECONDS (ref 89–137)

## 2025-03-13 PROCEDURE — C1725 CATH, TRANSLUMIN NON-LASER: HCPCS | Performed by: INTERNAL MEDICINE

## 2025-03-13 PROCEDURE — 85347 COAGULATION TIME ACTIVATED: CPT

## 2025-03-13 PROCEDURE — C1887 CATHETER, GUIDING: HCPCS | Performed by: INTERNAL MEDICINE

## 2025-03-13 PROCEDURE — 25010000002 HEPARIN (PORCINE) PER 1000 UNITS: Performed by: INTERNAL MEDICINE

## 2025-03-13 PROCEDURE — C1894 INTRO/SHEATH, NON-LASER: HCPCS | Performed by: INTERNAL MEDICINE

## 2025-03-13 PROCEDURE — C1769 GUIDE WIRE: HCPCS | Performed by: INTERNAL MEDICINE

## 2025-03-13 PROCEDURE — 25010000002 LIDOCAINE 1 % SOLUTION: Performed by: INTERNAL MEDICINE

## 2025-03-13 PROCEDURE — G0378 HOSPITAL OBSERVATION PER HR: HCPCS

## 2025-03-13 PROCEDURE — 92972 PERQ TRLUML CORONRY LITHOTRP: CPT | Performed by: INTERNAL MEDICINE

## 2025-03-13 PROCEDURE — A9270 NON-COVERED ITEM OR SERVICE: HCPCS | Performed by: INTERNAL MEDICINE

## 2025-03-13 PROCEDURE — 63710000001 ONDANSETRON ODT 4 MG TABLET DISPERSIBLE: Performed by: INTERNAL MEDICINE

## 2025-03-13 PROCEDURE — C1761: HCPCS | Performed by: INTERNAL MEDICINE

## 2025-03-13 PROCEDURE — 25010000002 FENTANYL CITRATE (PF) 100 MCG/2ML SOLUTION: Performed by: INTERNAL MEDICINE

## 2025-03-13 PROCEDURE — 63710000001 ALPRAZOLAM 0.5 MG TABLET: Performed by: INTERNAL MEDICINE

## 2025-03-13 PROCEDURE — C9600 PERC DRUG-EL COR STENT SING: HCPCS | Performed by: INTERNAL MEDICINE

## 2025-03-13 PROCEDURE — 25010000002 MIDAZOLAM PER 1 MG: Performed by: INTERNAL MEDICINE

## 2025-03-13 PROCEDURE — 63710000001 ACETAMINOPHEN 325 MG TABLET: Performed by: INTERNAL MEDICINE

## 2025-03-13 PROCEDURE — 25810000003 SODIUM CHLORIDE 0.9 % SOLUTION: Performed by: INTERNAL MEDICINE

## 2025-03-13 PROCEDURE — C1874 STENT, COATED/COV W/DEL SYS: HCPCS | Performed by: INTERNAL MEDICINE

## 2025-03-13 PROCEDURE — 63710000001 ALPRAZOLAM 0.25 MG TABLET: Performed by: INTERNAL MEDICINE

## 2025-03-13 PROCEDURE — 63710000001 TRAZODONE 100 MG TABLET: Performed by: INTERNAL MEDICINE

## 2025-03-13 PROCEDURE — 92928 PRQ TCAT PLMT NTRAC ST 1 LES: CPT | Performed by: INTERNAL MEDICINE

## 2025-03-13 PROCEDURE — 25510000001 IOPAMIDOL PER 1 ML: Performed by: INTERNAL MEDICINE

## 2025-03-13 DEVICE — XIENCE SKYPOINT™ EVEROLIMUS ELUTING CORONARY STENT SYSTEM 4.00 MM X 18 MM / RAPID-EXCHANGE
Type: IMPLANTABLE DEVICE | Status: FUNCTIONAL
Brand: XIENCE SKYPOINT™

## 2025-03-13 RX ORDER — CLOPIDOGREL 300 MG/1
TABLET, FILM COATED ORAL
Status: DISCONTINUED | OUTPATIENT
Start: 2025-03-13 | End: 2025-03-13 | Stop reason: HOSPADM

## 2025-03-13 RX ORDER — ALPRAZOLAM 0.5 MG
0.5 TABLET ORAL
Status: DISCONTINUED | OUTPATIENT
Start: 2025-03-13 | End: 2025-03-14 | Stop reason: HOSPADM

## 2025-03-13 RX ORDER — NITROGLYCERIN 0.4 MG/1
0.4 TABLET SUBLINGUAL
Status: DISCONTINUED | OUTPATIENT
Start: 2025-03-13 | End: 2025-03-14 | Stop reason: HOSPADM

## 2025-03-13 RX ORDER — METOPROLOL SUCCINATE 25 MG/1
25 TABLET, EXTENDED RELEASE ORAL
Status: DISCONTINUED | OUTPATIENT
Start: 2025-03-14 | End: 2025-03-14 | Stop reason: HOSPADM

## 2025-03-13 RX ORDER — CLOPIDOGREL BISULFATE 75 MG/1
75 TABLET ORAL DAILY
Status: DISCONTINUED | OUTPATIENT
Start: 2025-03-14 | End: 2025-03-14 | Stop reason: HOSPADM

## 2025-03-13 RX ORDER — TRAZODONE HYDROCHLORIDE 100 MG/1
100 TABLET ORAL NIGHTLY
Status: DISCONTINUED | OUTPATIENT
Start: 2025-03-13 | End: 2025-03-14 | Stop reason: HOSPADM

## 2025-03-13 RX ORDER — LIDOCAINE HYDROCHLORIDE 10 MG/ML
INJECTION, SOLUTION INFILTRATION; PERINEURAL
Status: DISCONTINUED | OUTPATIENT
Start: 2025-03-13 | End: 2025-03-13 | Stop reason: HOSPADM

## 2025-03-13 RX ORDER — ISOSORBIDE MONONITRATE 30 MG/1
30 TABLET, EXTENDED RELEASE ORAL
Status: DISCONTINUED | OUTPATIENT
Start: 2025-03-14 | End: 2025-03-14 | Stop reason: HOSPADM

## 2025-03-13 RX ORDER — FENTANYL CITRATE 50 UG/ML
INJECTION, SOLUTION INTRAMUSCULAR; INTRAVENOUS
Status: DISCONTINUED | OUTPATIENT
Start: 2025-03-13 | End: 2025-03-13 | Stop reason: HOSPADM

## 2025-03-13 RX ORDER — MIDAZOLAM HYDROCHLORIDE 1 MG/ML
INJECTION, SOLUTION INTRAMUSCULAR; INTRAVENOUS
Status: DISCONTINUED | OUTPATIENT
Start: 2025-03-13 | End: 2025-03-13 | Stop reason: HOSPADM

## 2025-03-13 RX ORDER — ALPRAZOLAM 0.25 MG
0.25 TABLET ORAL 2 TIMES DAILY PRN
Status: DISCONTINUED | OUTPATIENT
Start: 2025-03-13 | End: 2025-03-13

## 2025-03-13 RX ORDER — IOPAMIDOL 755 MG/ML
INJECTION, SOLUTION INTRAVASCULAR
Status: DISCONTINUED | OUTPATIENT
Start: 2025-03-13 | End: 2025-03-13 | Stop reason: HOSPADM

## 2025-03-13 RX ORDER — ASPIRIN 81 MG/1
81 TABLET, CHEWABLE ORAL DAILY
Status: DISCONTINUED | OUTPATIENT
Start: 2025-03-14 | End: 2025-03-14 | Stop reason: HOSPADM

## 2025-03-13 RX ORDER — ATORVASTATIN CALCIUM 40 MG/1
80 TABLET, FILM COATED ORAL DAILY
Status: DISCONTINUED | OUTPATIENT
Start: 2025-03-14 | End: 2025-03-14 | Stop reason: HOSPADM

## 2025-03-13 RX ORDER — ACETAMINOPHEN 325 MG/1
650 TABLET ORAL EVERY 4 HOURS PRN
Status: DISCONTINUED | OUTPATIENT
Start: 2025-03-13 | End: 2025-03-14 | Stop reason: HOSPADM

## 2025-03-13 RX ORDER — ONDANSETRON 2 MG/ML
4 INJECTION INTRAMUSCULAR; INTRAVENOUS EVERY 6 HOURS PRN
Status: DISCONTINUED | OUTPATIENT
Start: 2025-03-13 | End: 2025-03-14 | Stop reason: HOSPADM

## 2025-03-13 RX ORDER — ONDANSETRON 4 MG/1
4 TABLET, ORALLY DISINTEGRATING ORAL EVERY 6 HOURS PRN
Status: DISCONTINUED | OUTPATIENT
Start: 2025-03-13 | End: 2025-03-14 | Stop reason: HOSPADM

## 2025-03-13 RX ORDER — SODIUM CHLORIDE 9 MG/ML
INJECTION, SOLUTION INTRAVENOUS
Status: COMPLETED | OUTPATIENT
Start: 2025-03-13 | End: 2025-03-13

## 2025-03-13 RX ORDER — HEPARIN SODIUM 1000 [USP'U]/ML
INJECTION, SOLUTION INTRAVENOUS; SUBCUTANEOUS
Status: DISCONTINUED | OUTPATIENT
Start: 2025-03-13 | End: 2025-03-13 | Stop reason: HOSPADM

## 2025-03-13 RX ADMIN — ACETAMINOPHEN 650 MG: 325 TABLET, FILM COATED ORAL at 13:46

## 2025-03-13 RX ADMIN — ALPRAZOLAM 0.25 MG: 0.25 TABLET ORAL at 13:46

## 2025-03-13 RX ADMIN — ONDANSETRON 4 MG: 4 TABLET, ORALLY DISINTEGRATING ORAL at 20:06

## 2025-03-13 RX ADMIN — ACETAMINOPHEN 650 MG: 325 TABLET, FILM COATED ORAL at 18:16

## 2025-03-13 RX ADMIN — ONDANSETRON 4 MG: 4 TABLET, ORALLY DISINTEGRATING ORAL at 13:46

## 2025-03-13 RX ADMIN — ALPRAZOLAM 0.5 MG: 0.5 TABLET ORAL at 20:06

## 2025-03-13 RX ADMIN — TRAZODONE HYDROCHLORIDE 100 MG: 100 TABLET ORAL at 20:06

## 2025-03-13 NOTE — Clinical Note
First balloon inflation max pressure = 12 venessa. First balloon inflation duration = 4 seconds. Second inflation of balloon - Max pressure = 12 venessa. 2nd Inflation of balloon - Duration = 12 seconds. Third inflation of balloon - Max pressure = 14 venessa. 3rd Inflation of balloon - Duration = 12 seconds.

## 2025-03-13 NOTE — Clinical Note
Second inflation of balloon - Max pressure = 12 venessa. 2nd Inflation of balloon - Duration = 16 seconds.

## 2025-03-14 ENCOUNTER — TRANSCRIBE ORDERS (OUTPATIENT)
Dept: CARDIAC REHAB | Facility: HOSPITAL | Age: 70
End: 2025-03-14
Payer: MEDICARE

## 2025-03-14 VITALS
OXYGEN SATURATION: 90 % | BODY MASS INDEX: 34.86 KG/M2 | TEMPERATURE: 97.8 F | SYSTOLIC BLOOD PRESSURE: 118 MMHG | HEIGHT: 71 IN | WEIGHT: 249 LBS | DIASTOLIC BLOOD PRESSURE: 63 MMHG | RESPIRATION RATE: 14 BRPM | HEART RATE: 70 BPM

## 2025-03-14 LAB
ANION GAP SERPL CALCULATED.3IONS-SCNC: 8.6 MMOL/L (ref 5–15)
BUN SERPL-MCNC: 8 MG/DL (ref 8–23)
BUN/CREAT SERPL: 8.4 (ref 7–25)
CALCIUM SPEC-SCNC: 8.9 MG/DL (ref 8.6–10.5)
CHLORIDE SERPL-SCNC: 98 MMOL/L (ref 98–107)
CHOLEST SERPL-MCNC: 105 MG/DL (ref 0–200)
CO2 SERPL-SCNC: 25.4 MMOL/L (ref 22–29)
CREAT SERPL-MCNC: 0.95 MG/DL (ref 0.76–1.27)
DEPRECATED RDW RBC AUTO: 37.6 FL (ref 37–54)
EGFRCR SERPLBLD CKD-EPI 2021: 85.6 ML/MIN/1.73
ERYTHROCYTE [DISTWIDTH] IN BLOOD BY AUTOMATED COUNT: 11.9 % (ref 12.3–15.4)
GLUCOSE SERPL-MCNC: 96 MG/DL (ref 65–99)
HCT VFR BLD AUTO: 38.8 % (ref 37.5–51)
HDLC SERPL-MCNC: 41 MG/DL (ref 40–60)
HGB BLD-MCNC: 13.1 G/DL (ref 13–17.7)
LDLC SERPL CALC-MCNC: 47 MG/DL (ref 0–100)
LDLC/HDLC SERPL: 1.13 {RATIO}
MCH RBC QN AUTO: 29.2 PG (ref 26.6–33)
MCHC RBC AUTO-ENTMCNC: 33.8 G/DL (ref 31.5–35.7)
MCV RBC AUTO: 86.6 FL (ref 79–97)
PLATELET # BLD AUTO: 280 10*3/MM3 (ref 140–450)
PMV BLD AUTO: 9.7 FL (ref 6–12)
POTASSIUM SERPL-SCNC: 4.6 MMOL/L (ref 3.5–5.2)
QT INTERVAL: 422 MS
QTC INTERVAL: 459 MS
RBC # BLD AUTO: 4.48 10*6/MM3 (ref 4.14–5.8)
SODIUM SERPL-SCNC: 132 MMOL/L (ref 136–145)
TRIGL SERPL-MCNC: 88 MG/DL (ref 0–150)
VLDLC SERPL-MCNC: 17 MG/DL (ref 5–40)
WBC NRBC COR # BLD AUTO: 9.49 10*3/MM3 (ref 3.4–10.8)

## 2025-03-14 PROCEDURE — 63710000001 METOPROLOL SUCCINATE XL 25 MG TABLET SUSTAINED-RELEASE 24 HOUR: Performed by: INTERNAL MEDICINE

## 2025-03-14 PROCEDURE — 63710000001 ALPRAZOLAM 0.5 MG TABLET: Performed by: INTERNAL MEDICINE

## 2025-03-14 PROCEDURE — A9270 NON-COVERED ITEM OR SERVICE: HCPCS | Performed by: INTERNAL MEDICINE

## 2025-03-14 PROCEDURE — 63710000001 ASPIRIN 81 MG CHEWABLE TABLET: Performed by: INTERNAL MEDICINE

## 2025-03-14 PROCEDURE — 80061 LIPID PANEL: CPT | Performed by: INTERNAL MEDICINE

## 2025-03-14 PROCEDURE — 99239 HOSP IP/OBS DSCHRG MGMT >30: CPT | Performed by: NURSE PRACTITIONER

## 2025-03-14 PROCEDURE — 63710000001 MUPIROCIN 2 % OINTMENT: Performed by: INTERNAL MEDICINE

## 2025-03-14 PROCEDURE — 93005 ELECTROCARDIOGRAM TRACING: CPT | Performed by: INTERNAL MEDICINE

## 2025-03-14 PROCEDURE — 63710000001 CLOPIDOGREL 75 MG TABLET: Performed by: INTERNAL MEDICINE

## 2025-03-14 PROCEDURE — 63710000001 ISOSORBIDE MONONITRATE 30 MG TABLET SUSTAINED-RELEASE 24 HOUR: Performed by: INTERNAL MEDICINE

## 2025-03-14 PROCEDURE — 85027 COMPLETE CBC AUTOMATED: CPT | Performed by: INTERNAL MEDICINE

## 2025-03-14 PROCEDURE — 63710000001 ATORVASTATIN 40 MG TABLET: Performed by: INTERNAL MEDICINE

## 2025-03-14 PROCEDURE — 80048 BASIC METABOLIC PNL TOTAL CA: CPT | Performed by: INTERNAL MEDICINE

## 2025-03-14 RX ORDER — METOPROLOL SUCCINATE 25 MG/1
25 TABLET, EXTENDED RELEASE ORAL
Qty: 90 TABLET | Refills: 3 | Status: SHIPPED | OUTPATIENT
Start: 2025-03-14

## 2025-03-14 RX ORDER — CLOPIDOGREL BISULFATE 75 MG/1
75 TABLET ORAL DAILY
Qty: 90 TABLET | Refills: 3 | Status: SHIPPED | OUTPATIENT
Start: 2025-03-14

## 2025-03-14 RX ORDER — ATORVASTATIN CALCIUM 80 MG/1
80 TABLET, FILM COATED ORAL DAILY
Qty: 90 TABLET | Refills: 3 | Status: SHIPPED | OUTPATIENT
Start: 2025-03-14

## 2025-03-14 RX ADMIN — ATORVASTATIN CALCIUM 80 MG: 40 TABLET, FILM COATED ORAL at 08:36

## 2025-03-14 RX ADMIN — ASPIRIN 81 MG: 81 TABLET, CHEWABLE ORAL at 08:36

## 2025-03-14 RX ADMIN — ALPRAZOLAM 0.5 MG: 0.5 TABLET ORAL at 00:03

## 2025-03-14 RX ADMIN — CLOPIDOGREL BISULFATE 75 MG: 75 TABLET ORAL at 08:36

## 2025-03-14 RX ADMIN — MUPIROCIN 1 APPLICATION: 20 OINTMENT TOPICAL at 08:36

## 2025-03-14 RX ADMIN — ISOSORBIDE MONONITRATE 30 MG: 30 TABLET, EXTENDED RELEASE ORAL at 08:36

## 2025-03-14 RX ADMIN — ALPRAZOLAM 0.5 MG: 0.5 TABLET ORAL at 06:08

## 2025-03-14 RX ADMIN — METOPROLOL SUCCINATE 25 MG: 25 TABLET, EXTENDED RELEASE ORAL at 08:36

## 2025-03-14 NOTE — CASE MANAGEMENT/SOCIAL WORK
Case Management Discharge Note      Final Note: Outpatient in a bed. No needs.         Transportation Services  Private: Car (with family/friend)    Final Discharge Disposition Code: 01 - home or self-care

## 2025-03-14 NOTE — DISCHARGE SUMMARY
r  Date of Discharge:  3/14/2025    Discharge Diagnosis:     Disease  Previous CABG  Status post cardiac catheterization with PCI    Presenting Problem/History of Present Illness    Active Hospital Problems    Diagnosis  POA    **Coronary artery disease [I25.10]  Yes    CAD (coronary artery disease) [I25.10]  Yes      Resolved Hospital Problems   No resolved problems to display.          Consulting Physicians            Hospital Course  Patient presented for elective cardiac catheterization with PCI.  He had recent admission with an acute ST elevation MI there was an inferior wall MI.  He underwent urgent cardiac catheterization and had stenting of the PDA through the saphenous vein graft to the PDA.  He was started on medical 3 treatment with Plavix.    Patient also had left main disease extending into the left circumflex.  He came back to the hospital for elective cardiac catheterization and underwent.  PCI to this vessel of the left main into the circumflex.    Postprocedure there were no complications.  Right groin is soft with no hematoma.  Rhythm and hemodynamics are stable.  He is been ambulating without difficulty.    Patient is stable for discharge home with outpatient follow-up planned    Procedures Performed      Procedure(s):  Left Heart Cath  Percutaneous Coronary Intervention  -------------------       Consults:     Consults       Date and Time Order Name Status Description    3/2/2025  8:44 AM Inpatient Hospitalist Consult Completed     3/1/2025 12:16 PM Consult Interventional Cardiology and Notify Cath Lab Completed             Pertinent Test Results:     cardiac graphics:      ECG:       Echocardiogram: Results for orders placed during the hospital encounter of 03/01/25    Adult Transthoracic Echo Complete W/ Cont if Necessary Per Protocol    Interpretation Summary    Left ventricular systolic function is normal. Calculated left ventricular 3D EF = 55% Left ventricular ejection fraction appears to  "be 56 - 60%.    Left ventricular diastolic function was normal.    Moderate aortic valve regurgitation is present.    Estimated right ventricular systolic pressure from tricuspid regurgitation is mildly elevated (35-45 mmHg).         Stress Test:              LABS      CBC    Results from last 7 days   Lab Units 03/14/25  0248 03/11/25  0850   WBC 10*3/mm3 9.49 7.17   HEMOGLOBIN g/dL 13.1 14.0   PLATELETS 10*3/mm3 280 300     BMP   Results from last 7 days   Lab Units 03/14/25  0248 03/11/25  0850   SODIUM mmol/L 132* 134*   POTASSIUM mmol/L 4.6 4.5   CHLORIDE mmol/L 98 94*   CO2 mmol/L 25.4 27.9   BUN mg/dL 8 7*   CREATININE mg/dL 0.95 1.06   GLUCOSE mg/dL 96 92     Cr Clearance Estimated Creatinine Clearance: 91.2 mL/min (by C-G formula based on SCr of 0.95 mg/dL).  Coag   Results from last 7 days   Lab Units 03/11/25  0850   INR  1.04     HbA1C   Lab Results   Component Value Date    HGBA1C 5.57 03/01/2025     Blood Glucose No results found for: \"POCGLU\"  Infection     CMP   Results from last 7 days   Lab Units 03/14/25  0248 03/11/25  0850   SODIUM mmol/L 132* 134*   POTASSIUM mmol/L 4.6 4.5   CHLORIDE mmol/L 98 94*   CO2 mmol/L 25.4 27.9   BUN mg/dL 8 7*   CREATININE mg/dL 0.95 1.06   GLUCOSE mg/dL 96 92     ABG      UA      WINDY  No results found for: \"POCMETH\", \"POCAMPHET\", \"POCBARBITUR\", \"POCBENZO\", \"POCCOCAINE\", \"POCOPIATES\", \"POCOXYCODO\", \"POCPHENCYC\", \"POCPROPOXY\", \"POCTHC\", \"POCTRICYC\"  [facility]   Results from last 7 days   Lab Units 03/14/25  0248 03/11/25  0850   INR   --  1.04   HEMOGLOBIN g/dL 13.1 14.0   PLATELETS 10*3/mm3 280 300   CREATININE mg/dL 0.95 1.06       Imaging Results (Last 24 Hours)       ** No results found for the last 24 hours. **                      Condition on Discharge: Stable    Vital Signs    Temp:  [97.7 °F (36.5 °C)-98.5 °F (36.9 °C)] 97.8 °F (36.6 °C)  Heart Rate:  [54-78] 78  Resp:  [14-16] 16  BP: (103-162)/(54-78) 162/57    Physical Exam:    Vitals reviewed. "   Constitutional:       General: Not in acute distress.     Appearance: Normal appearance. Well-developed.   Eyes:      Pupils: Pupils are equal, round, and reactive to light.   HENT:      Head: Normocephalic and atraumatic.   Neck:      Vascular: No JVD.   Pulmonary:      Effort: Pulmonary effort is normal.      Breath sounds: Normal breath sounds.   Cardiovascular:      Normal rate. Regular rhythm.      Comments: Right groin soft with no hematoma  Edema:     Peripheral edema absent.   Abdominal:      General: There is no distension.      Palpations: Abdomen is soft.      Tenderness: There is no abdominal tenderness.   Musculoskeletal: Normal range of motion.      Cervical back: Normal range of motion and neck supple. Skin:     General: Skin is warm and dry.   Neurological:      Mental Status: Alert and oriented to person, place, and time.         Discharge Disposition    Home or Self Care    Discharge Medications       Discharge Medications        Changes to Medications        Instructions Start Date   ALPRAZolam 0.5 MG tablet  Commonly known as: XANAX  What changed:   when to take this  reasons to take this   0.5 mg, Oral, 4 Times Daily PRN             Continue These Medications        Instructions Start Date   Aspirin Low Dose 81 MG chewable tablet  Generic drug: aspirin   81 mg, Oral, Daily      atorvastatin 80 MG tablet  Commonly known as: LIPITOR   80 mg, Oral, Daily      clopidogrel 75 MG tablet  Commonly known as: PLAVIX   75 mg, Oral, Daily      coenzyme Q10 100 MG capsule   100 mg, Daily      fish oil 1200 MG capsule capsule   1,200 mg, Daily      metoprolol succinate XL 25 MG 24 hr tablet  Commonly known as: TOPROL-XL   25 mg, Oral, Every 24 Hours Scheduled      multivitamin with minerals tablet tablet   Daily      ondansetron ODT 4 MG disintegrating tablet  Commonly known as: ZOFRAN-ODT   4 mg, Oral, Every 6 Hours PRN      traZODone 100 MG tablet  Commonly known as: DESYREL   100 mg, Every Night at  Bedtime      Triamcinolone Acetonide 55 MCG/ACT nasal inhaler  Commonly known as: NASACORT   2 sprays, As Needed      Turmeric Curcumin 500 MG capsule   1 capsule, Oral, Daily - RT             Stop These Medications      isosorbide mononitrate 30 MG 24 hr tablet  Commonly known as: IMDUR              Discharge Diet:   Diet Instructions       Diet: Cardiac Diets; Healthy Heart (2-3 Na+); Thin (IDDSI 0)      Discharge Diet: Cardiac Diets    Cardiac Diet: Healthy Heart (2-3 Na+)    Fluid Consistency: Thin (IDDSI 0)            Activity at Discharge:   Activity Instructions       Other Activity Instructions      Activity Instructions: No heavy lifting pushing or pulling for 1 week            Follow-up Appointments    Future Appointments   Date Time Provider Department Center   4/15/2025 10:00 AM Judie Hedrick APRN [facility]     Additional Instructions for the Follow-ups that You Need to Schedule       Call MD With Problems / Concerns   As directed      Instructions: Call Dr. Aden office with any problems or questions    Order Comments: Instructions: Call Dr. Aden office with any problems or questions         Discharge Follow-up with Specialty: Cardiology; 1 Month   As directed      Specialty: Cardiology   Follow Up: 1 Month                Test Results Pending at Discharge         HUEY Wheeler  03/14/25  11:09 EDT    Time: Discharge 35 min

## 2025-03-14 NOTE — OUTREACH NOTE
AMI Week 2 Survey      Flowsheet Row Responses   [facility] facility patient discharged from? [facility]   Does the patient have one of the following disease processes/diagnoses(primary or secondary)? Acute MI (STEMI,NSTEMI)   Week 2 attempt successful? No   Unsuccessful attempts Attempt 1   Revoke Readmitted            Bernice ELENA - Registered Nurse

## 2025-03-26 ENCOUNTER — OFFICE VISIT (OUTPATIENT)
Dept: CARDIAC REHAB | Facility: HOSPITAL | Age: 70
End: 2025-03-26
Payer: MEDICARE

## 2025-03-26 DIAGNOSIS — Z95.5 S/P DRUG ELUTING CORONARY STENT PLACEMENT: Primary | ICD-10-CM

## 2025-03-26 PROCEDURE — 93798 PHYS/QHP OP CAR RHAB W/ECG: CPT

## 2025-03-28 ENCOUNTER — TREATMENT (OUTPATIENT)
Dept: CARDIAC REHAB | Facility: HOSPITAL | Age: 70
End: 2025-03-28
Payer: MEDICARE

## 2025-03-28 DIAGNOSIS — Z95.5 S/P DRUG ELUTING CORONARY STENT PLACEMENT: Primary | ICD-10-CM

## 2025-03-28 PROCEDURE — 93798 PHYS/QHP OP CAR RHAB W/ECG: CPT

## 2025-03-31 ENCOUNTER — TREATMENT (OUTPATIENT)
Dept: CARDIAC REHAB | Facility: HOSPITAL | Age: 70
End: 2025-03-31
Payer: MEDICARE

## 2025-03-31 DIAGNOSIS — Z95.5 S/P DRUG ELUTING CORONARY STENT PLACEMENT: Primary | ICD-10-CM

## 2025-03-31 DIAGNOSIS — I21.29 ST ELEVATION MYOCARDIAL INFARCTION (STEMI) INVOLVING OTHER CORONARY ARTERY: ICD-10-CM

## 2025-03-31 PROCEDURE — 93798 PHYS/QHP OP CAR RHAB W/ECG: CPT

## 2025-04-02 ENCOUNTER — TREATMENT (OUTPATIENT)
Dept: CARDIAC REHAB | Facility: HOSPITAL | Age: 70
End: 2025-04-02
Payer: MEDICARE

## 2025-04-02 DIAGNOSIS — Z95.5 S/P DRUG ELUTING CORONARY STENT PLACEMENT: Primary | ICD-10-CM

## 2025-04-02 PROCEDURE — 93798 PHYS/QHP OP CAR RHAB W/ECG: CPT

## 2025-04-04 ENCOUNTER — TREATMENT (OUTPATIENT)
Dept: CARDIAC REHAB | Facility: HOSPITAL | Age: 70
End: 2025-04-04
Payer: MEDICARE

## 2025-04-04 DIAGNOSIS — Z95.5 S/P DRUG ELUTING CORONARY STENT PLACEMENT: Primary | ICD-10-CM

## 2025-04-04 PROCEDURE — 93798 PHYS/QHP OP CAR RHAB W/ECG: CPT

## 2025-04-07 ENCOUNTER — TREATMENT (OUTPATIENT)
Dept: CARDIAC REHAB | Facility: HOSPITAL | Age: 70
End: 2025-04-07
Payer: MEDICARE

## 2025-04-07 DIAGNOSIS — Z95.5 S/P DRUG ELUTING CORONARY STENT PLACEMENT: Primary | ICD-10-CM

## 2025-04-07 PROCEDURE — 93798 PHYS/QHP OP CAR RHAB W/ECG: CPT

## 2025-04-09 ENCOUNTER — TREATMENT (OUTPATIENT)
Dept: CARDIAC REHAB | Facility: HOSPITAL | Age: 70
End: 2025-04-09
Payer: MEDICARE

## 2025-04-09 DIAGNOSIS — Z95.5 S/P DRUG ELUTING CORONARY STENT PLACEMENT: Primary | ICD-10-CM

## 2025-04-09 PROCEDURE — 93798 PHYS/QHP OP CAR RHAB W/ECG: CPT

## 2025-04-09 NOTE — PROGRESS NOTES
See Scanned session report   Imiquimod Counseling:  I discussed with the patient the risks of imiquimod including but not limited to erythema, scaling, itching, weeping, crusting, and pain.  Patient understands that the inflammatory response to imiquimod is variable from person to person and was educated regarded proper titration schedule.  If flu-like symptoms develop, patient knows to discontinue the medication and contact us.

## 2025-04-11 ENCOUNTER — APPOINTMENT (OUTPATIENT)
Dept: CARDIAC REHAB | Facility: HOSPITAL | Age: 70
End: 2025-04-11
Payer: MEDICARE

## 2025-04-11 NOTE — PROGRESS NOTES
Cardiology Office Follow Up Visit      Primary Care Provider:  Anna Porter APRN    Reason for f/u:     Hospital follow-up      Subjective     CC:    Follow-up status post cardiac cath with stenting    History of Present Illness       Toy Shannon is a 70 y.o. male. Patient past medical history is significant for hypertension, hyperlipidemia, CAD, 2V CABG, who was admitted 3/1/25 with acute inferior ST elevation myocardial infarction.     In 2011, patient was diagnosed with two-vessel coronary artery disease and underwent CABG x 2 with LIMA to LAD and SVG graft to the PDA and PLV.  Patient has not seen any cardiologist from last 6 years.     On 3/1/25 patient woke up with the chest pain.  Patient described this as a dull ache.  Patient was short of breath diaphoretic. EKG in the emergency room showed acute inferior ST elevation myocardial infarction with reciprocal changes. He underwent urgent cardiac catheterization and had stenting of the PDA through the saphenous vein graft to the PDA.  He was started on medical treatment with Plavix.     Patient also had left main disease extending into the left circumflex.  He came back to the hospital 3/13/25 for elective cardiac catheterization and underwent PCI to this vessel of the left main into the circumflex.   Postprocedure there were no complications.  .    Patient returns today for hospital follow-up.  He states he is doing very well, no complaints today.  He states he has been going to cardiac rehab 3 times a week.  He wants to start also going to [facility] with his wife.  Patient states he has run some marathons previously and really enjoys jogging, inquiring about resuming this.  I advised him to work with the physical therapist at cardiac rehab to develop a plan to make some incremental goals to gradually return to jogging.  Right groin site soft to palpation.  Patient denies chest pain, FRAIRE, orthopnea, PND, Palpitations, edema, abnormal bleeding,  fatigue/weakness, dizziness/diaphoresis/near syncope/syncope.  Patient will continue current therapeutic regimen.  Blood pressure in office today 154/76, patient states at home running 120/50.  Upon review of other vitals available in EMR it other appointments, running high 110s over mid 50-mid 60.  Advised him to continue monitoring at home and notify us if it increases.    ASSESSMENT/PLAN:      Diagnoses and all orders for this visit:    1. Coronary artery disease involving native coronary artery of native heart without angina pectoris (Primary)    2. Hypertension, unspecified type    3. Sleep apnea, unspecified type        MEDICAL DECISION MAKIN.  Status post stenting in 2025.  Continue baby aspirin, Lipitor 80 mg, Toprol-XL 25 mg daily and Plavix.  Patient also takes co-Q10    2.  Home blood pressure log 120/50.  Continue Toprol-XL 25 mg daily    3. Continue CPAP therapy      Past Medical History:   Diagnosis Date    Abnormal ECG Not sure    This needs clarification    Anxiety     ASCVD     CAD     CHOL     Headache     Hypertension 2018    Hypogonadism     RADHA/ CPAP     ------Dr. Garcia    Panic attack     [facility]---Dr Munguia    PSA     = 1.07/ =1.39/ =1.39/ = 2.00       Past Surgical History:   Procedure Laterality Date    CARDIAC CATHETERIZATION          CARDIAC CATHETERIZATION N/A 3/1/2025    Procedure: Left Heart Cath;  Surgeon: Dexter Gaitan MD;  Location: [facility] CATH INVASIVE LOCATION;  Service: Cardiovascular;  Laterality: N/A;    CARDIAC CATHETERIZATION Right 3/1/2025    Procedure: Percutaneous Coronary Intervention;  Surgeon: Dexter Gaitan MD;  Location: [facility] CATH INVASIVE LOCATION;  Service: Cardiovascular;  Laterality: Right;  SVG (RCA) X2    CARDIAC CATHETERIZATION N/A 3/13/2025    Procedure: Left Heart Cath;  Surgeon: Dexter Gaitan MD;  Location: [facility] CATH INVASIVE LOCATION;  Service: Cardiovascular;  Laterality: N/A;    CARDIAC CATHETERIZATION N/A  3/13/2025    Procedure: Percutaneous Coronary Intervention;  Surgeon: Dexter Gaitan MD;  Location: [facility] INVASIVE LOCATION;  Service: Cardiovascular;  Laterality: N/A;    COLONOSCOPY      COLOGUARD---NEG = 2019, rech 2022    CORONARY ARTERY BYPASS GRAFT  2011    X 2    Dr. Ayers ([facility])    VASECTOMY           Current Outpatient Medications:     ALPRAZolam (XANAX) 0.5 MG tablet, Take 1 tablet by mouth 4 (Four) Times a Day As Needed., Disp: , Rfl:     aspirin 81 MG chewable tablet, Chew 1 tablet Daily., Disp: 90 tablet, Rfl: 0    atorvastatin (LIPITOR) 80 MG tablet, Take 1 tablet by mouth Daily., Disp: 90 tablet, Rfl: 3    clopidogrel (PLAVIX) 75 MG tablet, Take 1 tablet by mouth Daily., Disp: 90 tablet, Rfl: 3    coenzyme Q10 100 MG capsule, Take 1 capsule by mouth Daily., Disp: , Rfl:     metoprolol succinate XL (TOPROL-XL) 25 MG 24 hr tablet, Take 1 tablet by mouth Daily., Disp: 90 tablet, Rfl: 3    Multiple Vitamins-Minerals (MULTIVITAMIN ADULTS PO), Take  by mouth Daily., Disp: , Rfl:     Omega-3 Fatty Acids (FISH OIL) 1200 MG capsule capsule, Take 1 capsule by mouth Daily., Disp: , Rfl:     ondansetron ODT (ZOFRAN-ODT) 4 MG disintegrating tablet, Take 1 tablet by mouth Every 6 (Six) Hours As Needed for Nausea or Vomiting., Disp: 20 tablet, Rfl: 0    traZODone (DESYREL) 100 MG tablet, Take 1 tablet by mouth every night at bedtime., Disp: , Rfl:     Triamcinolone Acetonide (NASACORT) 55 MCG/ACT nasal inhaler, Administer 2 sprays into the nostril(s) as directed by provider As Needed for Rhinitis., Disp: , Rfl:     Turmeric Curcumin 500 MG capsule, Take 1 capsule by mouth Daily., Disp: , Rfl:     Current Facility-Administered Medications:     Testosterone Cypionate (DEPOTESTOTERONE CYPIONATE) injection 200 mg, 200 mg, Intramuscular, Q14 Days, Stroot, Melina, DO, 200 mg at 02/03/23 0913    Social History     Socioeconomic History    Marital status:    Tobacco Use    Smoking status:  Never    Smokeless tobacco: Never    Tobacco comments:     Smoked sporadically during teen years, none since.   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    Drug use: Not Currently     Types: Marijuana     Comment: Marijuana use early 20s.    Sexual activity: Yes     Partners: Female     Birth control/protection: Surgical     Comment: Vasectomy       Family History   Problem Relation Age of Onset    Hypertension Mother         Responded to medication, lived to be 91.    Parkinsonism Mother     Anxiety disorder Mother     Dementia Mother     Heart disease Father         Early 60s    Heart attack Father          1967    Parkinsonism Sister     Dementia Sister     Diabetes Sister             Liver disease Sister         fatty liver    No Known Problems Brother        The following portions of the patient's history were reviewed and updated as appropriate: allergies, current medications, past family history, past medical history, past social history, past surgical history and problem list.    Review of Systems   Constitutional: Negative for diaphoresis and malaise/fatigue.   HENT:  Negative for nosebleeds.    Cardiovascular:  Negative for chest pain, dyspnea on exertion, irregular heartbeat, leg swelling, near-syncope, orthopnea, palpitations, paroxysmal nocturnal dyspnea and syncope.   Respiratory:  Negative for cough, hemoptysis, shortness of breath and sleep disturbances due to breathing.    Skin:  Negative for color change.   Musculoskeletal:  Negative for myalgias.   Gastrointestinal:  Negative for abdominal pain, hematemesis, hematochezia and melena.   Genitourinary:  Negative for dysuria and hematuria.   Neurological:  Negative for dizziness, focal weakness, headaches, light-headedness, loss of balance, numbness, paresthesias, vertigo and weakness.   Psychiatric/Behavioral:  Negative  "for altered mental status.    All other systems reviewed and are negative.      Pertinent items are noted in HPI, all other systems reviewed and negative    /76 (BP Location: Right arm, Patient Position: Sitting, Cuff Size: Large Adult)   Pulse 66   Resp 16   Ht 180.3 cm (71\")   Wt 115 kg (254 lb)   SpO2 95%   BMI 35.43 kg/m² .  Objective     Physical Exam  General Appearance: Alert, in no acute distress  Head:   Normocephalic, atraumatic  Eyes:    PERRLA, EOM intact, conjunctivae and sclerae normal, no  icterus  Throat: Oral mucosa moist  Neck:No carotid bruit or JVD  Lungs:  Clear to auscultation, respirations regular, even and unlabored   Heart:  Regular rhythm and normal rate, normal S1 and S2, no   murmur, no gallop, no rub, no click  Chest Wall:  No abnormalities observed  Abdomen:  Soft,  non-tender, non-distended, no guarding, no rebound  tenderness  Extremities:No edema, no cyanosis or redness  Pulses:Pulses palpable and equal bilaterally in all extremities  Skin:  No bleeding, bruising or rash   Neurologic:  Normal mood, thought content and behavior         No results found for: \"PROBNP\"  CMP          3/2/2025    04:34 3/11/2025    08:50 3/14/2025    02:48   CMP   Glucose 119  92  96    BUN 7  7  8    Creatinine 0.96  1.06  0.95    EGFR 84.5  75.0  85.6    Sodium 134  134  132    Potassium 4.5  4.5  4.6    Chloride 101  94  98    Calcium 8.8  9.4  8.9    BUN/Creatinine Ratio 7.3  6.6  8.4    Anion Gap 9.1  12.1  8.6      CBC w/diff          3/2/2025    04:34 3/11/2025    08:50 3/14/2025    02:48   CBC w/Diff   WBC 11.96  7.17  9.49    RBC 4.72  4.70  4.48    Hemoglobin 13.8  14.0  13.1    Hematocrit 40.9  41.2  38.8    MCV 86.7  87.7  86.6    MCH 29.2  29.8  29.2    MCHC 33.7  34.0  33.8    RDW 11.9  12.0  11.9    Platelets 213  300  280    Neutrophil Rel %  57.7     Immature Granulocyte Rel %  0.4     Lymphocyte Rel %  26.2     Monocyte Rel %  12.8     Eosinophil Rel %  2.2     Basophil Rel %  " "0.7        No results found for: \"TSH\"   No results found for: \"FREET4\"   No results found for: \"DDIMERQUANT\"  Magnesium   Date Value Ref Range Status   03/01/2025 1.9 1.6 - 2.4 mg/dL Final      No results found for: \"DIGOXIN\"   Lab Results   Component Value Date    TROPONINT 1,072 (C) 03/02/2025           Lipid Panel          3/2/2025    04:34 3/14/2025    02:48   Lipid Panel   Total Cholesterol 132  105    Triglycerides 81  88    HDL Cholesterol 43  41    VLDL Cholesterol 16  17    LDL Cholesterol  73  47    LDL/HDL Ratio 1.69  1.13      No results found for: \"POCTROP\"  Results for orders placed during the hospital encounter of 05/14/18    Stress Test With Myocardial Perfusion One Day    Interpretation Summary  · Myocardial perfusion imaging indicates a normal myocardial perfusion study with no evidence of ischemia.  · Left ventricular ejection fraction is normal (Calculated EF = 59%).  · Impressions are consistent with a low risk study.  · There is no prior study available for comparison.    Results for orders placed during the hospital encounter of 03/01/25    Adult Transthoracic Echo Complete W/ Cont if Necessary Per Protocol    Interpretation Summary    Left ventricular systolic function is normal. Calculated left ventricular 3D EF = 55% Left ventricular ejection fraction appears to be 56 - 60%.    Left ventricular diastolic function was normal.    Moderate aortic valve regurgitation is present.    Estimated right ventricular systolic pressure from tricuspid regurgitation is mildly elevated (35-45 mmHg).     Results for orders placed during the hospital encounter of 03/13/25    Cardiac Catheterization/Vascular Study    Conclusion  Percutaneous coronary intervention report    DATE OF PROCEDURE: 3/13/2025    INDICATION FOR PROCEDURE:    Angina pectoris  CAD    PROCEDURE PERFORMED:    Balloon angioplasty and stenting of left main extending into LCx coronary artery      PROCEDURE COMMENTS:    After diagnostic " catheterization, we proceeded to the intervention of the left main/LCx coronary artery.    We advanced a 6 German XB 3 5 LAD interventional guide and selective engagement of the left coronary artery was achieved.  We advanced interventional guidewire 0.014 whisper and crossed the lesion and placed in the distal part of the LCx coronary artery.    We advanced 2.5 x 15 compliant balloon and the pre-dilatation at nominal pressure.  After the predilatation, this balloon was removed and we advanced 3.5 x 12 lithotripsy balloon and we did shock therapy multiple times.  We advanced 4.0 x 18 drug-eluting stent and advanced and placed over the lesion and deployed at nominal pressure.  Stenosis decreased from 95 to 99% to less than 10%.  No dissection, perforation or no reflow phenomena was noted.    Patient was given aspirin, Plavix and heparin during the procedure.  ACT at the end of procedure was 266    Patient tolerated the procedure well.      SUMMARY:    1.  Three-vessel coronary artery disease  2.  High-grade stenosis of distal left main into circumflex artery  3.  Successful stenting of left main into circumflex artery    RECOMMENDATIONS:    STEWART Shannon  reports that he has never smoked. He has never used smokeless tobacco.

## 2025-04-14 ENCOUNTER — APPOINTMENT (OUTPATIENT)
Dept: CARDIAC REHAB | Facility: HOSPITAL | Age: 70
End: 2025-04-14
Payer: MEDICARE

## 2025-04-14 ENCOUNTER — TREATMENT (OUTPATIENT)
Dept: CARDIAC REHAB | Facility: HOSPITAL | Age: 70
End: 2025-04-14
Payer: MEDICARE

## 2025-04-14 DIAGNOSIS — Z95.5 S/P DRUG ELUTING CORONARY STENT PLACEMENT: Primary | ICD-10-CM

## 2025-04-14 PROCEDURE — 93798 PHYS/QHP OP CAR RHAB W/ECG: CPT

## 2025-04-15 ENCOUNTER — OFFICE VISIT (OUTPATIENT)
Dept: CARDIOLOGY | Facility: CLINIC | Age: 70
End: 2025-04-15
Payer: MEDICARE

## 2025-04-15 VITALS
HEART RATE: 66 BPM | WEIGHT: 254 LBS | BODY MASS INDEX: 35.56 KG/M2 | RESPIRATION RATE: 16 BRPM | OXYGEN SATURATION: 95 % | DIASTOLIC BLOOD PRESSURE: 76 MMHG | HEIGHT: 71 IN | SYSTOLIC BLOOD PRESSURE: 154 MMHG

## 2025-04-15 DIAGNOSIS — I10 HYPERTENSION, UNSPECIFIED TYPE: Chronic | ICD-10-CM

## 2025-04-15 DIAGNOSIS — G47.30 SLEEP APNEA, UNSPECIFIED TYPE: ICD-10-CM

## 2025-04-15 DIAGNOSIS — I25.10 CORONARY ARTERY DISEASE INVOLVING NATIVE CORONARY ARTERY OF NATIVE HEART WITHOUT ANGINA PECTORIS: Primary | ICD-10-CM

## 2025-04-16 ENCOUNTER — TREATMENT (OUTPATIENT)
Dept: CARDIAC REHAB | Facility: HOSPITAL | Age: 70
End: 2025-04-16
Payer: MEDICARE

## 2025-04-16 DIAGNOSIS — Z95.5 S/P DRUG ELUTING CORONARY STENT PLACEMENT: Primary | ICD-10-CM

## 2025-04-16 DIAGNOSIS — I21.29 ST ELEVATION MYOCARDIAL INFARCTION (STEMI) INVOLVING OTHER CORONARY ARTERY: ICD-10-CM

## 2025-04-16 PROCEDURE — 93798 PHYS/QHP OP CAR RHAB W/ECG: CPT

## 2025-04-18 ENCOUNTER — TREATMENT (OUTPATIENT)
Dept: CARDIAC REHAB | Facility: HOSPITAL | Age: 70
End: 2025-04-18
Payer: MEDICARE

## 2025-04-18 DIAGNOSIS — Z95.5 S/P DRUG ELUTING CORONARY STENT PLACEMENT: Primary | ICD-10-CM

## 2025-04-18 PROCEDURE — 93798 PHYS/QHP OP CAR RHAB W/ECG: CPT

## 2025-04-21 ENCOUNTER — APPOINTMENT (OUTPATIENT)
Dept: CARDIAC REHAB | Facility: HOSPITAL | Age: 70
End: 2025-04-21
Payer: MEDICARE

## 2025-04-23 ENCOUNTER — TREATMENT (OUTPATIENT)
Dept: CARDIAC REHAB | Facility: HOSPITAL | Age: 70
End: 2025-04-23
Payer: MEDICARE

## 2025-04-23 DIAGNOSIS — Z95.5 S/P DRUG ELUTING CORONARY STENT PLACEMENT: Primary | ICD-10-CM

## 2025-04-23 PROCEDURE — 93798 PHYS/QHP OP CAR RHAB W/ECG: CPT

## 2025-04-25 ENCOUNTER — APPOINTMENT (OUTPATIENT)
Dept: CARDIAC REHAB | Facility: HOSPITAL | Age: 70
End: 2025-04-25
Payer: MEDICARE

## 2025-04-28 ENCOUNTER — APPOINTMENT (OUTPATIENT)
Dept: CARDIAC REHAB | Facility: HOSPITAL | Age: 70
End: 2025-04-28
Payer: MEDICARE

## 2025-04-30 ENCOUNTER — APPOINTMENT (OUTPATIENT)
Dept: CARDIAC REHAB | Facility: HOSPITAL | Age: 70
End: 2025-04-30
Payer: MEDICARE

## 2025-08-21 ENCOUNTER — OFFICE VISIT (OUTPATIENT)
Dept: CARDIOLOGY | Facility: CLINIC | Age: 70
End: 2025-08-21
Payer: MEDICARE

## 2025-08-21 VITALS
HEART RATE: 59 BPM | OXYGEN SATURATION: 95 % | RESPIRATION RATE: 18 BRPM | BODY MASS INDEX: 36.54 KG/M2 | WEIGHT: 261 LBS | DIASTOLIC BLOOD PRESSURE: 71 MMHG | HEIGHT: 71 IN | SYSTOLIC BLOOD PRESSURE: 137 MMHG

## 2025-08-21 DIAGNOSIS — I10 HYPERTENSION, UNSPECIFIED TYPE: Chronic | ICD-10-CM

## 2025-08-21 DIAGNOSIS — G47.30 SLEEP APNEA, UNSPECIFIED TYPE: ICD-10-CM

## 2025-08-21 DIAGNOSIS — E78.00 HYPERCHOLESTEREMIA: Chronic | ICD-10-CM

## 2025-08-21 DIAGNOSIS — I25.10 ASCVD (ARTERIOSCLEROTIC CARDIOVASCULAR DISEASE): Primary | Chronic | ICD-10-CM

## 2025-08-21 RX ORDER — PANTOPRAZOLE SODIUM 20 MG/1
20 TABLET, DELAYED RELEASE ORAL DAILY
COMMUNITY
Start: 2025-08-14

## 2025-08-21 RX ORDER — PROMETHAZINE HYDROCHLORIDE 12.5 MG/1
12.5 TABLET ORAL EVERY 6 HOURS PRN
COMMUNITY
Start: 2025-07-02

## (undated) DEVICE — CATH DIAG IMPULSE IMT 6F 100CM

## (undated) DEVICE — DEV INFL COMPAK W/ACCESSPLUS IN4530

## (undated) DEVICE — NC TREK NEO™ CORONARY DILATATION CATHETER 4.00 MM X 12 MM / RAPID-EXCHANGE: Brand: NC TREK NEO™

## (undated) DEVICE — PK TRY HEART CATH 50

## (undated) DEVICE — ELECTRD DEFIB M/FUNC PROPADZ RADIOL 2PK

## (undated) DEVICE — CONTRST ISOVUE300 61PCT 50ML

## (undated) DEVICE — MEDICINE CUP, GRADUATED, STER: Brand: MEDLINE

## (undated) DEVICE — CATH DIAG IMPULSE PIG .056 6F 110CM

## (undated) DEVICE — BALN NC/EUPHORA RX 2.00X15MM

## (undated) DEVICE — BOWL PLSTC MD 16OZ BLU STRL

## (undated) DEVICE — PINNACLE INTRODUCER SHEATH: Brand: PINNACLE

## (undated) DEVICE — CATH DIAG IMPULSE FR4 6F 100CM

## (undated) DEVICE — NDL PERC 1PRT THNWALL W/BASEPLT 18G 7CM

## (undated) DEVICE — TREK CORONARY DILATATION CATHETER 2.50 MM X 15 MM / RAPID-EXCHANGE: Brand: TREK

## (undated) DEVICE — GUIDE CATHETER: Brand: MACH1™

## (undated) DEVICE — CATH BALN INTRAVASC/LITHO C2PLUS 3.5X12MM

## (undated) DEVICE — TBG NAMIC PRESS MONTR A/ F/M 12IN

## (undated) DEVICE — HI-TORQUE WHISPER MS GUIDE WIRE .014 STRAIGHT TIP 3.0 CM X 190 CM: Brand: HI-TORQUE WHISPER

## (undated) DEVICE — DGW .035 FC J3MM 150CM TEF HEP: Brand: EMERALD

## (undated) DEVICE — CVR PROB ULTRASND CIVFLEX GEN/PURP TELESCP/FOLD 5.5X96IN LF

## (undated) DEVICE — CATH DIAG IMPULSE FL4 6F 100CM

## (undated) DEVICE — HI-TORQUE BALANCE MIDDLEWEIGHT UNIVERSAL II GUIDE WIRE J TIP PAK 190 CM: Brand: HI-TORQUE BALANCE MIDDLEWEIGHT UNIVERSAL II

## (undated) DEVICE — SYR LL TP 10ML STRL

## (undated) DEVICE — VBT GC 6F .070 XB LAD 3.5 100CM: Brand: VISTA BRITE TIP

## (undated) DEVICE — ST ACC MICROPUNCTURE STFF/CANN PLAT/TP 4F 21G 40CM